# Patient Record
Sex: FEMALE | Race: WHITE | NOT HISPANIC OR LATINO | Employment: FULL TIME | URBAN - NONMETROPOLITAN AREA
[De-identification: names, ages, dates, MRNs, and addresses within clinical notes are randomized per-mention and may not be internally consistent; named-entity substitution may affect disease eponyms.]

---

## 2023-02-02 PROBLEM — D12.6 TUBULAR ADENOMA OF COLON: Status: ACTIVE | Noted: 2023-02-02

## 2023-02-02 PROBLEM — K63.5 COLON POLYPS: Status: ACTIVE | Noted: 2023-02-02

## 2023-02-02 PROBLEM — R09.82 POSTNASAL DRIP: Status: ACTIVE | Noted: 2023-02-02

## 2023-02-02 PROBLEM — G21.19: Status: ACTIVE | Noted: 2023-02-02

## 2023-02-02 PROBLEM — M85.80 OSTEOPENIA: Status: ACTIVE | Noted: 2023-02-02

## 2023-02-02 PROBLEM — D64.9 LOW HEMOGLOBIN: Status: ACTIVE | Noted: 2023-02-02

## 2023-02-02 PROBLEM — M79.676 PAIN AROUND TOENAIL: Status: ACTIVE | Noted: 2023-02-02

## 2023-02-02 PROBLEM — J02.9 SORE THROAT: Status: ACTIVE | Noted: 2023-02-02

## 2023-02-02 PROBLEM — M79.606 PAIN IN LOWER LIMB: Status: ACTIVE | Noted: 2023-02-02

## 2023-02-02 PROBLEM — M79.644 PAIN OF RIGHT THUMB: Status: ACTIVE | Noted: 2023-02-02

## 2023-02-02 PROBLEM — U07.1 COVID-19: Status: ACTIVE | Noted: 2023-02-02

## 2023-02-02 PROBLEM — I83.90 VARICOSE VEINS OF LOWER EXTREMITY: Status: ACTIVE | Noted: 2023-02-02

## 2023-02-02 PROBLEM — Z98.84 BARIATRIC SURGERY STATUS: Status: ACTIVE | Noted: 2023-02-02

## 2023-02-02 PROBLEM — F32.A DEPRESSION: Status: ACTIVE | Noted: 2023-02-02

## 2023-02-02 PROBLEM — S86.912A STRAIN OF LEFT KNEE: Status: ACTIVE | Noted: 2023-02-02

## 2023-02-02 PROBLEM — N39.0 UTI (URINARY TRACT INFECTION): Status: ACTIVE | Noted: 2023-02-02

## 2023-02-02 PROBLEM — R20.0 NUMBNESS AND TINGLING IN BOTH HANDS: Status: ACTIVE | Noted: 2023-02-02

## 2023-02-02 PROBLEM — J34.89 NASAL CRUSTING: Status: ACTIVE | Noted: 2023-02-02

## 2023-02-02 PROBLEM — R06.02 SOB (SHORTNESS OF BREATH): Status: ACTIVE | Noted: 2023-02-02

## 2023-02-02 PROBLEM — R26.9 GAIT DISTURBANCE: Status: ACTIVE | Noted: 2023-02-02

## 2023-02-02 PROBLEM — D50.9 IDA (IRON DEFICIENCY ANEMIA): Status: ACTIVE | Noted: 2023-02-02

## 2023-02-02 PROBLEM — G47.9 SLEEP DISTURBANCE: Status: ACTIVE | Noted: 2023-02-02

## 2023-02-02 PROBLEM — R35.0 INCREASED FREQUENCY OF URINATION: Status: ACTIVE | Noted: 2023-02-02

## 2023-02-02 PROBLEM — E66.9 CLASS 2 OBESITY WITH BODY MASS INDEX (BMI) OF 35.0 TO 35.9 IN ADULT: Status: ACTIVE | Noted: 2023-02-02

## 2023-02-02 PROBLEM — E78.00 HYPERCHOLESTEREMIA: Status: ACTIVE | Noted: 2023-02-02

## 2023-02-02 PROBLEM — E66.01 MORBID OBESITY (MULTI): Status: ACTIVE | Noted: 2023-02-02

## 2023-02-02 PROBLEM — M79.676 PAIN OF TOE: Status: ACTIVE | Noted: 2023-02-02

## 2023-02-02 PROBLEM — B35.1 ONYCHOMYCOSIS OF TOENAIL: Status: ACTIVE | Noted: 2023-02-02

## 2023-02-02 PROBLEM — R10.13 DYSPEPSIA: Status: ACTIVE | Noted: 2023-02-02

## 2023-02-02 PROBLEM — R05.9 COUGH: Status: ACTIVE | Noted: 2023-02-02

## 2023-02-02 PROBLEM — K25.0 ACUTE GASTRIC ULCER WITH HEMORRHAGE: Status: ACTIVE | Noted: 2023-02-02

## 2023-02-02 PROBLEM — S62.524A CLOSED NONDISPLACED FRACTURE OF DISTAL PHALANX OF RIGHT THUMB: Status: ACTIVE | Noted: 2023-02-02

## 2023-02-02 PROBLEM — J45.909 ASTHMATIC BRONCHITIS (HHS-HCC): Status: ACTIVE | Noted: 2023-02-02

## 2023-02-02 PROBLEM — R20.2 NUMBNESS AND TINGLING IN BOTH HANDS: Status: ACTIVE | Noted: 2023-02-02

## 2023-02-02 PROBLEM — R91.8 LUNG NODULES: Status: ACTIVE | Noted: 2023-02-02

## 2023-02-02 PROBLEM — G47.33 OSA (OBSTRUCTIVE SLEEP APNEA): Status: ACTIVE | Noted: 2023-02-02

## 2023-02-02 PROBLEM — L60.1 ONYCHOLYSIS OF TOENAIL: Status: ACTIVE | Noted: 2023-02-02

## 2023-02-02 PROBLEM — E55.9 VITAMIN D DEFICIENCY: Status: ACTIVE | Noted: 2023-02-02

## 2023-02-02 PROBLEM — S80.02XA: Status: ACTIVE | Noted: 2023-02-02

## 2023-02-02 PROBLEM — R09.89 EVIDENCE OF AIRWAYS HYPERREACTIVITY WITHOUT DIAGNOSIS OF ASTHMA: Status: ACTIVE | Noted: 2023-02-02

## 2023-02-02 PROBLEM — R11.0 NAUSEA: Status: ACTIVE | Noted: 2023-02-02

## 2023-02-02 PROBLEM — R60.0 EDEMA OF LOWER EXTREMITY: Status: ACTIVE | Noted: 2023-02-02

## 2023-02-02 PROBLEM — M54.50 LOWER BACK PAIN: Status: ACTIVE | Noted: 2023-02-02

## 2023-02-02 PROBLEM — R30.0 DYSURIA: Status: ACTIVE | Noted: 2023-02-02

## 2023-02-02 PROBLEM — M19.079 ARTHRITIS OF MIDFOOT: Status: ACTIVE | Noted: 2023-02-02
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
## 2023-02-02 PROBLEM — R51.9 HEADACHE: Status: ACTIVE | Noted: 2023-02-02

## 2023-02-02 PROBLEM — E03.9 HYPOTHYROIDISM: Status: ACTIVE | Noted: 2023-02-02

## 2023-02-02 PROBLEM — J30.9 ALLERGIC RHINITIS: Status: ACTIVE | Noted: 2023-02-02

## 2023-02-02 PROBLEM — E66.812 CLASS 2 OBESITY WITH BODY MASS INDEX (BMI) OF 35.0 TO 35.9 IN ADULT: Status: ACTIVE | Noted: 2023-02-02

## 2023-02-02 PROBLEM — S46.919A SHOULDER STRAIN: Status: ACTIVE | Noted: 2023-02-02

## 2023-02-02 PROBLEM — M79.18 MUSCULOSKELETAL PAIN: Status: ACTIVE | Noted: 2023-02-02

## 2023-02-02 PROBLEM — R06.83 SNORING: Status: ACTIVE | Noted: 2023-02-02

## 2023-02-02 PROBLEM — J34.2 DEVIATED NASAL SEPTUM: Status: ACTIVE | Noted: 2023-02-02

## 2023-02-02 PROBLEM — S46.811A STRAIN OF RIGHT SUBSCAPULARIS MUSCLE: Status: ACTIVE | Noted: 2023-02-02

## 2023-02-02 PROBLEM — K92.2 GASTRIC HEMORRHAGE: Status: ACTIVE | Noted: 2023-02-02

## 2023-02-02 PROBLEM — S90.229A SUBUNGUAL HEMATOMA OF TOE: Status: ACTIVE | Noted: 2023-02-02

## 2023-02-02 PROBLEM — R42 VERTIGO: Status: ACTIVE | Noted: 2023-02-02

## 2023-02-02 PROBLEM — R79.0 LOW FERRITIN LEVEL: Status: ACTIVE | Noted: 2023-02-02

## 2023-02-02 PROBLEM — Z86.59 HISTORY OF DEPRESSION: Status: ACTIVE | Noted: 2023-02-02

## 2023-02-02 PROBLEM — D64.9 ANEMIA OF UNKNOWN ETIOLOGY: Status: ACTIVE | Noted: 2023-02-02

## 2023-02-02 PROBLEM — R20.0 HAND NUMBNESS: Status: ACTIVE | Noted: 2023-02-02

## 2023-02-02 PROBLEM — R07.89 RIGHT-SIDED CHEST WALL PAIN: Status: ACTIVE | Noted: 2023-02-02

## 2023-02-02 RX ORDER — ALENDRONATE SODIUM 70 MG/1
1 TABLET ORAL
COMMUNITY
Start: 2014-09-04 | End: 2023-12-22 | Stop reason: ALTCHOICE

## 2023-02-02 RX ORDER — CETIRIZINE HYDROCHLORIDE 10 MG/1
1 TABLET ORAL DAILY
COMMUNITY
Start: 2020-05-11 | End: 2024-03-05 | Stop reason: SDUPTHER

## 2023-02-02 RX ORDER — MONTELUKAST SODIUM 10 MG/1
1 TABLET ORAL NIGHTLY
COMMUNITY
Start: 2020-05-11 | End: 2023-11-22 | Stop reason: SDUPTHER

## 2023-02-02 RX ORDER — LEVOTHYROXINE SODIUM 50 UG/1
1 TABLET ORAL DAILY
COMMUNITY
Start: 2014-02-14 | End: 2023-05-03

## 2023-02-02 RX ORDER — IBUPROFEN 100 MG/5ML
1 SUSPENSION, ORAL (FINAL DOSE FORM) ORAL DAILY
COMMUNITY
Start: 2020-11-12

## 2023-02-02 RX ORDER — TOPIRAMATE 50 MG/1
TABLET, FILM COATED ORAL
COMMUNITY
Start: 2021-05-03 | End: 2023-12-22 | Stop reason: ALTCHOICE

## 2023-02-02 RX ORDER — FLUTICASONE FUROATE AND VILANTEROL 200; 25 UG/1; UG/1
1 POWDER RESPIRATORY (INHALATION) DAILY
COMMUNITY
Start: 2020-04-07 | End: 2023-06-26 | Stop reason: SDUPTHER

## 2023-02-02 RX ORDER — ALBUTEROL SULFATE 90 UG/1
AEROSOL, METERED RESPIRATORY (INHALATION)
COMMUNITY
Start: 2020-04-07

## 2023-02-02 RX ORDER — FLUTICASONE PROPIONATE 50 MCG
2 SPRAY, SUSPENSION (ML) NASAL DAILY
COMMUNITY
Start: 2020-05-11 | End: 2024-03-05 | Stop reason: SDUPTHER

## 2023-03-04 LAB
FLU A RESULT: NOT DETECTED
FLU B RESULT: NOT DETECTED
SARS-COV-2 RESULT: DETECTED

## 2023-03-08 NOTE — PROGRESS NOTES
Subjective   Patient ID:   Justine Mesa is a 72 y.o. female who presents for Follow-up, Hypothyroidism, and Asthma.  HPI  Hiatal hernia/bariatric surgery:  Was cleared by me in Nov 2020 for surgery.  She is no longer pursuing this.     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  No longer an issue.     Iron deficiency anemia - secondary to GI bleed:  Seeing hematology.  Getting iron infusions and ferrous sulfate.  Hemoglobin was very low in June 2021.  Improved in Dec 2021 and Jan 2023.     Gastric ulcer:  Secondary to NSAIDs.  Taking Protonix and Carafate.  Had EGD and colonoscopy in Sep 2020.     Hypothyroidism:  Taking Synthroid.  Normal in July 2020.  DUE for re-check.     YOBANY:  Seeing pulmonology.     HLD:  Taking Simvastatin.  Last checked Sep 2020.  DUE for re-check.     Health maintenance:  Smoking: Never a smoker.  Mammogram (40-75): Jan 2022.  Labs: Jan 2023. DUE for TSH, lipid  Colonoscopy (50-75): Sep 2020  DEXA (65+, q 2 years): Nov 2019  Prevnar 13 (65+): 2017  Pneumovax 23 (65+, 1 year after Prev 13): Jan 2019  Influenza:  Zostavax (60+, 1 time, at pharmacy): DUE    Review of Systems  12 point review of systems negative unless stated above in HPI    Vitals:    03/16/23 0828   BP: 132/80   Pulse: 66   Resp: 18   SpO2: 94%       Physical Exam  General: Alert and oriented, well nourished, no acute distress.  Lungs: Clear to auscultation, non-labored respiration.  Heart: Normal rate, regular rhythm, no murmur, gallop or edema.  Neurologic: Awake, alert, and oriented X3, CN II-XII intact.  Psychiatric: Cooperative, appropriate mood and affect.    Assessment/Plan   I am glad you are well!  I have ordered some labs to be done as soon as you can.  We will call you with the results.  I have ordered you a mammogram to be done as soon as you are able.  We will call the results.  Continue specialty care.  Continue the same medications.  Chronic conditions are stable.  Call with questions or  concerns.    F/u  4 months  Diagnoses and all orders for this visit:  Drug-induced Parkinson's disease (CMS/HCC)  Low ferritin level  Iron deficiency anemia, unspecified iron deficiency anemia type  Hypothyroidism, unspecified type  -     TSH with reflex to Free T4 if abnormal; Future  Hypercholesteremia  -     Lipid Panel; Future  Breast screening  -     BI mammo bilateral screening tomosynthesis; Future

## 2023-03-16 ENCOUNTER — OFFICE VISIT (OUTPATIENT)
Dept: PRIMARY CARE | Facility: CLINIC | Age: 73
End: 2023-03-16
Payer: MEDICARE

## 2023-03-16 VITALS
OXYGEN SATURATION: 94 % | BODY MASS INDEX: 34.76 KG/M2 | SYSTOLIC BLOOD PRESSURE: 132 MMHG | WEIGHT: 203.6 LBS | HEART RATE: 66 BPM | DIASTOLIC BLOOD PRESSURE: 80 MMHG | RESPIRATION RATE: 18 BRPM | HEIGHT: 64 IN

## 2023-03-16 DIAGNOSIS — Z12.39 BREAST SCREENING: ICD-10-CM

## 2023-03-16 DIAGNOSIS — E03.9 HYPOTHYROIDISM, UNSPECIFIED TYPE: ICD-10-CM

## 2023-03-16 DIAGNOSIS — E78.00 HYPERCHOLESTEREMIA: ICD-10-CM

## 2023-03-16 DIAGNOSIS — R79.0 LOW FERRITIN LEVEL: ICD-10-CM

## 2023-03-16 DIAGNOSIS — G21.19 DRUG-INDUCED PARKINSON'S DISEASE (MULTI): Primary | ICD-10-CM

## 2023-03-16 DIAGNOSIS — D50.9 IRON DEFICIENCY ANEMIA, UNSPECIFIED IRON DEFICIENCY ANEMIA TYPE: ICD-10-CM

## 2023-03-16 PROBLEM — K57.32 DIVERTICULITIS OF COLON WITHOUT HEMORRHAGE: Status: ACTIVE | Noted: 2023-02-02

## 2023-03-16 PROCEDURE — 1036F TOBACCO NON-USER: CPT | Performed by: PHYSICIAN ASSISTANT

## 2023-03-16 PROCEDURE — 99213 OFFICE O/P EST LOW 20 MIN: CPT | Performed by: PHYSICIAN ASSISTANT

## 2023-03-16 PROCEDURE — 1160F RVW MEDS BY RX/DR IN RCRD: CPT | Performed by: PHYSICIAN ASSISTANT

## 2023-03-16 PROCEDURE — 1159F MED LIST DOCD IN RCRD: CPT | Performed by: PHYSICIAN ASSISTANT

## 2023-03-16 RX ORDER — BUTALB/ACETAMINOPHEN/CAFFEINE 50-325-40
1 TABLET ORAL DAILY
COMMUNITY

## 2023-03-16 RX ORDER — TITANIUM DIOXIDE, OCTINOXATE, ZINC OXIDE 4.61; 1.6; .78 G/40ML; G/40ML; G/40ML
500 CREAM TOPICAL
COMMUNITY

## 2023-03-16 RX ORDER — GUAIFENESIN 600 MG/1
1200 TABLET, EXTENDED RELEASE ORAL DAILY
COMMUNITY

## 2023-03-16 RX ORDER — OXYBUTYNIN CHLORIDE 10 MG/1
10 TABLET, EXTENDED RELEASE ORAL DAILY
COMMUNITY

## 2023-03-16 ASSESSMENT — PATIENT HEALTH QUESTIONNAIRE - PHQ9
2. FEELING DOWN, DEPRESSED OR HOPELESS: NOT AT ALL
SUM OF ALL RESPONSES TO PHQ9 QUESTIONS 1 AND 2: 0
1. LITTLE INTEREST OR PLEASURE IN DOING THINGS: NOT AT ALL

## 2023-03-16 ASSESSMENT — PAIN SCALES - GENERAL: PAINLEVEL: 0-NO PAIN

## 2023-05-03 DIAGNOSIS — E03.9 HYPOTHYROIDISM, UNSPECIFIED TYPE: ICD-10-CM

## 2023-05-03 RX ORDER — LEVOTHYROXINE SODIUM 50 UG/1
TABLET ORAL
Qty: 90 TABLET | Refills: 0 | Status: SHIPPED | OUTPATIENT
Start: 2023-05-03 | End: 2023-08-14 | Stop reason: SDUPTHER

## 2023-05-04 ENCOUNTER — LAB (OUTPATIENT)
Dept: LAB | Facility: LAB | Age: 73
End: 2023-05-04
Payer: MEDICARE

## 2023-05-04 DIAGNOSIS — E03.9 HYPOTHYROIDISM, UNSPECIFIED TYPE: ICD-10-CM

## 2023-05-04 DIAGNOSIS — E78.00 HYPERCHOLESTEREMIA: ICD-10-CM

## 2023-05-04 DIAGNOSIS — D50.9 IRON DEFICIENCY ANEMIA, UNSPECIFIED IRON DEFICIENCY ANEMIA TYPE: ICD-10-CM

## 2023-05-04 LAB
BASOPHILS (10*3/UL) IN BLOOD BY AUTOMATED COUNT: 0.45 X10E9/L (ref 0–0.1)
BASOPHILS/100 LEUKOCYTES IN BLOOD BY AUTOMATED COUNT: 4.9 % (ref 0–2)
CHOLESTEROL (MG/DL) IN SER/PLAS: 232 MG/DL (ref 0–199)
CHOLESTEROL IN HDL (MG/DL) IN SER/PLAS: 53.2 MG/DL
CHOLESTEROL/HDL RATIO: 4.4
EOSINOPHILS (10*3/UL) IN BLOOD BY AUTOMATED COUNT: 0.21 X10E9/L (ref 0–0.4)
EOSINOPHILS/100 LEUKOCYTES IN BLOOD BY AUTOMATED COUNT: 2.3 % (ref 0–6)
ERYTHROCYTE DISTRIBUTION WIDTH (RATIO) BY AUTOMATED COUNT: 14.8 % (ref 11.5–14.5)
ERYTHROCYTE MEAN CORPUSCULAR HEMOGLOBIN CONCENTRATION (G/DL) BY AUTOMATED: 31.6 G/DL (ref 32–36)
ERYTHROCYTE MEAN CORPUSCULAR VOLUME (FL) BY AUTOMATED COUNT: 97 FL (ref 80–100)
ERYTHROCYTES (10*6/UL) IN BLOOD BY AUTOMATED COUNT: 4.75 X10E12/L (ref 4–5.2)
FERRITIN (UG/LL) IN SER/PLAS: 333 UG/L (ref 8–150)
HEMATOCRIT (%) IN BLOOD BY AUTOMATED COUNT: 46.2 % (ref 36–46)
HEMOGLOBIN (G/DL) IN BLOOD: 14.6 G/DL (ref 12–16)
IMMATURE GRANULOCYTES/100 LEUKOCYTES IN BLOOD BY AUTOMATED COUNT: 7.9 % (ref 0–0.9)
IRON (UG/DL) IN SER/PLAS: 85 UG/DL (ref 35–150)
IRON BINDING CAPACITY (UG/DL) IN SER/PLAS: 286 UG/DL (ref 240–445)
IRON SATURATION (%) IN SER/PLAS: 30 % (ref 25–45)
LDL: 145 MG/DL (ref 0–99)
LEUKOCYTES (10*3/UL) IN BLOOD BY AUTOMATED COUNT: 9.1 X10E9/L (ref 4.4–11.3)
LYMPHOCYTES (10*3/UL) IN BLOOD BY AUTOMATED COUNT: 1.66 X10E9/L (ref 0.8–3)
LYMPHOCYTES/100 LEUKOCYTES IN BLOOD BY AUTOMATED COUNT: 18.2 % (ref 13–44)
MONOCYTES (10*3/UL) IN BLOOD BY AUTOMATED COUNT: 0.95 X10E9/L (ref 0.05–0.8)
MONOCYTES/100 LEUKOCYTES IN BLOOD BY AUTOMATED COUNT: 10.4 % (ref 2–10)
NEUTROPHILS (10*3/UL) IN BLOOD BY AUTOMATED COUNT: 5.13 X10E9/L (ref 1.6–5.5)
NEUTROPHILS/100 LEUKOCYTES IN BLOOD BY AUTOMATED COUNT: 56.3 % (ref 40–80)
OVALOCYTES PRESENCE IN BLOOD BY LIGHT MICROSCOPY: NORMAL
PLATELETS (10*3/UL) IN BLOOD AUTOMATED COUNT: 209 X10E9/L (ref 150–450)
PLATELETS GIANT PRESENCE IN BLOOD BY LIGHT MICROSCOPY: NORMAL
RBC MORPHOLOGY IN BLOOD: NORMAL
REACTIVE LYMPH: NORMAL
SCHISTOCYTES (PRESENCE) IN BLOOD BY LIGHT MICROSCOPY: NORMAL
THYROTROPIN (MIU/L) IN SER/PLAS BY DETECTION LIMIT <= 0.05 MIU/L: 1.22 MIU/L (ref 0.44–3.98)
TRIGLYCERIDE (MG/DL) IN SER/PLAS: 169 MG/DL (ref 0–149)
VLDL: 34 MG/DL (ref 0–40)

## 2023-05-04 PROCEDURE — 84443 ASSAY THYROID STIM HORMONE: CPT

## 2023-05-04 PROCEDURE — 85025 COMPLETE CBC W/AUTO DIFF WBC: CPT

## 2023-05-04 PROCEDURE — 83540 ASSAY OF IRON: CPT

## 2023-05-04 PROCEDURE — 83550 IRON BINDING TEST: CPT

## 2023-05-04 PROCEDURE — 36415 COLL VENOUS BLD VENIPUNCTURE: CPT

## 2023-05-04 PROCEDURE — 80061 LIPID PANEL: CPT

## 2023-05-04 PROCEDURE — 82728 ASSAY OF FERRITIN: CPT

## 2023-05-05 NOTE — RESULT ENCOUNTER NOTE
Labs look good overall - ferritin is high - she does see hematology. Cholesterol is much higher than last time - diet and exercise.

## 2023-05-25 LAB
APPEARANCE, URINE: NORMAL
BILIRUBIN, URINE: NEGATIVE
BLOOD, URINE: NEGATIVE
COLOR, URINE: YELLOW
GLUCOSE, URINE: NEGATIVE MG/DL
KETONES, URINE: NEGATIVE MG/DL
LEUKOCYTE ESTERASE, URINE: NEGATIVE
NITRITE, URINE: NEGATIVE
PH, URINE: 8 (ref 5–8)
PROTEIN, URINE: NEGATIVE MG/DL
SPECIFIC GRAVITY, URINE: 1.01 (ref 1–1.03)
UROBILINOGEN, URINE: <2 MG/DL (ref 0–1.9)

## 2023-05-26 LAB — URINE CULTURE: NORMAL

## 2023-06-26 DIAGNOSIS — J45.909 ASTHMATIC BRONCHITIS WITHOUT COMPLICATION, UNSPECIFIED ASTHMA SEVERITY, UNSPECIFIED WHETHER PERSISTENT (HHS-HCC): ICD-10-CM

## 2023-06-27 RX ORDER — FLUTICASONE FUROATE AND VILANTEROL 200; 25 UG/1; UG/1
1 POWDER RESPIRATORY (INHALATION) DAILY
Qty: 1 EACH | Refills: 0 | Status: SHIPPED | OUTPATIENT
Start: 2023-06-27 | End: 2023-07-24

## 2023-07-17 ENCOUNTER — APPOINTMENT (OUTPATIENT)
Dept: PRIMARY CARE | Facility: CLINIC | Age: 73
End: 2023-07-17
Payer: MEDICARE

## 2023-07-18 ENCOUNTER — APPOINTMENT (OUTPATIENT)
Dept: PRIMARY CARE | Facility: CLINIC | Age: 73
End: 2023-07-18
Payer: MEDICARE

## 2023-08-14 DIAGNOSIS — E03.9 HYPOTHYROIDISM, UNSPECIFIED TYPE: ICD-10-CM

## 2023-08-14 RX ORDER — LEVOTHYROXINE SODIUM 50 UG/1
50 TABLET ORAL DAILY
Qty: 90 TABLET | Refills: 0 | Status: SHIPPED | OUTPATIENT
Start: 2023-08-14 | End: 2024-02-19

## 2023-10-03 DIAGNOSIS — R05.1 ACUTE COUGH: ICD-10-CM

## 2023-10-03 RX ORDER — AZITHROMYCIN 500 MG/1
500 TABLET, FILM COATED ORAL DAILY
Qty: 5 TABLET | Refills: 0 | Status: SHIPPED | OUTPATIENT
Start: 2023-10-03 | End: 2023-10-08

## 2023-10-03 RX ORDER — DEXAMETHASONE 4 MG/1
4 TABLET ORAL
Qty: 5 TABLET | Refills: 0 | Status: SHIPPED | OUTPATIENT
Start: 2023-10-03 | End: 2023-12-22 | Stop reason: ALTCHOICE

## 2023-10-03 NOTE — TELEPHONE ENCOUNTER
Pt called c/o dry cough, wheezing, sore throat,headache x 1 week   Discussed with Dr. Lopez   Start dexamethasone 4mg x 5 days   Zithromax 500mg x 5 days  Mucinex otc as needed  Let us know if no improvement

## 2023-10-04 ENCOUNTER — HOSPITAL ENCOUNTER (OUTPATIENT)
Dept: RADIOLOGY | Facility: HOSPITAL | Age: 73
Discharge: HOME | End: 2023-10-04
Payer: MEDICARE

## 2023-10-04 DIAGNOSIS — J84.9 INTERSTITIAL PULMONARY DISEASE, UNSPECIFIED (MULTI): ICD-10-CM

## 2023-10-04 PROCEDURE — 71250 CT THORAX DX C-: CPT

## 2023-10-04 PROCEDURE — 71250 CT THORAX DX C-: CPT | Performed by: STUDENT IN AN ORGANIZED HEALTH CARE EDUCATION/TRAINING PROGRAM

## 2023-11-01 ENCOUNTER — OFFICE VISIT (OUTPATIENT)
Dept: PODIATRY | Facility: CLINIC | Age: 73
End: 2023-11-01
Payer: MEDICARE

## 2023-11-01 DIAGNOSIS — R60.0 EDEMA OF LOWER EXTREMITY: ICD-10-CM

## 2023-11-01 DIAGNOSIS — M79.671 PAIN IN BOTH FEET: ICD-10-CM

## 2023-11-01 DIAGNOSIS — B35.1 ONYCHOMYCOSIS OF TOENAIL: ICD-10-CM

## 2023-11-01 DIAGNOSIS — M79.676 PAIN AROUND TOENAIL: Primary | ICD-10-CM

## 2023-11-01 DIAGNOSIS — M79.672 PAIN IN BOTH FEET: ICD-10-CM

## 2023-11-01 PROCEDURE — 99212 OFFICE O/P EST SF 10 MIN: CPT | Performed by: PODIATRIST

## 2023-11-01 PROCEDURE — 1159F MED LIST DOCD IN RCRD: CPT | Performed by: PODIATRIST

## 2023-11-01 PROCEDURE — 1160F RVW MEDS BY RX/DR IN RCRD: CPT | Performed by: PODIATRIST

## 2023-11-01 PROCEDURE — 1126F AMNT PAIN NOTED NONE PRSNT: CPT | Performed by: PODIATRIST

## 2023-11-01 PROCEDURE — 1036F TOBACCO NON-USER: CPT | Performed by: PODIATRIST

## 2023-11-09 NOTE — PROGRESS NOTES
This is a 73 y.o. female est patient for foot pain    History of Present Illness:   Patient states they are here for help trimming nails  Denies trauma  Unable to safely trim on own  Has swelling in legs  No other pedal complaints      Past Medical History  Past Medical History:   Diagnosis Date    Acute bronchitis, unspecified     Acute bronchitis, unspecified organism    Acute frontal sinusitis, unspecified 01/06/2020    Acute frontal sinusitis    Acute frontal sinusitis, unspecified 03/07/2018    Acute frontal sinusitis    Acute maxillary sinusitis, unspecified 01/06/2020    Acute maxillary sinusitis    Acute maxillary sinusitis, unspecified 11/09/2017    Acute maxillary antritis    Acute tonsillitis due to other specified organisms     Acute tonsillitis due to other specified organisms    Acute tonsillitis due to other specified organisms 01/31/2017    Acute tonsillitis due to other specified organisms    Acute upper respiratory infection, unspecified 04/18/2017    Viral URI with cough    Acute upper respiratory infection, unspecified 06/07/2017    Acute URI    Chronic frontal sinusitis 03/07/2018    Frontal sinusitis    Diaphragmatic hernia without obstruction or gangrene 06/13/2022    Paraesophageal hiatal hernia    Hypothyroidism, unspecified 01/04/2022    Hypothyroidism    Pain in unspecified lower leg 07/16/2019    Calf pain    Personal history of other diseases of the digestive system 07/11/2022    History of gastroesophageal reflux (GERD)    Personal history of other diseases of the digestive system     History of gastroesophageal reflux (GERD)    Personal history of other diseases of the digestive system     History of hemorrhoids    Personal history of other diseases of the respiratory system 03/07/2018    History of sore throat    Personal history of other diseases of the respiratory system 11/09/2017    History of acute pharyngitis    Personal history of other diseases of the respiratory system  11/21/2017    History of acute bronchitis    Personal history of other diseases of the respiratory system 03/07/2018    History of acute pharyngitis    Personal history of other specified conditions     History of dizziness    Personal history of urinary calculi     Personal history of renal calculi       Medications and Allergies have been reviewed.    Review Of Systems:  GENERAL: No weight loss, malaise or fevers.  HEENT: Negative for frequent or significant headaches,   RESPIRATORY: Negative for cough, wheezing or shortness of breath.  CARDIOVASCULAR: Negative for chest pain, leg swelling or palpitations.    Physical Exam:  Patient is a pleasant, cooperative, well developed 73 y.o.  adult female. The patient is alert and oriented to time, place and person.   Patient has normal affect and mood.    Examination of Both Lower Extremities:     Vascular exam: Dorsalis pedis and Posterior Tibial pulses palpable 2/4 bilateral. Capillary refill time less than 3 seconds b/l. No Hair growth noted to digits. Mild +1 non pitting edema noted. Skin temp warm to warm from proximal to distal b/l. No varicosities noted.     Neurologic exam: Vibratory, protective and proprioception intact b/l. No neurologic deficits noted.      Musculoskeletal exam: Muscle strength 5/5 for all major muscle groups tested in the lower extremity b/l. Contracted digits and bunion deformity noted b/l. Mid foot spurring noted b/l and decreased longitudinal arch. Pain with palpation noted to left dorsal midfoot. No edema, erythema or ecchymosis noted.      Dermatologic exam: Nails 1-5 b/l discolored and elongated. No SOI noted Webspaces 1-4 b/l are clean, dry and intact. No primary or secondary skin lesions noted. No open lesions or wounds noted at this time .  1. Pain around toenail        2. Edema of lower extremity        3. Onychomycosis of toenail        4. Pain in both feet          Patient exam and eval  Nails debrided  Monitor swelling  Recommend  compression socks  No other pedal concerns  Fu prn  Patient was in agreement to this plan. All questions answered.      Dennise Hays DPM  [phone]  [facility]  Fax: [phone]

## 2023-11-17 ENCOUNTER — LAB (OUTPATIENT)
Dept: LAB | Facility: LAB | Age: 73
End: 2023-11-17
Payer: MEDICARE

## 2023-11-17 ENCOUNTER — OFFICE VISIT (OUTPATIENT)
Dept: PULMONOLOGY | Facility: HOSPITAL | Age: 73
End: 2023-11-17
Payer: MEDICARE

## 2023-11-17 VITALS
SYSTOLIC BLOOD PRESSURE: 142 MMHG | BODY MASS INDEX: 36.39 KG/M2 | DIASTOLIC BLOOD PRESSURE: 85 MMHG | OXYGEN SATURATION: 93 % | WEIGHT: 212 LBS | HEART RATE: 64 BPM | TEMPERATURE: 97.9 F

## 2023-11-17 DIAGNOSIS — R91.1 LUNG NODULE: ICD-10-CM

## 2023-11-17 DIAGNOSIS — G47.33 OSA (OBSTRUCTIVE SLEEP APNEA): ICD-10-CM

## 2023-11-17 DIAGNOSIS — J84.9 ILD (INTERSTITIAL LUNG DISEASE) (MULTI): ICD-10-CM

## 2023-11-17 DIAGNOSIS — J84.9 ILD (INTERSTITIAL LUNG DISEASE) (MULTI): Primary | ICD-10-CM

## 2023-11-17 LAB
CCP IGG SERPL-ACNC: <1 U/ML
CENTROMERE B AB SER-ACNC: <0.2 AI
CHROMATIN AB SERPL-ACNC: <0.2 AI
CK SERPL-CCNC: 60 U/L (ref 0–215)
CRP SERPL-MCNC: 0.17 MG/DL
DSDNA AB SER-ACNC: <1 IU/ML
ENA JO1 AB SER QL IA: <0.2 AI
ENA RNP AB SER IA-ACNC: 0.2 AI
ENA SCL70 AB SER QL IA: <0.2 AI
ENA SM AB SER IA-ACNC: <0.2 AI
ENA SM+RNP AB SER QL IA: <0.2 AI
ENA SS-A AB SER IA-ACNC: <0.2 AI
ENA SS-B AB SER IA-ACNC: <0.2 AI
ERYTHROCYTE [SEDIMENTATION RATE] IN BLOOD BY WESTERGREN METHOD: 10 MM/H (ref 0–30)
RHEUMATOID FACT SER NEPH-ACNC: 10 IU/ML (ref 0–15)
RIBOSOMAL P AB SER-ACNC: <0.2 AI

## 2023-11-17 PROCEDURE — 86331 IMMUNODIFFUSION OUCHTERLONY: CPT

## 2023-11-17 PROCEDURE — 85652 RBC SED RATE AUTOMATED: CPT

## 2023-11-17 PROCEDURE — 86235 NUCLEAR ANTIGEN ANTIBODY: CPT

## 2023-11-17 PROCEDURE — 86036 ANCA SCREEN EACH ANTIBODY: CPT

## 2023-11-17 PROCEDURE — 1159F MED LIST DOCD IN RCRD: CPT | Performed by: INTERNAL MEDICINE

## 2023-11-17 PROCEDURE — 99215 OFFICE O/P EST HI 40 MIN: CPT | Performed by: INTERNAL MEDICINE

## 2023-11-17 PROCEDURE — 1036F TOBACCO NON-USER: CPT | Performed by: INTERNAL MEDICINE

## 2023-11-17 PROCEDURE — 1160F RVW MEDS BY RX/DR IN RCRD: CPT | Performed by: INTERNAL MEDICINE

## 2023-11-17 PROCEDURE — 84182 PROTEIN WESTERN BLOT TEST: CPT

## 2023-11-17 PROCEDURE — 36415 COLL VENOUS BLD VENIPUNCTURE: CPT

## 2023-11-17 PROCEDURE — 86431 RHEUMATOID FACTOR QUANT: CPT

## 2023-11-17 PROCEDURE — 86200 CCP ANTIBODY: CPT

## 2023-11-17 PROCEDURE — 86038 ANTINUCLEAR ANTIBODIES: CPT

## 2023-11-17 PROCEDURE — 83516 IMMUNOASSAY NONANTIBODY: CPT

## 2023-11-17 PROCEDURE — 82085 ASSAY OF ALDOLASE: CPT

## 2023-11-17 PROCEDURE — 86606 ASPERGILLUS ANTIBODY: CPT

## 2023-11-17 PROCEDURE — 86225 DNA ANTIBODY NATIVE: CPT

## 2023-11-17 PROCEDURE — 82550 ASSAY OF CK (CPK): CPT

## 2023-11-17 PROCEDURE — 1126F AMNT PAIN NOTED NONE PRSNT: CPT | Performed by: INTERNAL MEDICINE

## 2023-11-17 PROCEDURE — 86140 C-REACTIVE PROTEIN: CPT

## 2023-11-17 SDOH — ECONOMIC STABILITY: FOOD INSECURITY: WITHIN THE PAST 12 MONTHS, THE FOOD YOU BOUGHT JUST DIDN'T LAST AND YOU DIDN'T HAVE MONEY TO GET MORE.: NEVER TRUE

## 2023-11-17 SDOH — ECONOMIC STABILITY: FOOD INSECURITY: WITHIN THE PAST 12 MONTHS, YOU WORRIED THAT YOUR FOOD WOULD RUN OUT BEFORE YOU GOT MONEY TO BUY MORE.: NEVER TRUE

## 2023-11-17 ASSESSMENT — LIFESTYLE VARIABLES
HOW OFTEN DO YOU HAVE SIX OR MORE DRINKS ON ONE OCCASION: NEVER
AUDIT-C TOTAL SCORE: 1
SKIP TO QUESTIONS 9-10: 1
HOW OFTEN DO YOU HAVE SIX OR MORE DRINKS ON ONE OCCASION: NEVER
HOW OFTEN DO YOU HAVE A DRINK CONTAINING ALCOHOL: MONTHLY OR LESS
HOW OFTEN DO YOU HAVE A DRINK CONTAINING ALCOHOL: MONTHLY OR LESS
HOW MANY STANDARD DRINKS CONTAINING ALCOHOL DO YOU HAVE ON A TYPICAL DAY: 1 OR 2

## 2023-11-17 ASSESSMENT — PATIENT HEALTH QUESTIONNAIRE - PHQ9
SUM OF ALL RESPONSES TO PHQ9 QUESTIONS 1 AND 2: 0
1. LITTLE INTEREST OR PLEASURE IN DOING THINGS: NOT AT ALL
2. FEELING DOWN, DEPRESSED OR HOPELESS: NOT AT ALL

## 2023-11-17 ASSESSMENT — PAIN SCALES - GENERAL: PAINLEVEL: 0-NO PAIN

## 2023-11-17 NOTE — PATIENT INSTRUCTIONS
Ms Moshe , it was a pleasure seeing you in clinic today. We discussed the following:     -You will continue to use your inhalers.  -You will stop by the blood work  -You will repeat a breathing and walking test next time you are here  -I will referral for [facility]    Will see you back in clinic in 1-2 months in [place] office.

## 2023-11-17 NOTE — PROGRESS NOTES
History of Present IllnessMrs. Moshe is a 66 y.o woman, with history of GERD and hiatal hernia, non smoker, being evaluated for chronic cough     12/6/2017: She relates her cough started about 3 months ago, with no sputum, but sometimes feels subjective fever and mild chills. She can not indicate any precipitating or alleviating factors. She has no dyspnea on exertion (except strenous exercise) or at rest. She currently works inside the house. She denies orthopnea, pnd, clarissa.     06/19/2017: Since her last visit, she was started on flonase for allergic rhinitis and omperazole for GERD with good sx improvement. She still coughs on occasion but she thinks it is manageable. About 2 weeks ago, she had an episode of bronchitis that her PCP treated with a course of abx (zpack) and prednisone. Sx now resolved. She had her CT scan on 6/17/2017. Results are still pending.     11/21/2017: Since the last visit, she has been feeling well with her cough under control until 2 weeks ago when her  and her starting having URI sx. She has seen her PCP her prescirbed a course of augmentin with no relief. Denies f/c/ns. Had a CXR before her clinic visit with no pna.     07/11/2018: Since the last visit, her breathing has been well. No cough, sputum, f/c/ns. CAT 11. She had abdominal pain and was found to have diverticulitis. Her PCP prescribed cipro and flagyl and she feels better. She had repeat CT and javed/dlco (results below).      04/10/2019: Since the last visit, patient's breathing is mostly unchanged. no new cough or sputum. CAT 9. Had sleep study followed by a titration study (results below).      06/24/2019: Since the last visit, patient's breathing is mostly unchanged. Continues to cough on occasions with postnasal drip. Received her PAP machine but did not start using till a few weeks ago. Had a repeat CT scan done (results below).      07/17/2019: Since the last visit, patient's breathing is mostly unchanged.  "Started to use her CPAP machine and has been compliant with it. Feels better when she wears \"sometimes\".      04/07/2020: She stopped using her CPAP machine. Since the last visit, patient's breathing has been worsening. Over the last few weeks she is more short of breath on exertion. No f/c/ns. She attributes the change to her asthma nad weather change     07/07/2020: Since the last visit, patient's breathing has been improving. No new chest pain, cough, sputum, fever, chills or night sweats. Compliance data not available today.     11/10/2020: Since the last visit, patient's breathing is mostly unchanged. In June she wore her CPAP 14/20 with more than 4 hrs all the nights, however her machine broke and 2/2 covid limitations has not been able to get it fixed. Scheduled for surgery     05/25/2021: She feels that her breathing is slightly worse since her last follow-up. She has been consistently using her Breo Ellipta. She has not needed her rescue inhaler, or use of any frequently. No night symptoms. Her surgery for hiatal hernia repair has been on hold pending her weight loss. She has not been using her CPAP machine.     07/25/2023: Got her surgery April 2022, recovered well. No new chest pain, cough, sputum, fever, chills or night sweats. Since the last visit, patient's breathing has been improving. Patient had repeat breathing test, 6MWT (results below). [facility] with [facility]. A CT was done in the ED (results below).      11/17/2023 Since the last visit, patient's breathing has been worsening. Patient active in everyday life goes on walks, climbs stairs, does not participates in regimented exercise. No hospital admissions or ED visits. No new chest pain, cough, sputum, fever, chills or night sweats.        Sleep history:  02/27/2019 -> mild YOBANY with AHI of 6.8 but increases to REM AHI of 22.4 and adolfo SpO2 of 84%  03/25/209 -> titration study auto-CPAP [id number] with EPR and s/s " respironics Dream Wear     Previous pulmonary history:   She had no previous lung hx, never on supplemental oxygen, or inhalers.     Hospitalization History:  She has no recent hospitalizion.     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 CT scan results 7mm nodule stable in size.  12/6/2017: CT scan, minimal linear atelectasis. 7 mm nodule in the RLL along the major fissue.  CXR in 11/2016 -> mostly clear     PFTs:  07/11/2023 -> Ratio of 0.77/FEV1 1.98L (88%)(no BD response)/FVC 2.58L (87%)/TLC 80%/RVtoTLC ratio 0.42/DLCO 85%  06/21/2018 -> Ratio of 0.71/FEV1 82%(no BD response)/FVC 88%/ %/RVtoTLC ratio 0.71/DLCO 84%  12/06/2016 -> 73/65/263/81/57      6 MWTs:   07/11/2023 ->on RA, 280m. Peak SpO2 of 97%. Yves SpO2 97%.      Echo:none on record     Labs:  no anemia, eosinophilia    Vitals:    11/17/23 1435   BP: 142/85   Pulse: 64   Temp: 36.6 °C (97.9 °F)   SpO2: 93%      PHYSICAL EXAMINATION  Constitutional: Alert and oriented. In no acute distress. Well developed, well nourished.  Head and Face: Normal.   Oropharynx: normal.  Neck: No neck mass observed.  Pulmonary: Chest is normal to inspection. No increased work of breathing or signs of respiratory distress.   CV:  No obvious peripheral edema.  MSK: normal gait and station. No clubbing or cyanosis of the fingernails.  Skin: Normal skin color and pigmentation, normal skin turgor, and no rash.  Neurologic:  EOMI.  Moving all 4 extremities.  Psychiatric: Intact judgement and insight.     Medication Documentation Review Audit       Reviewed by Tana Escobedo MA (Medical Assistant) on 23 at 1445      Medication Order Taking? Sig Documenting Provider Last Dose Status   albuterol 90 mcg/actuation inhaler [id number] Yes Inhale. INHALE 1 TO 2 PUFFS EVERY 4 TO 6 HOURS AS NEEDED. Historical Provider, MD Taking Active   alendronate (Fosamax) 70 mg tablet [id number] Yes Take 1 tablet (70 mg) by mouth 1 (one) time per week. Historical Provider, MD Taking Active   ascorbic acid (Vitamin C) 1,000 mg tablet [id number] Yes Take 1 tablet (1,000 mg) by mouth once daily. Historical Provider, MD Taking Active   Breo Ellipta 200-25 mcg/dose inhaler [id number] Yes inhale 1 puff by mouth once a day Odell Sharma MD Taking Active   calcium citrate-vitamin D3 (Citracal+D) 315 mg-5 mcg (200 unit) tablet [id number] Yes Take 1 tablet by mouth once daily. Historical Provider, MD Taking Active   cetirizine (ZyrTEC) 10 mg tablet [id number] Yes Take 1 tablet (10 mg) by mouth once daily. Historical Provider, MD Taking Active   cranberry 400 mg capsule [id number] Yes Take 500 mg by mouth. Historical Provider, MD Taking Active   dexAMETHasone (Decadron) 4 mg tablet [id number] No Take 1 tablet (4 mg) by mouth once daily with a meal for 5 days.   Patient not taking: Reported on 2023    Odell Sharma MD Not Taking  10/08/23 6451   fluticasone (Flonase) 50 mcg/actuation nasal spray [id number] Yes Administer 2 sprays into each nostril once daily. Historical Provider, MD Taking Active   guaiFENesin (Mucinex) 600 mg 12 hr tablet [id number] Yes Take 2 tablets (1,200 mg) by mouth once daily. Historical MD Douglas Taking Active   levothyroxine (Synthroid, Levoxyl) 50 mcg tablet [id number] Yes Take 1 tablet (50 mcg) by mouth once daily. Odell Sharma MD Taking Active   montelukast (Singulair) 10 mg tablet [id number] Yes Take 1 tablet (10 mg) by mouth once daily at  bedtime. Historical Provider, MD Taking Active   oxybutynin XL (Ditropan-XL) 10 mg 24 hr tablet [id number] Yes Take 1 tablet (10 mg) by mouth once daily. Historical Provider, MD Taking Active   topiramate (Topamax) 50 mg tablet [id number] No 1 TAB every AM, & 1 TAB mid-afternoon 1-2 hours before evening meal. Historical Provider, MD Not Taking Active                   Provider Impressions      is a 66 y.o woman, with history of GERD and hiatal hernia, non smoker, no previous lung hx, never on supplemental oxygen, or inhalers. follows up in clinic for chronic cough and YOBANY.     # Pulmonary fibrosis:  -No known occupational exposure or exposure to suspect medication.   -No risk factors for HP.   -Workup for rheumatologic disease including RF, anti-ccp, KLAUS with reflex KAY, ANCA, ESR, CRP, CPK, aldolase, myositis ab sent today.  -CT pattern with probable UIP  -Will defer VATS biopsy  -Referral to CT on follow up.  -Discussed antifibrotic therapy with OFEV/Esbriet.  -Evaluation for comorbidities (YOBANY, GERD, aspiration, CAD, lung cancer screening etc.) as below  -Vaccinations        # Chronic cough:  -hyperactive airway worsened by postnasal drip and GERD  -On breoEllipta and prn albuterol     # Post nasal drip:  -restarted flonase     # GERD with hiatal hernia:  -On omprazole 40mg daily. stopped     # Lung nodule:  -7mm, stable at 18 months. Stable on repeat CT in June 2019.     # YOBANY:  02/27/2019 -> mild YOBANY with AHI of 6.8 but increases to REM AHI of 22.4 and adolfo SpO2 of 84%  03/25/209 -> titration study auto-CPAP [id number] with EPR and s/s respironics Dream Wear  -stopped using her PAP in 10/2019. restarted but machine broke.  -Patient cautioned concerning the use of sedatives or alcohol, which may exacerbate sleep-disordered breathing.  -Patient cautioned concerning operating any motorized equipment or motor vehicle until daytime sx resolved.  -Counseled on the body weight reduction.  -Avoiding supine sleep may be  beneficial   -Does not wish to continue to wear CPAP.     RTC in 6 months

## 2023-11-20 LAB
ALDOLASE SERPL-CCNC: 8.2 U/L (ref 1.2–7.6)
ANA PATTERN: ABNORMAL
ANA SER QL HEP2 SUBST: POSITIVE
ANA TITR SER IF: ABNORMAL {TITER}

## 2023-11-21 LAB
ANCA AB PATTERN SER IF-IMP: NORMAL
ANCA IGG TITR SER IF: NORMAL {TITER}
MYELOPEROXIDASE AB SER-ACNC: 0 AU/ML (ref 0–19)
PROTEINASE3 AB SER-ACNC: 1 AU/ML (ref 0–19)

## 2023-11-22 DIAGNOSIS — J30.9 ALLERGIC RHINITIS, UNSPECIFIED SEASONALITY, UNSPECIFIED TRIGGER: Primary | ICD-10-CM

## 2023-11-22 RX ORDER — MONTELUKAST SODIUM 10 MG/1
10 TABLET ORAL NIGHTLY
Qty: 90 TABLET | Refills: 1 | Status: SHIPPED | OUTPATIENT
Start: 2023-11-22 | End: 2024-03-05 | Stop reason: SDUPTHER

## 2023-11-24 LAB
A FUMIGATUS1 AB SER QL ID: ABNORMAL
A FUMIGATUS6 AB SER QL ID: ABNORMAL
A PULLULANS AB SER QL ID: ABNORMAL
PIGEON SERUM AB QL ID: ABNORMAL
S RECTIVIRGULA AB SER QL ID: ABNORMAL

## 2023-11-27 ENCOUNTER — CLINICAL SUPPORT (OUTPATIENT)
Dept: CARDIAC REHAB | Facility: HOSPITAL | Age: 73
End: 2023-11-27
Payer: MEDICARE

## 2023-11-27 DIAGNOSIS — J84.9 ILD (INTERSTITIAL LUNG DISEASE) (MULTI): ICD-10-CM

## 2023-11-30 ENCOUNTER — HOSPITAL ENCOUNTER (OUTPATIENT)
Dept: RESPIRATORY THERAPY | Facility: HOSPITAL | Age: 73
Discharge: HOME | End: 2023-11-30
Payer: MEDICARE

## 2023-11-30 DIAGNOSIS — J84.9 ILD (INTERSTITIAL LUNG DISEASE) (MULTI): ICD-10-CM

## 2023-11-30 LAB
MGC ASCENT PFT - FEV1 - POST: 2.08
MGC ASCENT PFT - FEV1 - PRE: 2.15
MGC ASCENT PFT - FEV1 - PREDICTED: 2.02
MGC ASCENT PFT - FVC - POST: 2.82
MGC ASCENT PFT - FVC - PRE: 2.91
MGC ASCENT PFT - FVC - PREDICTED: 2.6

## 2023-11-30 PROCEDURE — 94729 DIFFUSING CAPACITY: CPT | Performed by: STUDENT IN AN ORGANIZED HEALTH CARE EDUCATION/TRAINING PROGRAM

## 2023-11-30 PROCEDURE — 94726 PLETHYSMOGRAPHY LUNG VOLUMES: CPT | Performed by: STUDENT IN AN ORGANIZED HEALTH CARE EDUCATION/TRAINING PROGRAM

## 2023-11-30 PROCEDURE — 94060 EVALUATION OF WHEEZING: CPT

## 2023-11-30 PROCEDURE — 94060 EVALUATION OF WHEEZING: CPT | Performed by: STUDENT IN AN ORGANIZED HEALTH CARE EDUCATION/TRAINING PROGRAM

## 2023-12-05 LAB
ANA SER QL: NEGATIVE
ANNOTATION COMMENT IMP: NORMAL
EJ AB SER QL: NEGATIVE
ENA JO1 IGG SER-ACNC: 1 AU/ML (ref 0–40)
ENA SS-A 60KD AB SER-ACNC: 1 AU/ML (ref 0–40)
ENA SS-A IGG SER QL: 1 AU/ML (ref 0–40)
FIBRILLARIN AB SER QL: NEGATIVE
KU AB SER QL: NEGATIVE
MDA5 AB SER QL LINE BLOT: NEGATIVE
MI2 AB SER QL: NEGATIVE
MJ AB SER QL LINE BLOT: NEGATIVE
OJ AB SER QL: NEGATIVE
PL12 AB SER QL: NEGATIVE
PL7 AB SER QL: NEGATIVE
PM/SCL-100 AB SER QL LINE BLOT: NEGATIVE
SAE1 AB SER QL LINE BLOT: NEGATIVE
SRP AB SERPL QL: NEGATIVE
TIF1-GAMMA AB SER QL LINE BLOT: NEGATIVE
U1 SNRNP IGG SER-ACNC: 2 UNITS (ref 0–19)

## 2023-12-06 ENCOUNTER — TELEPHONE (OUTPATIENT)
Dept: PULMONOLOGY | Facility: HOSPITAL | Age: 73
End: 2023-12-06

## 2023-12-11 ENCOUNTER — CLINICAL SUPPORT (OUTPATIENT)
Dept: CARDIAC REHAB | Facility: HOSPITAL | Age: 73
End: 2023-12-11
Payer: MEDICARE

## 2023-12-11 DIAGNOSIS — J84.9 ILD (INTERSTITIAL LUNG DISEASE) (MULTI): Primary | ICD-10-CM

## 2023-12-11 PROCEDURE — 94626 PHY/QHP OP PULM RHB W/MNTR: CPT | Performed by: PEDIATRICS

## 2023-12-11 NOTE — PROGRESS NOTES
Pulmonary Daily Rehabilitation Assessment    Name: Justine Mesa  Medical Record Number: [id number]  YOB: 1950    Today’s Date: 12/11/2023  Primary Care Physician: Odell Sharma MD    Session #: 2    Oxygen Assessment    SP02 rest: 100%  SP02 exertion: 98 %    Oxygen Use  Requires supplemental O2:    Liters at Rest: ra   Liters with Exertion: ra  Using O2 as prescribed:       Weight Management    Height: 5'3  Weight: 211lbs  BMI:        Daily Activity Log   Modality METS Load Duration   1 Pre-Exercise   6/0      2 Treadmill   11/1 1.9 1.2 10:00   3 Nustep 4000   11/1 2.7-2.8 2.0 10:00   4 Recumbent Cycle sportart [id number] 1.0 15:00   5 Weights       6 UBE   6/0 W0-5 Rpm 40-42 8:00   7 Rower      8 Aero Bike      9 Post-Exercise   6/0            Other Core Components/Risk Factor Assessment:    Blood Pressure Management:  Hx of Hypertension: No   Resting BP: 152/82  POST BP: 138/84      General Comments:    Education : COPD/ILD &  handouts given    Rehab Staff Signature: Juhi Andres, RRT

## 2023-12-13 ENCOUNTER — CLINICAL SUPPORT (OUTPATIENT)
Dept: CARDIAC REHAB | Facility: HOSPITAL | Age: 73
End: 2023-12-13
Payer: MEDICARE

## 2023-12-13 DIAGNOSIS — J84.9 ILD (INTERSTITIAL LUNG DISEASE) (MULTI): Primary | ICD-10-CM

## 2023-12-13 PROCEDURE — 94626 PHY/QHP OP PULM RHB W/MNTR: CPT | Performed by: PEDIATRICS

## 2023-12-13 NOTE — PROGRESS NOTES
Pulmonary Daily Rehabilitation Assessment    Name: Justine Mesa  Medical Record Number: [id number]  YOB: 1950    Today’s Date: 12/13/2023  Primary Care Physician: Odell Sharma MD    Session #: 3    Oxygen Assessment    SP02 rest: 99 %  SP02 exertion: 97 %    Oxygen Use  Requires supplemental O2:    Liters at Rest: ra   Liters with Exertion: ra  Using O2 as prescribed:       Weight Management    Height: 5'3  Weight: 211  BMI:        Daily Activity Log   Modality METS Load Duration   1 Pre-Exercise 6/0      2 Treadmill       3 Nustep 4000   7/1 2.5 2,0 15:00   4 Recumbent Cycle sport art  11/2 Rpm 11-16 2.5 12:00   5 Weights       6 UBE      7 Rower      8 Aero Bike      9 Post-Exercise  6/0  Resistance band chart with red band 3 sets 10 each Red band 35:00         Other Core Components/Risk Factor Assessment:    Blood Pressure Management:  Hx of Hypertension: No   Resting BP: 132/81  POST BP: 119/76      General Comments:    Red resistance band given with 1-3 exercise sheets and band resistance sheet    Rehab Staff Signature: Juhi Andres, RRT

## 2023-12-13 NOTE — PROGRESS NOTES
Pulmonary Rehabilitation Assessment  Pulmonary Rehabilitation Assessment    Name: Justine Mesa  Medical Record Number: [id number]  YOB: 1950    Today’s Date: 11/27/2023  Primary Care Physician: Odell Sharma MD  Referring Physician: Alicia Gregory    Program Location: [facility] [place]  Primary Diagnosis: ILD  Date of Diagnosis: ?    Session #: Initial assessment    Oxygen Assessment    SP02 rest: 98 %  SP02 exertion: 94 %    Oxygen Use  Requires supplemental O2:    Liters at Rest: ra   Liters with Exertion: ra  Using O2 as prescribed:     Oxygen (Supplemental 02 use only)  Demonstrates appropriate knowledge of 02 use?   Demonstrates knowledge of 02 safety?   Home 02 system:  Portable 02:    Hypoxemia managed?:   Home Pulse Oximeter?:     MMRC Survey score:  Intake: 3  Discharge:     Goal Status:   Oxygen Goals:  Oxygen Interventions:      Psychosocial Assessment    Pt reported/currently experiencing: No  Currently seeing a mental health provider: No  Social Support: yes/  PHQ-9 Survey Score:   Intake: Becks 5  Discharge:     CAT Survey Score:  Intake:  Discharge:    Learning assessment/barriers: None  Preferred learning method: Auditory     Educational Assessment:  Pulmonary Knowledge Test:   Intake: 13/15  Discharge: /15    Stages of Change:Maintenance    Goal Status:   Psychosocial Goals: maintin  Psychosocial Interventions/Education: ask about status every 30 days/monitor weekly    Nutrition Assessment:    Current Dietary Guidelines:  Barriers to dietary change: No     Diet Habit Survey: drop down selection of Picture Your Plate,   Plate:    Intake:   Discharge:     Diabetes Assessment    Diabetes:No   Medication:   Last Hemoglobin A1C:    Last Hemoglobin A1C date:   Pt monitors BS at home:   Frequency:   FBS range:   Hypoglycemic Episodes: No     Weight Management    Height: 5'3  Weight: 212.5  BMI: 37.6   Goal Status:   Nutrition Goals:   Nutrition Interventions/Education:      Exercise Assessment  Current Home Exercise: No   Mode: none  Days/Week: 0  Min/Day: 0    6-minute Walk Assessment:  Intake: 6-MWT distance (meters):370,3     FiO2:ra     SPO2:  Discharge: 6-MWT distance (meters):      FiO2:     SPO2:    Exercise Prescription:    Based on: {6-min walk test   Frequency:  4 days/week   Mode: Treadmill   Duration: 10total aerobic minutes   Intensity: RPE 12       Target        Max METS        SpO2 Range 89 to  100%       O2 Flow Rate ra to raL/min     Modality METS Load Duration   1 Pre-Exercise      2 Treadmill  2.0 1.4 10:00   3 Nustep 4000  2.0 1.0 10:00   4 Recumbent Cycle  2.0 1.0 10:00   5 Weights  arms 3lbs curls butterfly press  2lb legs front & side lifts      6 Post-Exercise        Resistance Training: Yes   Home Exercise Prescription given: No     Goal Status:   Exercise Goals: Decrease sob, increase knowledge of ILD, Increase stamina & endurance  Exercise Interventions/Education:     Other Core Components/Risk Factor Assessment:    Medication adherence:  Current Medications:    Current Outpatient Medications   Medication Sig Dispense Refill    albuterol 90 mcg/actuation inhaler Inhale. INHALE 1 TO 2 PUFFS EVERY 4 TO 6 HOURS AS NEEDED.      alendronate (Fosamax) 70 mg tablet Take 1 tablet (70 mg) by mouth 1 (one) time per week.      ascorbic acid (Vitamin C) 1,000 mg tablet Take 1 tablet (1,000 mg) by mouth once daily.      Breo Ellipta 200-25 mcg/dose inhaler inhale 1 puff by mouth once a day 1 each 2    calcium citrate-vitamin D3 (Citracal+D) 315 mg-5 mcg (200 unit) tablet Take 1 tablet by mouth once daily.      cetirizine (ZyrTEC) 10 mg tablet Take 1 tablet (10 mg) by mouth once daily.      cranberry 400 mg capsule Take 500 mg by mouth.      dexAMETHasone (Decadron) 4 mg tablet Take 1 tablet (4 mg) by mouth once daily with a meal for 5 days. (Patient not taking: Reported on 11/17/2023) 5 tablet 0    fluticasone (Flonase) 50 mcg/actuation nasal spray Administer 2  sprays into each nostril once daily.      guaiFENesin (Mucinex) 600 mg 12 hr tablet Take 2 tablets (1,200 mg) by mouth once daily.      levothyroxine (Synthroid, Levoxyl) 50 mcg tablet Take 1 tablet (50 mcg) by mouth once daily. 90 tablet 0    montelukast (Singulair) 10 mg tablet Take 1 tablet (10 mg) by mouth once daily at bedtime. 90 tablet 1    oxybutynin XL (Ditropan-XL) 10 mg 24 hr tablet Take 1 tablet (10 mg) by mouth once daily.      topiramate (Topamax) 50 mg tablet 1 TAB every AM, & 1 TAB mid-afternoon 1-2 hours before evening meal.       No current facility-administered medications for this visit.     Allergies:    Allergies   Allergen Reactions    Sulfa (Sulfonamide Antibiotics) Unknown       Taking medications as prescribed: Yes    If no, why:    Uses pill box/organizer:    Carries medication list: No     Blood Pressure Management:  Hx of Hypertension: No   Resting BP: 118/70    Smoking/Tobacco Assessment;    Social History     Tobacco Use   Smoking Status Never   Smokeless Tobacco Never       Goal Status:   Other Core Component Goals:   Other Core Component Interventions/Education:      # of hospital admissions due to lung problems: 0  # of ER visits due to lung problems: 1    Individual Patient Goals:  Increase knowledge of ILD, increase stamina & endurance, decrease sob  General Comments:        Rehab Staff Signature: Juhi Andres RRT      Name: Justine Mesa  Medical Record Number: [id number]  YOB: 1950    Today’s Date: 12/13/2023  Primary Care Physician: Odell Sharma MD  Referring Physician: Alicia Eduardo Location: [facility]  Primary Diagnosis:   Date of Diagnosis:     Session #:     Oxygen Assessment    SP02 rest:  %  SP02 exertion:  %    Oxygen Use  Requires supplemental O2:    Liters at Rest:    Liters with Exertion:   Using O2 as prescribed:      Oxygen (Supplemental 02 use only)  Demonstrates appropriate knowledge of 02 use?   Demonstrates knowledge  of 02 safety?   Home 02 system:  Portable 02:    Hypoxemia managed?:    Home Pulse Oximeter?:      MMRC Survey score:  Intake:   Discharge:     Goal Status:   Oxygen Goals:  Oxygen Interventions:      Psychosocial Assessment    Pt reported/currently experiencing:   Currently seeing a mental health provider:   Social Support:   PHQ-9 Survey Score:   Intake:   Discharge:     CAT Survey Score:  Intake:26  Discharge:    Learning assessment/barriers:   Preferred learning method:      Educational Assessment:  Pulmonary Knowledge Test:   Intake: /15  Discharge: /15    Stages of Change:    Goal Status:   Psychosocial Goals:   Psychosocial Interventions/Education:     Nutrition Assessment:    Current Dietary Guidelines:  Barriers to dietary change:     Diet Habit Survey: drop down selection of Picture Your Plate,    Plate:    Intake:   Discharge:     Diabetes Assessment    Diabetes:   Medication:   Last Hemoglobin A1C:     Last Hemoglobin A1C date:   Pt monitors BS at home:   Frequency:   FBS range:   Hypoglycemic Episodes:     Weight Management    Height:   Weight:   BMI:    Goal Status:   Nutrition Goals:   Nutrition Interventions/Education:     Exercise Assessment  Current Home Exercise:   Mode:   Days/Week:   Min/Day:     6-minute Walk Assessment:  Intake: 6-MWT distance (meters):     FiO2:     SPO2:  Discharge: 6-MWT distance (meters):      FiO2:     SPO2:    Exercise Prescription:    Based on: {   Frequency:   days/week   Mode:    Duration:  total aerobic minutes   Intensity: RPE        Target HR        Max METS        SpO2 Range  to  %       O2 Flow Rate  to  L/min     Modality METS Load Duration   1 Pre-Exercise      2 Treadmill       3 Nustep 4000       4 Recumbent Cycle       5 Weights       6 Post-Exercise        Resistance Training:   Home Exercise Prescription given:     Goal Status:   Exercise Goals:   Exercise Interventions/Education:     Other Core Components/Risk Factor Assessment:    Medication  adherence:  Current Medications:    Current Outpatient Medications   Medication Sig Dispense Refill    albuterol 90 mcg/actuation inhaler Inhale. INHALE 1 TO 2 PUFFS EVERY 4 TO 6 HOURS AS NEEDED.      alendronate (Fosamax) 70 mg tablet Take 1 tablet (70 mg) by mouth 1 (one) time per week.      ascorbic acid (Vitamin C) 1,000 mg tablet Take 1 tablet (1,000 mg) by mouth once daily.      Breo Ellipta 200-25 mcg/dose inhaler inhale 1 puff by mouth once a day 1 each 2    calcium citrate-vitamin D3 (Citracal+D) 315 mg-5 mcg (200 unit) tablet Take 1 tablet by mouth once daily.      cetirizine (ZyrTEC) 10 mg tablet Take 1 tablet (10 mg) by mouth once daily.      cranberry 400 mg capsule Take 500 mg by mouth.      dexAMETHasone (Decadron) 4 mg tablet Take 1 tablet (4 mg) by mouth once daily with a meal for 5 days. (Patient not taking: Reported on 11/17/2023) 5 tablet 0    fluticasone (Flonase) 50 mcg/actuation nasal spray Administer 2 sprays into each nostril once daily.      guaiFENesin (Mucinex) 600 mg 12 hr tablet Take 2 tablets (1,200 mg) by mouth once daily.      levothyroxine (Synthroid, Levoxyl) 50 mcg tablet Take 1 tablet (50 mcg) by mouth once daily. 90 tablet 0    montelukast (Singulair) 10 mg tablet Take 1 tablet (10 mg) by mouth once daily at bedtime. 90 tablet 1    oxybutynin XL (Ditropan-XL) 10 mg 24 hr tablet Take 1 tablet (10 mg) by mouth once daily.      topiramate (Topamax) 50 mg tablet 1 TAB every AM, & 1 TAB mid-afternoon 1-2 hours before evening meal.       No current facility-administered medications for this visit.     Allergies:    Allergies   Allergen Reactions    Sulfa (Sulfonamide Antibiotics) Unknown       Taking medications as prescribed:    If no, why:    Uses pill box/organizer:    Carries medication list:     Blood Pressure Management:  Hx of Hypertension:   Resting BP:     Smoking/Tobacco Assessment;    Social History     Tobacco Use   Smoking Status Never   Smokeless Tobacco Never       Goal  Status:   Other Core Component Goals:   Other Core Component Interventions/Education:      # of hospital admissions due to lung problems:   # of ER visits due to lung problems:     Individual Patient Goals:    General Comments:        Rehab Staff Signature: Juhi Andres RRT    Vascular Rehabilitation Assessment    Name: Justine Mesa  Medical Record Number: [id number]  YOB: 1950    Today’s Date: 12/13/2023  Primary Care Physician: Odell Sharma MD  Referring Physician:     Program Location:     General  Primary Diagnosis:   Date of Diagnosis:     Session #:     Psychosocial Assessment    Pre PHQ-9:   Post PHQ-9:     Quality of Life Survey: PAQ   PAQ pre:     PAQ post:      Pt reported/currently experiencing:   Currently seeing a mental health provider:   Social Support:   Learning Assessment:  Barriers:   Preferred learning method:     Vascular Knowledge Test  Pre: /15  Post: /15    Stages of Change:  Goal Status:   Psychosocial Goals:   Psychosocial Interventions/Education:     Nutrition Assessment:    Hyperlipidemia:     Lipids:   Lipid Draw Date:     Current Dietary Guidelines:  Barriers to dietary change:     Diet Habit Survey: drop down selection of Picture Your Plate,    Pre:   Post:     Diabetes Assessment    Diabetes:   Medication:   Last Hemoglobin A1C:   Last Hemoglobin A1C date:   Pt monitors BS at home:   Frequency:   FBS range:   Hypoglycemic Episodes:     Weight Management    Height:   Weight:   BMI:   Waist Circumference:   Goal Status:   Nutrition Goals:   Nutrition Interventions/Education:     Exercise Assessment  Current Home Exercise:   Mode:   Days/Week:   Min/Day:     Exercise Prescription:    Based on: {   Frequency:   days/week   Mode:    Duration:  total aerobic minutes   Intensity: RPE        Target HR        Max METS        SpO2 Range  to  %       O2 Flow Rate  to  L/min     Modality METS Load Duration   1 Pre-Exercise      2 Treadmill       3 Nustep 4000       4  Recumbent Cycle       5 Weights       6 Post-Exercise        Resistance Training:   Home Exercise Prescription given:     Goal Status:   Exercise Goals:   Exercise Interventions/Education:     Other Core Components/Risk Factor Assessment:    Medication adherence:  Current Medications:   Current Outpatient Medications   Medication Sig Dispense Refill    albuterol 90 mcg/actuation inhaler Inhale. INHALE 1 TO 2 PUFFS EVERY 4 TO 6 HOURS AS NEEDED.      alendronate (Fosamax) 70 mg tablet Take 1 tablet (70 mg) by mouth 1 (one) time per week.      ascorbic acid (Vitamin C) 1,000 mg tablet Take 1 tablet (1,000 mg) by mouth once daily.      Breo Ellipta 200-25 mcg/dose inhaler inhale 1 puff by mouth once a day 1 each 2    calcium citrate-vitamin D3 (Citracal+D) 315 mg-5 mcg (200 unit) tablet Take 1 tablet by mouth once daily.      cetirizine (ZyrTEC) 10 mg tablet Take 1 tablet (10 mg) by mouth once daily.      cranberry 400 mg capsule Take 500 mg by mouth.      dexAMETHasone (Decadron) 4 mg tablet Take 1 tablet (4 mg) by mouth once daily with a meal for 5 days. (Patient not taking: Reported on 11/17/2023) 5 tablet 0    fluticasone (Flonase) 50 mcg/actuation nasal spray Administer 2 sprays into each nostril once daily.      guaiFENesin (Mucinex) 600 mg 12 hr tablet Take 2 tablets (1,200 mg) by mouth once daily.      levothyroxine (Synthroid, Levoxyl) 50 mcg tablet Take 1 tablet (50 mcg) by mouth once daily. 90 tablet 0    montelukast (Singulair) 10 mg tablet Take 1 tablet (10 mg) by mouth once daily at bedtime. 90 tablet 1    oxybutynin XL (Ditropan-XL) 10 mg 24 hr tablet Take 1 tablet (10 mg) by mouth once daily.      topiramate (Topamax) 50 mg tablet 1 TAB every AM, & 1 TAB mid-afternoon 1-2 hours before evening meal.       No current facility-administered medications for this visit.     Allergies:   Allergies   Allergen Reactions    Sulfa (Sulfonamide Antibiotics) Unknown     Taking medications as prescribed:   If no, why:    Uses pill box/organizer:   Carries medication list:     Blood Pressure Management:  Hx of Hypertension:   Resting BP: Growth %ile SmartLinks can only be used for patients less than 20 years old.     Heart Failure Management:  Hx of Heart Failure:   Type (selection):   Most recent EF: @LVEF%@    Onset of heart failure diagnosis:   Last heart failure hospitalization:   Number of HF admissions per year:   Symptoms:  Is there a family Hx of HF:   Does patient obtain daily weight   KCCQ survey: Pre:   Post:     Smoking/Tobacco Assessment;    Social History     Tobacco Use   Smoking Status Never   Smokeless Tobacco Never       Goal Status:   Other Core Component Goals:   Other Core Component Interventions/Education:      Goal Status:   Individual Patient Goals:        General Comments:        Rehab Staff Signature: Juih Andres RRT      Cardiac Rehabilitation Initial Assessment (iITP)    Name: Justine Mesa  Medical Record Number: [id number]  YOB: 1950    Today’s Date: 12/13/2023  Primary Care Physician: Odell Sharma MD  Referring Physician:     General  1. ILD (interstitial lung disease) (CMS/[facility])  Referral to Pulmonary Rehabilitation          AACVPR Risk Stratification:    Medical History  Past Medical History:   Diagnosis Date    Acute bronchitis, unspecified     Acute bronchitis, unspecified organism    Acute frontal sinusitis, unspecified 01/06/2020    Acute frontal sinusitis    Acute frontal sinusitis, unspecified 03/07/2018    Acute frontal sinusitis    Acute maxillary sinusitis, unspecified 01/06/2020    Acute maxillary sinusitis    Acute maxillary sinusitis, unspecified 11/09/2017    Acute maxillary antritis    Acute tonsillitis due to other specified organisms     Acute tonsillitis due to other specified organisms    Acute tonsillitis due to other specified organisms 01/31/2017    Acute tonsillitis due to other specified organisms    Acute upper respiratory infection, unspecified  04/18/2017    Viral URI with cough    Acute upper respiratory infection, unspecified 06/07/2017    Acute URI    Chronic frontal sinusitis 03/07/2018    Frontal sinusitis    Diaphragmatic hernia without obstruction or gangrene 06/13/2022    Paraesophageal hiatal hernia    Hypothyroidism, unspecified 01/04/2022    Hypothyroidism    Pain in unspecified lower leg 07/16/2019    Calf pain    Personal history of other diseases of the digestive system 07/11/2022    History of gastroesophageal reflux (GERD)    Personal history of other diseases of the digestive system     History of gastroesophageal reflux (GERD)    Personal history of other diseases of the digestive system     History of hemorrhoids    Personal history of other diseases of the respiratory system 03/07/2018    History of sore throat    Personal history of other diseases of the respiratory system 11/09/2017    History of acute pharyngitis    Personal history of other diseases of the respiratory system 11/21/2017    History of acute bronchitis    Personal history of other diseases of the respiratory system 03/07/2018    History of acute pharyngitis    Personal history of other specified conditions     History of dizziness    Personal history of urinary calculi     Personal history of renal calculi     Past Surgical History:   Procedure Laterality Date    GALLBLADDER SURGERY  10/11/2020    Gallbladder Surgery    HYSTERECTOMY  09/07/2020    Hysterectomy    OTHER SURGICAL HISTORY  05/04/2021    Colonoscopy    OTHER SURGICAL HISTORY  10/11/2020    Esophagogastroduodenoscopy     Allergies:   Allergies   Allergen Reactions    Sulfa (Sulfonamide Antibiotics) Unknown       Psychosocial Assessment  Patient is returning for a follow up visit after reporting minimal to mild depressive symptoms.     Please document a new PHQ-9 score.    Pt reported/currently experiencing:   Currently seeing a mental health provider:   Social Support:     Learning assessment/barriers:    Preferred learning method:    Quality of Life Survey:    Educational Assessment:  Cardiac Knowledge Test: /15    Stages of Change:    Psychosocial Goals:   Psychosocial Interventions/Education:         Nutrition Assessment:    Hyperlipidemia:      Lipids:   Lipid Draw Date:     Current Dietary Guidelines:  Barrier     Diet Habit Survey score:     Diabetes Assessment    Diabetes:   Medication:   Last Hemoglobin A1C:    Last Hemoglobin A1C date:   Pt monitors BS at home:   Frequency:   FBS range:   Hypoglycemic Episodes:     Weight Management:    There were no vitals filed for this visit.  There is no height or weight on file to calculate BMI.    Nutrition Goals:   Nutrition Interventions/Education:     Exercise Assessment:  Current Home Exercise:   Mode:   Days/Week:   Min/Day:     Exercise Prescription:    Based on: {   Frequency:   days/week   Mode:    Duration:  total aerobic minutes   Intensity: RPE        Target HR        METS        SpO2 Range  %       O2 Flow Rate  L/min     Modality METS Load Duration   1 Pre-Exercise      2 Treadmill       3 Nustep 4000       4 Recumbent Cycle       5 Weights       6 Post-Exercise        Exercise Goals:   Exercise Interventions/Education:     Other Core Components/Risk Factor Assessment:    Medication adherence:   Current Medications:   Current Outpatient Medications   Medication Sig Dispense Refill    albuterol 90 mcg/actuation inhaler Inhale. INHALE 1 TO 2 PUFFS EVERY 4 TO 6 HOURS AS NEEDED.      alendronate (Fosamax) 70 mg tablet Take 1 tablet (70 mg) by mouth 1 (one) time per week.      ascorbic acid (Vitamin C) 1,000 mg tablet Take 1 tablet (1,000 mg) by mouth once daily.      Breo Ellipta 200-25 mcg/dose inhaler inhale 1 puff by mouth once a day 1 each 2    calcium citrate-vitamin D3 (Citracal+D) 315 mg-5 mcg (200 unit) tablet Take 1 tablet by mouth once daily.      cetirizine (ZyrTEC) 10 mg tablet Take 1 tablet (10 mg) by mouth once daily.      cranberry 400 mg  capsule Take 500 mg by mouth.      dexAMETHasone (Decadron) 4 mg tablet Take 1 tablet (4 mg) by mouth once daily with a meal for 5 days. (Patient not taking: Reported on 11/17/2023) 5 tablet 0    fluticasone (Flonase) 50 mcg/actuation nasal spray Administer 2 sprays into each nostril once daily.      guaiFENesin (Mucinex) 600 mg 12 hr tablet Take 2 tablets (1,200 mg) by mouth once daily.      levothyroxine (Synthroid, Levoxyl) 50 mcg tablet Take 1 tablet (50 mcg) by mouth once daily. 90 tablet 0    montelukast (Singulair) 10 mg tablet Take 1 tablet (10 mg) by mouth once daily at bedtime. 90 tablet 1    oxybutynin XL (Ditropan-XL) 10 mg 24 hr tablet Take 1 tablet (10 mg) by mouth once daily.      topiramate (Topamax) 50 mg tablet 1 TAB every AM, & 1 TAB mid-afternoon 1-2 hours before evening meal.       No current facility-administered medications for this visit.                 Taking medications as prescribed:    If no, why:    Uses pill box/organizer:    Carries medication list:     Blood Pressure Management:  Hx of Hypertension:   Resting BP: Growth %ile SmartLinks can only be used for patients less than 20 years old.     Heart Failure Management:  Hx of Heart Failure:  Type (selection):   Most recent EF: @LVEF%@  Date:  Onset of heart failure diagnosis:   Last heart failure hospitalization:   Number of HF admissions per year:   Symptoms:  Is there a family Hx of HF:  Does patient obtain daily weight     Smoking/Tobacco Assessment:    Tobacco Use: Low Risk  (11/17/2023)    Patient History     Smoking Tobacco Use: Never     Smokeless Tobacco Use: Never     Passive Exposure: Not on file       Other Core Component Goals:   Other Core Component Interventions/Education:        Individual Patient Goals:        [facility] Staff Signature: Juhi Andres, RRT  Electronic MD Review Signature:

## 2023-12-18 ENCOUNTER — CLINICAL SUPPORT (OUTPATIENT)
Dept: CARDIAC REHAB | Facility: HOSPITAL | Age: 73
End: 2023-12-18
Payer: MEDICARE

## 2023-12-18 DIAGNOSIS — J84.9 ILD (INTERSTITIAL LUNG DISEASE) (MULTI): Primary | ICD-10-CM

## 2023-12-18 PROCEDURE — 94626 PHY/QHP OP PULM RHB W/MNTR: CPT | Performed by: PEDIATRICS

## 2023-12-18 NOTE — PROGRESS NOTES
Pulmonary Daily Rehabilitation Assessment    Name: Justine Mesa  Medical Record Number: [id number]  YOB: 1950    Today’s Date: 12/18/2023  Primary Care Physician: Odell Sharma MD    Session #: 5    Oxygen Assessment    SP02 rest: 99 %  SP02 exertion: 98 %    Oxygen Use  Requires supplemental O2:    Liters at Rest: ra   Liters with Exertion: ra  Using O2 as prescribed:       Weight Management    Height: 5'3  Weight: 211lbs.  BMI:        Daily Activity Log   Modality METS Load Duration   1 Pre-Exercise   6/0      2 Treadmill       3 Nustep 4000    13/3 3.1-3.2 3.0 15:00   4 Recumbent Cycle sci-fit  11/2 16-19w 2.5 10:04   5 Weights       6 UBE   13/3 W0-5 39-56rpm 10:00   7 Rower      8 Aero Bike      9 Post-Exercise   6/0            Other Core Components/Risk Factor Assessment:    Blood Pressure Management:  Hx of Hypertension: No   Resting BP: 126/74  POST BP: 139/81      General Comments:    [id number] given, strertch, didyou know, exercise    Rehab Staff Signature: Juhi Andres, RRT

## 2023-12-20 ENCOUNTER — CLINICAL SUPPORT (OUTPATIENT)
Dept: CARDIAC REHAB | Facility: HOSPITAL | Age: 73
End: 2023-12-20
Payer: MEDICARE

## 2023-12-20 DIAGNOSIS — J84.9 ILD (INTERSTITIAL LUNG DISEASE) (MULTI): Primary | ICD-10-CM

## 2023-12-20 PROCEDURE — 94626 PHY/QHP OP PULM RHB W/MNTR: CPT | Performed by: PEDIATRICS

## 2023-12-20 NOTE — PROGRESS NOTES
Pulmonary Daily Rehabilitation Assessment    Name: Justine Mesa  Medical Record Number: [id number]  YOB: 1950    Today’s Date: 12/20/2023  Primary Care Physician: Odell Sharma MD    Session #: 6    Oxygen Assessment    SP02 rest: 100 %  SP02 exertion: 97 %    Oxygen Use  Requires supplemental O2:    Liters at Rest: ra   Liters with Exertion: ra  Using O2 as prescribed:       Weight Management    Height: 5'3  Weight: 216  BMI:        Daily Activity Log   Modality METS Load Duration   1 Pre-Exercise     6/0      2 Treadmill   12/2 2.3 1.7 12:00   3 Nustep 4000    11/2 2.8-3.3 3.0 16:00   4 Recumbent Cycle 10/2 W9-17 2.5 12:00   5 Weights       6 UBE      7 Rower      8 Aero Bike      9 Post-Exercise  6/0            Other Core Components/Risk Factor Assessment:    Blood Pressure Management:  Hx of Hypertension: No   Resting BP: 118/79  POST BP: 145/83      General Comments:        Rehab Staff Signature: Juhi Andres, RRT

## 2023-12-22 ENCOUNTER — OFFICE VISIT (OUTPATIENT)
Dept: PRIMARY CARE | Facility: CLINIC | Age: 73
End: 2023-12-22
Payer: MEDICARE

## 2023-12-22 VITALS
BODY MASS INDEX: 35.85 KG/M2 | WEIGHT: 210 LBS | HEART RATE: 63 BPM | HEIGHT: 64 IN | SYSTOLIC BLOOD PRESSURE: 129 MMHG | DIASTOLIC BLOOD PRESSURE: 87 MMHG | RESPIRATION RATE: 18 BRPM | OXYGEN SATURATION: 94 %

## 2023-12-22 DIAGNOSIS — Z00.00 ENCOUNTER FOR ANNUAL WELLNESS VISIT (AWV) IN MEDICARE PATIENT: ICD-10-CM

## 2023-12-22 DIAGNOSIS — M25.562 CHRONIC PAIN OF BOTH KNEES: ICD-10-CM

## 2023-12-22 DIAGNOSIS — J84.9 INTERSTITIAL LUNG DISEASE (MULTI): ICD-10-CM

## 2023-12-22 DIAGNOSIS — G89.29 CHRONIC PAIN OF BOTH KNEES: ICD-10-CM

## 2023-12-22 DIAGNOSIS — M25.561 CHRONIC PAIN OF BOTH KNEES: ICD-10-CM

## 2023-12-22 DIAGNOSIS — E03.9 HYPOTHYROIDISM, UNSPECIFIED TYPE: ICD-10-CM

## 2023-12-22 PROCEDURE — 1160F RVW MEDS BY RX/DR IN RCRD: CPT | Performed by: INTERNAL MEDICINE

## 2023-12-22 PROCEDURE — 99213 OFFICE O/P EST LOW 20 MIN: CPT | Performed by: INTERNAL MEDICINE

## 2023-12-22 PROCEDURE — 1159F MED LIST DOCD IN RCRD: CPT | Performed by: INTERNAL MEDICINE

## 2023-12-22 PROCEDURE — 1126F AMNT PAIN NOTED NONE PRSNT: CPT | Performed by: INTERNAL MEDICINE

## 2023-12-22 PROCEDURE — 1036F TOBACCO NON-USER: CPT | Performed by: INTERNAL MEDICINE

## 2023-12-22 PROCEDURE — G0439 PPPS, SUBSEQ VISIT: HCPCS | Performed by: INTERNAL MEDICINE

## 2023-12-22 PROCEDURE — 1170F FXNL STATUS ASSESSED: CPT | Performed by: INTERNAL MEDICINE

## 2023-12-22 RX ORDER — DICLOFENAC SODIUM 10 MG/G
4 GEL TOPICAL 4 TIMES DAILY PRN
Qty: 100 G | Refills: 1 | Status: SHIPPED | OUTPATIENT
Start: 2023-12-22 | End: 2024-03-06

## 2023-12-22 RX ORDER — UBIDECARENONE 75 MG
CAPSULE ORAL EVERY 24 HOURS
COMMUNITY
Start: 2014-09-04

## 2023-12-22 ASSESSMENT — PAIN SCALES - GENERAL: PAINLEVEL: 0-NO PAIN

## 2023-12-22 ASSESSMENT — ACTIVITIES OF DAILY LIVING (ADL)
TAKING_MEDICATION: INDEPENDENT
MANAGING_FINANCES: INDEPENDENT
DRESSING: INDEPENDENT
DOING_HOUSEWORK: INDEPENDENT
BATHING: INDEPENDENT
GROCERY_SHOPPING: INDEPENDENT

## 2023-12-22 ASSESSMENT — ENCOUNTER SYMPTOMS: SHORTNESS OF BREATH: 1

## 2023-12-22 NOTE — PROGRESS NOTES
Patient ID:   Justine Mesa is a 73 y.o. female with PMH remarkable for interstitial lung disease, osteopenia, hernia repair, hypothyroidism, ID Anemia who presents to the office today for Medicare Annual Wellness Visit Subsequent.    HEALTH MAINTENANCE: Annual Medicare Wellness Visit  Smoking: Never a Smoker  Mammogram (40-75): 7/11/2023 -ve  Labs: 5/4/2023  Colonoscopy (45-75): 9/30/2020  Lung cancer screening (55-80 + 30 pack year + smoking/quit in last 15 years): 10/4/2023  DEXA (65+, q 2 years): osteopenia 2/28/2023  - going to pulmonary rehab currently and it is helping.   - reports that she was recently dx with ILD. Dr Gregory is following and ordered labs which were done 11/17/2023. KLAUS was +ve 1:80, Aldolase was 8.2, aspergillus fumigatus INCONCLUSIVE on 11/17/2023. plan to FU with him in January and review results.     SOCIAL HISTORY:  Social History     Tobacco Use    Smoking status: Never    Smokeless tobacco: Never   Substance Use Topics    Alcohol use: Yes     Comment: occasional    Drug use: Never     IMMUNIZATIONS:  Immunization History   Administered Date(s) Administered    Flu vaccine, quadrivalent, high-dose, preservative free, age 65y+ (FLUZONE) 02/02/2022, 10/22/2022, 11/20/2023    Influenza, High Dose Seasonal, Preservative Free 09/25/2020    Influenza, seasonal, injectable 10/22/2022    Moderna COVID-19 vaccine, Fall 2023, 12 yeasrs and older (50mcg/0.5mL) 12/12/2023    Pfizer Gray Cap SARS-CoV-2 07/08/2022    Pfizer Purple Cap SARS-CoV-2 01/07/2021, 01/28/2021    Pneumococcal conjugate vaccine, 13-valent (PREVNAR 13) 10/20/2017    Pneumococcal polysaccharide vaccine, 23-valent, age 2 years and older (PNEUMOVAX 23) 01/14/2019    SARS-CoV-2, Unspecified 07/08/2022     REVIEW OF SYSTEMS:  Review of Systems   Respiratory:  Positive for shortness of breath.    All other systems reviewed and are negative.    ALLERGIES:  Allergies   Allergen Reactions    Sulfa (Sulfonamide Antibiotics) Unknown       VITAL SIGNS:  Vitals:    12/22/23 1216   BP: 129/87   Pulse: 63   Resp: 18   SpO2: 94%     Physical Exam  Vitals reviewed.   Constitutional:       General: She is not in acute distress.     Appearance: Normal appearance. She is not ill-appearing.   HENT:      Head: Normocephalic and atraumatic.      Right Ear: Tympanic membrane and external ear normal.      Left Ear: Tympanic membrane and external ear normal.      Nose: Nose normal.      Mouth/Throat:      Mouth: Mucous membranes are moist.      Pharynx: Oropharynx is clear.   Eyes:      Conjunctiva/sclera: Conjunctivae normal.      Pupils: Pupils are equal, round, and reactive to light.   Cardiovascular:      Rate and Rhythm: Normal rate and regular rhythm.      Heart sounds: Normal heart sounds. No murmur heard.  Pulmonary:      Effort: Pulmonary effort is normal. No respiratory distress.      Breath sounds: Normal breath sounds. No wheezing.   Abdominal:      General: There is no distension.      Palpations: Abdomen is soft. There is no mass.      Tenderness: There is no abdominal tenderness.   Musculoskeletal:         General: Normal range of motion.      Cervical back: Normal range of motion and neck supple.   Skin:     General: Skin is warm and dry.   Neurological:      General: No focal deficit present.      Mental Status: She is alert and oriented to person, place, and time.      Sensory: No sensory deficit.      Motor: No weakness.      Coordination: Coordination normal.      Gait: Gait normal.   Psychiatric:         Mood and Affect: Mood normal.         Behavior: Behavior normal.     MEDICATIONS:  Current Outpatient Medications on File Prior to Visit   Medication Sig Dispense Refill    albuterol 90 mcg/actuation inhaler Inhale. INHALE 1 TO 2 PUFFS EVERY 4 TO 6 HOURS AS NEEDED.      ascorbic acid (Vitamin C) 1,000 mg tablet Take 1 tablet (1,000 mg) by mouth once daily.      Breo Ellipta 200-25 mcg/dose inhaler inhale 1 puff by mouth once a day 1 each 2     calcium citrate-vitamin D3 (Citracal+D) 315 mg-5 mcg (200 unit) tablet Take 1 tablet by mouth once daily.      cetirizine (ZyrTEC) 10 mg tablet Take 1 tablet (10 mg) by mouth once daily.      cranberry 400 mg capsule Take 500 mg by mouth.      cyanocobalamin (Vitamin B-12) 500 mcg tablet Take by mouth once every 24 hours.      fluticasone (Flonase) 50 mcg/actuation nasal spray Administer 2 sprays into each nostril once daily.      guaiFENesin (Mucinex) 600 mg 12 hr tablet Take 2 tablets (1,200 mg) by mouth once daily.      levothyroxine (Synthroid, Levoxyl) 50 mcg tablet Take 1 tablet (50 mcg) by mouth once daily. 90 tablet 0    montelukast (Singulair) 10 mg tablet Take 1 tablet (10 mg) by mouth once daily at bedtime. 90 tablet 1    oxybutynin XL (Ditropan-XL) 10 mg 24 hr tablet Take 1 tablet (10 mg) by mouth once daily.      dexAMETHasone (Decadron) 4 mg tablet Take 1 tablet (4 mg) by mouth once daily with a meal for 5 days. (Patient not taking: Reported on 11/17/2023) 5 tablet 0    [DISCONTINUED] alendronate (Fosamax) 70 mg tablet Take 1 tablet (70 mg) by mouth 1 (one) time per week.      [DISCONTINUED] topiramate (Topamax) 50 mg tablet 1 TAB every AM, & 1 TAB mid-afternoon 1-2 hours before evening meal.       No current facility-administered medications on file prior to visit.      LABORATORY DATA:  Lab Results   Component Value Date    WBC 9.1 05/04/2023    HGB 14.6 05/04/2023    HCT 46.2 (H) 05/04/2023     05/04/2023    CHOL 232 (H) 05/04/2023    TRIG 169 (H) 05/04/2023    HDL 53.2 05/04/2023    ALT 7 01/24/2023    AST 13 01/24/2023     01/24/2023    K 4.0 01/24/2023     01/24/2023    CREATININE 0.68 01/24/2023    BUN 13 01/24/2023    CO2 32 01/24/2023    TSH 1.22 05/04/2023    INR 1.1 01/24/2023    HGBA1C 5.4 09/24/2020     ASSESSMENT AND PLAN:  Assessment/Plan   Diagnoses and all orders for this visit:  Encounter for annual wellness visit (AWV) in Medicare patient  Chronic pain of both  knees  -     diclofenac sodium (Voltaren) 1 % gel gel; Apply 1 Application topically 4 times a day as needed (knee pain).  Hypothyroidism, unspecified type  Comments:  - c/w synthroid  Interstitial lung disease (CMS/HCC)  Comments:  - FU with pulm as scheduled    --------------------  Written by Cee Gillette RN, acting as a scribe for Dr. Lopez. This note accurately reflects the work and decisions made by Dr. Lopez.     I, Dr. Lopez, attest all medical record entries made by the scribe were under my direction and were personally dictated by me. I have reviewed the chart and agree that the record accurately reflects my performance of the history, physical exam, and assessment and plan.

## 2023-12-27 ENCOUNTER — CLINICAL SUPPORT (OUTPATIENT)
Dept: CARDIAC REHAB | Facility: HOSPITAL | Age: 73
End: 2023-12-27
Payer: MEDICARE

## 2023-12-27 DIAGNOSIS — J84.9 ILD (INTERSTITIAL LUNG DISEASE) (MULTI): Primary | ICD-10-CM

## 2023-12-27 PROCEDURE — 94626 PHY/QHP OP PULM RHB W/MNTR: CPT | Performed by: PEDIATRICS

## 2023-12-27 NOTE — PROGRESS NOTES
Pulmonary Daily Rehabilitation Assessment    Name: Justine Mesa  Medical Record Number: [id number]  YOB: 1950    Today’s Date: 12/27/2023  Primary Care Physician: Odell Sharma MD    Session #: 8    Oxygen Assessment    SP02 rest: 99 %  SP02 exertion: 99 %    Oxygen Use  Requires supplemental O2:    Liters at Rest: ra   Liters with Exertion: ra  Using O2 as prescribed:       Weight Management    Height: 5'3  Weight: 214.5  BMI:        Daily Activity Log   Modality METS Load Duration   1 Pre-Exercise  6/0      2 Treadmill   9/1 2.3 1.7 15:00   3 Nustep 4000       4 Recumbent Cycle   9/2 W 18 2,5 15:00   5 Weights       6 UBE   11/2 W0-5 Rpm 43-45 12:00   7 Rower      8 Aero Bike      9 Post-Exercise  6/0            Other Core Components/Risk Factor Assessment:    Blood Pressure Management:  Hx of Hypertension: No   Resting BP: 129/83  POST BP: 124/75      General Comments:  Education: Airway clearance      Rehab Staff Signature: Juhi Andres, RRT

## 2024-01-03 ENCOUNTER — CLINICAL SUPPORT (OUTPATIENT)
Dept: CARDIAC REHAB | Facility: HOSPITAL | Age: 74
End: 2024-01-03
Payer: MEDICARE

## 2024-01-03 DIAGNOSIS — J84.9 ILD (INTERSTITIAL LUNG DISEASE) (MULTI): Primary | ICD-10-CM

## 2024-01-03 PROCEDURE — 94626 PHY/QHP OP PULM RHB W/MNTR: CPT | Performed by: PEDIATRICS

## 2024-01-03 NOTE — PROGRESS NOTES
Pulmonary Daily Rehabilitation Assessment    Name: Justine Mesa  Medical Record Number: [id number]  YOB: 1950    Today’s Date: 1/3/2024  Primary Care Physician: Odell Sharma MD    Session #: 10    Oxygen Assessment    SP02 rest: 99 %  SP02 exertion: 96 %    Oxygen Use  Requires supplemental O2:    Liters at Rest: ra   Liters with Exertion: ra  Using O2 as prescribed: na      Weight Management    Height: 5'3  Weight: 214lbs  BMI:        Daily Activity Log   Modality METS Load Duration   1 Pre-Exercise   6/0      2 Treadmill   13/3 2.3 1.8 16:00   3 Nustep 4000   9/2 3.1-3.4 4.0 18:00   4 Recumbent Cycle       5 Weights       6 UBE  10/2 W0-5 Rpm 47-92 12:00   7 Rower      8 Aero Bike      9 Post-Exercise  6/0            Other Core Components/Risk Factor Assessment:    Blood Pressure Management:  Hx of Hypertension: No   Resting BP: 134/71  POST BP: 146/86      General Comments:    Education: Breathing retraining    Rehab Staff Signature: Juhi Andres, RRT

## 2024-01-05 ENCOUNTER — OFFICE VISIT (OUTPATIENT)
Dept: PULMONOLOGY | Facility: HOSPITAL | Age: 74
End: 2024-01-05
Payer: MEDICARE

## 2024-01-05 VITALS
OXYGEN SATURATION: 96 % | TEMPERATURE: 97.7 F | HEART RATE: 58 BPM | DIASTOLIC BLOOD PRESSURE: 80 MMHG | BODY MASS INDEX: 36.39 KG/M2 | SYSTOLIC BLOOD PRESSURE: 135 MMHG | WEIGHT: 212 LBS

## 2024-01-05 DIAGNOSIS — J84.9 ILD (INTERSTITIAL LUNG DISEASE) (MULTI): Primary | ICD-10-CM

## 2024-01-05 DIAGNOSIS — G47.33 OSA (OBSTRUCTIVE SLEEP APNEA): ICD-10-CM

## 2024-01-05 DIAGNOSIS — J45.20 MILD INTERMITTENT ASTHMA, UNSPECIFIED WHETHER COMPLICATED (HHS-HCC): ICD-10-CM

## 2024-01-05 DIAGNOSIS — R91.1 LUNG NODULE: ICD-10-CM

## 2024-01-05 PROCEDURE — 99214 OFFICE O/P EST MOD 30 MIN: CPT | Performed by: INTERNAL MEDICINE

## 2024-01-05 PROCEDURE — 1036F TOBACCO NON-USER: CPT | Performed by: INTERNAL MEDICINE

## 2024-01-05 PROCEDURE — 1159F MED LIST DOCD IN RCRD: CPT | Performed by: INTERNAL MEDICINE

## 2024-01-05 PROCEDURE — 1126F AMNT PAIN NOTED NONE PRSNT: CPT | Performed by: INTERNAL MEDICINE

## 2024-01-05 SDOH — ECONOMIC STABILITY: FOOD INSECURITY: WITHIN THE PAST 12 MONTHS, YOU WORRIED THAT YOUR FOOD WOULD RUN OUT BEFORE YOU GOT MONEY TO BUY MORE.: NEVER TRUE

## 2024-01-05 SDOH — ECONOMIC STABILITY: FOOD INSECURITY: WITHIN THE PAST 12 MONTHS, THE FOOD YOU BOUGHT JUST DIDN'T LAST AND YOU DIDN'T HAVE MONEY TO GET MORE.: NEVER TRUE

## 2024-01-05 ASSESSMENT — LIFESTYLE VARIABLES
HOW MANY STANDARD DRINKS CONTAINING ALCOHOL DO YOU HAVE ON A TYPICAL DAY: 1 OR 2
HOW OFTEN DO YOU HAVE SIX OR MORE DRINKS ON ONE OCCASION: NEVER
AUDIT-C TOTAL SCORE: 1
HOW OFTEN DO YOU HAVE A DRINK CONTAINING ALCOHOL: MONTHLY OR LESS
SKIP TO QUESTIONS 9-10: 1

## 2024-01-05 ASSESSMENT — PATIENT HEALTH QUESTIONNAIRE - PHQ9
SUM OF ALL RESPONSES TO PHQ9 QUESTIONS 1 AND 2: 0
2. FEELING DOWN, DEPRESSED OR HOPELESS: NOT AT ALL
1. LITTLE INTEREST OR PLEASURE IN DOING THINGS: NOT AT ALL

## 2024-01-05 ASSESSMENT — PAIN SCALES - GENERAL: PAINLEVEL: 0-NO PAIN

## 2024-01-05 NOTE — PATIENT INSTRUCTIONS
Ms Moshe , it was a pleasure seeing you in clinic today. We discussed the following:     -You will continue to use your inhalers.  -You will continue with your pulmonary rehab  -We will hold off on starting anti-fibrotic for now and watch for progression    Will see you back in clinic in 6 months with repeat breathing test and walking test
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
## 2024-01-08 ENCOUNTER — CLINICAL SUPPORT (OUTPATIENT)
Dept: CARDIAC REHAB | Facility: HOSPITAL | Age: 74
End: 2024-01-08
Payer: MEDICARE

## 2024-01-08 DIAGNOSIS — J84.9 ILD (INTERSTITIAL LUNG DISEASE) (MULTI): Primary | ICD-10-CM

## 2024-01-08 PROCEDURE — 94626 PHY/QHP OP PULM RHB W/MNTR: CPT | Performed by: PEDIATRICS

## 2024-01-08 NOTE — PROGRESS NOTES
Pulmonary Daily Rehabilitation Assessment    Name: Justine Mesa  Medical Record Number: [id number]  YOB: 1950    Today’s Date: 1/8/2024  Primary Care Physician: Odell Sharma MD    Session #: 12    Oxygen Assessment    SP02 rest: 99 %  SP02 exertion: 97 %    Oxygen Use  Requires supplemental O2:    Liters at Rest: ra   Liters with Exertion: ra  Using O2 as prescribed:       Weight Management    Height: 5'3  Weight: 217lbs  BMI:        Daily Activity Log   Modality METS Load Duration   1 Pre-Exercise   6/0      2 Treadmill   13/3 2.3 1.8 17;00   3 Nustep 4000       4 Recumbent Cycle   SCIFIT  11/2 W 20 2,5 16:00   5 Weights  11/2  CURLS, BUTTERRFLY PRESS LEGS FRONT & SIDE LIFT 3 SETS 10 EACH ARMS 3LB. LEGS 2LBS 18:00   6 UBE      7 Rower      8 Aero Bike      9 Post-Exercise   6/0            Other Core Components/Risk Factor Assessment:    Blood Pressure Management:  Hx of Hypertension: No   Resting BP: 133/76  POST BP: 137/83      General Comments:  EDUCATION: Stress & Anxiety management - practiced purse lip breathing today      Rehab Staff Signature: Juhi Andres, RRT

## 2024-01-10 ENCOUNTER — CLINICAL SUPPORT (OUTPATIENT)
Dept: CARDIAC REHAB | Facility: HOSPITAL | Age: 74
End: 2024-01-10
Payer: MEDICARE

## 2024-01-10 DIAGNOSIS — J84.9 ILD (INTERSTITIAL LUNG DISEASE) (MULTI): Primary | ICD-10-CM

## 2024-01-10 PROCEDURE — 94626 PHY/QHP OP PULM RHB W/MNTR: CPT | Performed by: PEDIATRICS

## 2024-01-10 NOTE — PROGRESS NOTES
Pulmonary Daily Rehabilitation Assessment    Name: Justine Mesa  Medical Record Number: [id number]  YOB: 1950    Today’s Date: 1/10/2024  Primary Care Physician: Odell Sharma MD    Session #: 13      Oxygen Assessment    SP02 rest: 100%  SP02 exertion: 95 %    Oxygen Use  Requires supplemental O2:    Liters at Rest: ra   Liters with Exertion: ra  Using O2 as prescribed:       Weight Management    Height: 5'3  Weight: 215  BMI:        Daily Activity Log   Modality METS Load Duration   1 Pre-Exercise  6/0      2 Treadmill   13/3 2.3 1.8 20:00   3 Nustep 4000   13/3 3.3-3.4 5.0 20:00   4 Recumbent Cycle       5 Weights       6 UBE   13/3 W5-10 Rpm49-  58 12:00   7 Rower      8 Aero Bike      9 Post-Exercise  6/0            Other Core Components/Risk Factor Assessment:    Blood Pressure Management:  Hx of Hypertension: No   Resting BP: 138/77  POST BP: 135/80      General Comments:        Rehab Staff Signature: Juhi Andres, RRT

## 2024-01-15 ENCOUNTER — CLINICAL SUPPORT (OUTPATIENT)
Dept: CARDIAC REHAB | Facility: HOSPITAL | Age: 74
End: 2024-01-15
Payer: MEDICARE

## 2024-01-15 DIAGNOSIS — J84.9 ILD (INTERSTITIAL LUNG DISEASE) (MULTI): Primary | ICD-10-CM

## 2024-01-15 PROCEDURE — 94626 PHY/QHP OP PULM RHB W/MNTR: CPT | Performed by: PEDIATRICS

## 2024-01-15 NOTE — PROGRESS NOTES
Pulmonary Daily Rehabilitation Assessment    Name: Justine Mesa  Medical Record Number: [id number]  YOB: 1950    Today’s Date: 1/15/2024  Primary Care Physician: Odell Sharma MD    Session #: 15    Oxygen Assessment    SP02 rest: 99 %  SP02 exertion: 96 %    Oxygen Use  Requires supplemental O2:    Liters at Rest: ra   Liters with Exertion: ra  Using O2 as prescribed: na      Weight Management    Height: 5'3  Weight: 214lbs  BMI:        Daily Activity Log   Modality METS Load Duration   1 Pre-Exercise  6/0      2 Treadmill    11/2 2.3 1.8 21  ;00   3 Nustep 4000       4 Recumbent Cycle scifit   12/3 W11-20 2.5 18:00   5 Weights       6 UBE      7 Rower    11/2 W42-55 2.2mph 4:00   8 Aero Bike      9 Post-Exercise   6/0            Other Core Components/Risk Factor Assessment:    Blood Pressure Management:  Hx of Hypertension: No   Resting BP: 126/77  POST BP: 132/78      General Comments:        Rehab Staff Signature: Juhi Andres, RRT   no

## 2024-01-17 ENCOUNTER — CLINICAL SUPPORT (OUTPATIENT)
Dept: CARDIAC REHAB | Facility: HOSPITAL | Age: 74
End: 2024-01-17
Payer: MEDICARE

## 2024-01-17 DIAGNOSIS — J43.2 CENTRILOBULAR EMPHYSEMA (MULTI): ICD-10-CM

## 2024-01-17 DIAGNOSIS — J84.9 ILD (INTERSTITIAL LUNG DISEASE) (MULTI): Primary | ICD-10-CM

## 2024-01-17 PROCEDURE — 94626 PHY/QHP OP PULM RHB W/MNTR: CPT | Performed by: PEDIATRICS

## 2024-01-17 NOTE — PROGRESS NOTES
Pulmonary Daily Rehabilitation Assessment    Name: Justine Mesa  Medical Record Number: [id number]  YOB: 1950    Today’s Date: 1/17/2024  Primary Care Physician: Odell Sharma MD    Session #: 16      Oxygen Assessment    SP02 rest: 99 %  SP02 exertion: 96 %    Oxygen Use  Requires supplemental O2:    Liters at Rest: ra   Liters with Exertion: ra  Using O2 as prescribed: na      Weight Management    Height: 5'3  Weight: 214lbs  BMI:        Daily Activity Log   Modality METS Load Duration   1 Pre-Exercise  6/0      2 Treadmill   12/3 2.3    2.4 1.8 Inc. To 1.9 @ 11:00min 20:00   3 Nustep 4000     13/3 3.1/3.4 5.0 21:00   4 Recumbent Cycle scifit  13/3 W15-24 2.5 20:00   5 Weights       6 UBE      7 Rower      8 Aero Bike      9 Post-Exercise   6/0            Other Core Components/Risk Factor Assessment:    Blood Pressure Management:  Hx of Hypertension: No   Resting BP: 129/66  POST BP: 121/82      General Comments:        Rehab Staff Signature: Juhi Andres, RRT

## 2024-01-22 ENCOUNTER — CLINICAL SUPPORT (OUTPATIENT)
Dept: CARDIAC REHAB | Facility: HOSPITAL | Age: 74
End: 2024-01-22
Payer: MEDICARE

## 2024-01-22 DIAGNOSIS — J84.9 ILD (INTERSTITIAL LUNG DISEASE) (MULTI): Primary | ICD-10-CM

## 2024-01-22 PROCEDURE — 94626 PHY/QHP OP PULM RHB W/MNTR: CPT | Performed by: PEDIATRICS

## 2024-01-22 NOTE — PROGRESS NOTES
Pulmonary Daily Rehabilitation Assessment    Name: Justine Mesa  Medical Record Number: [id number]  YOB: 1950    Today’s Date: 1/22/2024  Primary Care Physician: Odell Sharma MD    Session #: 18    Oxygen Assessment    SP02 rest: ra %  SP02 exertion: ra %    Oxygen Use  Requires supplemental O2:    Liters at Rest: ra   Liters with Exertion: ra  Using O2 as prescribed: na      Weight Management    Height: 5'3  Weight: 214.5  BMI:        Daily Activity Log   Modality METS Load Duration   1 Pre-Exercise  6/0      2 Treadmill   13/3 2.3 1.8 22:00   3 Nustep 4000       4 Recumbent Cycle       5 Weights       6 UBE   11/2 W 5-10 Edl71-49 14:00   7 Rower  13/2 W 45-54 Mph  1.9-  2.2 6:00   8 Aero Bike      9 Post-Exercise  6/0            Other Core Components/Risk Factor Assessment:    Blood Pressure Management:  Hx of Hypertension: No   Resting BP: 121/79  POST BP: 116/77      General Comments:        Rehab Staff Signature: Juhi Andres, RRT

## 2024-01-24 ENCOUNTER — CLINICAL SUPPORT (OUTPATIENT)
Dept: CARDIAC REHAB | Facility: HOSPITAL | Age: 74
End: 2024-01-24
Payer: MEDICARE

## 2024-01-24 DIAGNOSIS — J84.9 ILD (INTERSTITIAL LUNG DISEASE) (MULTI): Primary | ICD-10-CM

## 2024-01-24 PROCEDURE — 94626 PHY/QHP OP PULM RHB W/MNTR: CPT | Performed by: PEDIATRICS

## 2024-01-24 NOTE — PROGRESS NOTES
Pulmonary Daily Rehabilitation Assessment    Name: Justine Mesa  Medical Record Number: [id number]  YOB: 1950    Today’s Date: 1/24/2024  Primary Care Physician: Odell Sharma MD    Session #: 19    Oxygen Assessment    SP02 rest: 97 %  SP02 exertion: 98 %    Oxygen Use  Requires supplemental O2:    Liters at Rest: ra   Liters with Exertion: ra  Using O2 as prescribed: na      Weight Management    Height: 5'3  Weight: 216  BMI:        Daily Activity Log   Modality METS Load Duration   1 Pre-Exercise  6/0      2 Treadmill       3 Nustep 4000   13/3 2.9-3.4 5.0 23:00   4 Recumbent Cycle scifit  12/3 W15-19 2.5 21:00   5 Weights       6 UBE  13/3 W 5-10 Rpm 46-52 12:00   7 Rower      8 Aero Bike      9 Post-Exercise  6/0            Other Core Components/Risk Factor Assessment:    Blood Pressure Management:  Hx of Hypertension: No   Resting BP: 136/79  POST BP: 125/72      General Comments:        Rehab Staff Signature: Juhi Andres, RRT

## 2024-01-29 ENCOUNTER — CLINICAL SUPPORT (OUTPATIENT)
Dept: CARDIAC REHAB | Facility: HOSPITAL | Age: 74
End: 2024-01-29
Payer: MEDICARE

## 2024-01-29 DIAGNOSIS — J84.9 ILD (INTERSTITIAL LUNG DISEASE) (MULTI): Primary | ICD-10-CM

## 2024-01-29 PROCEDURE — 94626 PHY/QHP OP PULM RHB W/MNTR: CPT | Performed by: PEDIATRICS

## 2024-01-29 NOTE — PROGRESS NOTES
Pulmonary Daily Rehabilitation Assessment    Name: Justine Mesa  Medical Record Number: [id number]  YOB: 1950    Today’s Date: 1/29/2024  Primary Care Physician: Odell Sharma MD    Session #: 21    Oxygen Assessment    SP02 rest: 99 %  SP02 exertion: 96 %    Oxygen Use  Requires supplemental O2:    Liters at Rest: ra   Liters with Exertion: ra  Using O2 as prescribed: na      Weight Management    Height: 5'3  Weight: 215lbs  BMI:        Daily Activity Log   Modality METS Load Duration   1 Pre-Exercise  6/0      2 Treadmill    13/2.5 2.5 1.8 inc.0.5 22:00   3 Nustep 4000   14/3 3.3-3.5 5.0 21:00   4 Recumbent Cycle       5 Weights       6 UBE  13/3 W5-10 Rpm 52-58 13:00   7 Rower      8 Aero Bike      9 Post-Exercise  6/0            Other Core Components/Risk Factor Assessment:    Blood Pressure Management:  Hx of Hypertension: No   Resting BP: 149/84  POST BP: 142/82      General Comments:    Education:Advanced directives/DNR: Materials given hospice & Advanced directives    Rehab Staff Signature: Juhi Andres, RRT

## 2024-01-31 ENCOUNTER — APPOINTMENT (OUTPATIENT)
Dept: CARDIAC REHAB | Facility: HOSPITAL | Age: 74
End: 2024-01-31
Payer: MEDICARE

## 2024-02-05 ENCOUNTER — CLINICAL SUPPORT (OUTPATIENT)
Dept: CARDIAC REHAB | Facility: HOSPITAL | Age: 74
End: 2024-02-05
Payer: MEDICARE

## 2024-02-05 DIAGNOSIS — J84.9 ILD (INTERSTITIAL LUNG DISEASE) (MULTI): Primary | ICD-10-CM

## 2024-02-05 PROCEDURE — 94626 PHY/QHP OP PULM RHB W/MNTR: CPT | Performed by: PEDIATRICS

## 2024-02-05 NOTE — PROGRESS NOTES
Pulmonary Daily Rehabilitation Assessment    Name: Justien Mesa  Medical Record Number: [id number]  YOB: 1950    Today’s Date: 2/5/2024  Primary Care Physician: Odell Sharma MD    Session #: 23    Oxygen Assessment    SP02 rest: 99 %  SP02 exertion: 96 %    Oxygen Use  Requires supplemental O2:    Liters at Rest: ra   Liters with Exertion: ra  Using O2 as prescribed:       Weight Management    Height: 5'3  Weight: 213lbs  BMI:        Daily Activity Log   Modality METS Load Duration   1 Pre-Exercise  6/0      2 Treadmill       3 Nustep 4000   13/3 3.3-3.6 5.0 23:00   4 Recumbent Cycle scifit  13/3 W12-21 2.5 23:00   5 Weights       6 UBE      7 Rower   12/3 W 34-40 Mph 2.2-2.4 8:00   8 Aero Bike      9 Post-Exercise  6/0            Other Core Components/Risk Factor Assessment:    Blood Pressure Management:  Hx of Hypertension: No   Resting BP: 142/77  POST BP: 133/78      General Comments:  Education: Energy Conservation & handout given      Rehab Staff Signature: Juhi Andres, RRT

## 2024-02-07 ENCOUNTER — CLINICAL SUPPORT (OUTPATIENT)
Dept: CARDIAC REHAB | Facility: HOSPITAL | Age: 74
End: 2024-02-07
Payer: MEDICARE

## 2024-02-07 ENCOUNTER — PROCEDURE VISIT (OUTPATIENT)
Dept: PODIATRY | Facility: CLINIC | Age: 74
End: 2024-02-07
Payer: MEDICARE

## 2024-02-07 DIAGNOSIS — M79.672 PAIN IN BOTH FEET: ICD-10-CM

## 2024-02-07 DIAGNOSIS — R60.0 EDEMA OF LOWER EXTREMITY: ICD-10-CM

## 2024-02-07 DIAGNOSIS — J84.9 ILD (INTERSTITIAL LUNG DISEASE) (MULTI): Primary | ICD-10-CM

## 2024-02-07 DIAGNOSIS — B35.1 ONYCHOMYCOSIS OF TOENAIL: ICD-10-CM

## 2024-02-07 DIAGNOSIS — M79.676 PAIN AROUND TOENAIL: Primary | ICD-10-CM

## 2024-02-07 DIAGNOSIS — M79.671 PAIN IN BOTH FEET: ICD-10-CM

## 2024-02-07 PROCEDURE — 11721 DEBRIDE NAIL 6 OR MORE: CPT | Performed by: PODIATRIST

## 2024-02-07 PROCEDURE — 94626 PHY/QHP OP PULM RHB W/MNTR: CPT | Performed by: PEDIATRICS

## 2024-02-07 NOTE — PROGRESS NOTES
Pulmonary Daily Rehabilitation Assessment    Name: Justine Mesa  Medical Record Number: [id number]  YOB: 1950    Today’s Date: 2/7/2024  Primary Care Physician: Odell Sharma MD    Session #: 24      Oxygen Assessment    SP02 rest: 98 %  SP02 exertion: 95 %    Oxygen Use  Requires supplemental O2:    Liters at Rest: ra   Liters with Exertion: ra  Using O2 as prescribed: na      Weight Management    Height: 5'3  Weight: 213lbs  BMI:        Daily Activity Log   Modality METS Load Duration   1 Pre-Exercise  6/0      2 Treadmill   11/2 2.5 1.8 inc0.5 22:00   3 Nustep 4000       4 Recumbent Cycle       5 Weights       6 UBE   11/2 W 5-10 Rpm 51-55 15:00   7 Rower      8 Aero Bike   9/2 w54 Rpm31-  33 8;00   9 Post-Exercise 6/0            Other Core Components/Risk Factor Assessment:    Blood Pressure Management:  Hx of Hypertension: No   Resting BP: 119/81  POST BP: 131/79      General Comments:        Rehab Staff Signature: Juhi Andres, RRT

## 2024-02-07 NOTE — PROGRESS NOTES
History of Present Illness:   Patient states they are here for help trimming nails  Denies trauma  Unable to safely trim on own  Has swelling in legs  No other pedal complaints  Past Medical History  Past Medical History:   Diagnosis Date    Acute bronchitis, unspecified     Acute bronchitis, unspecified organism    Acute frontal sinusitis, unspecified 01/06/2020    Acute frontal sinusitis    Acute frontal sinusitis, unspecified 03/07/2018    Acute frontal sinusitis    Acute maxillary sinusitis, unspecified 01/06/2020    Acute maxillary sinusitis    Acute maxillary sinusitis, unspecified 11/09/2017    Acute maxillary antritis    Acute tonsillitis due to other specified organisms     Acute tonsillitis due to other specified organisms    Acute tonsillitis due to other specified organisms 01/31/2017    Acute tonsillitis due to other specified organisms    Acute upper respiratory infection, unspecified 04/18/2017    Viral URI with cough    Acute upper respiratory infection, unspecified 06/07/2017    Acute URI    Chronic frontal sinusitis 03/07/2018    Frontal sinusitis    Diaphragmatic hernia without obstruction or gangrene 06/13/2022    Paraesophageal hiatal hernia    Hypothyroidism, unspecified 01/04/2022    Hypothyroidism    Pain in unspecified lower leg 07/16/2019    Calf pain    Personal history of other diseases of the digestive system 07/11/2022    History of gastroesophageal reflux (GERD)    Personal history of other diseases of the digestive system     History of gastroesophageal reflux (GERD)    Personal history of other diseases of the digestive system     History of hemorrhoids    Personal history of other diseases of the respiratory system 03/07/2018    History of sore throat    Personal history of other diseases of the respiratory system 11/09/2017    History of acute pharyngitis    Personal history of other diseases of the respiratory system 11/21/2017    History of acute bronchitis    Personal history  of other diseases of the respiratory system 03/07/2018    History of acute pharyngitis    Personal history of other specified conditions     History of dizziness    Personal history of urinary calculi     Personal history of renal calculi       Medications and Allergies have been reviewed.    Review Of Systems:  GENERAL: No weight loss, malaise or fevers.  HEENT: Negative for frequent or significant headaches,   RESPIRATORY: Negative for cough, wheezing or shortness of breath.  CARDIOVASCULAR: Negative for chest pain, leg swelling or palpitations.      Examination of Both Lower Extremities:     Vascular exam: Dorsalis pedis and Posterior Tibial pulses palpable 2/4 bilateral. Capillary refill time less than 3 seconds b/l. No Hair growth noted to digits. Mild +1 non pitting edema noted. Skin temp warm to warm from proximal to distal b/l. No varicosities noted.     Neurologic exam: Vibratory, protective and proprioception intact b/l. No neurologic deficits noted.      Musculoskeletal exam: Muscle strength 5/5 for all major muscle groups tested in the lower extremity b/l. Contracted digits and bunion deformity noted b/l. Mid foot spurring noted b/l and decreased longitudinal arch. Pain with palpation noted to left dorsal midfoot. No edema, erythema or ecchymosis noted.      Dermatologic exam: Nails 1-5 b/l discolored and elongated. No SOI noted Webspaces 1-4 b/l are clean, dry and intact. No primary or secondary skin lesions noted. No open lesions or wounds noted at this time .  1. Pain around toenail        2. Edema of lower extremity        3. Onychomycosis of toenail        4. Pain in both feet          Patient exam and eval  Nails debrided  Monitor swelling  Recommend compression socks  No other pedal concerns  Fu prn  Patient was in agreement to this plan. All questions answered.      PCP Dr. Lopez last visit 12/22/23    Dennise Hays, LESA  [phone]  [address]  Fax: [phone]

## 2024-02-12 ENCOUNTER — CLINICAL SUPPORT (OUTPATIENT)
Dept: CARDIAC REHAB | Facility: HOSPITAL | Age: 74
End: 2024-02-12
Payer: MEDICARE

## 2024-02-12 DIAGNOSIS — J84.9 ILD (INTERSTITIAL LUNG DISEASE) (MULTI): Primary | ICD-10-CM

## 2024-02-12 PROCEDURE — 94626 PHY/QHP OP PULM RHB W/MNTR: CPT | Performed by: PEDIATRICS

## 2024-02-12 NOTE — PROGRESS NOTES
Pulmonary Daily Rehabilitation Assessment    Name: Justine Mesa  Medical Record Number: [id number]  YOB: 1950    Today’s Date: 2/12/2024  Primary Care Physician: Odlel Sharma MD    Session #: 26    Oxygen Assessment    SP02 rest: 99 %  SP02 exertion: 97 %    Oxygen Use  Requires supplemental O2:    Liters at Rest: ra   Liters with Exertion: ra  Using O2 as prescribed: na      Weight Management    Height: 5'3  Weight: 214lbs  BMI:        Daily Activity Log   Modality METS Load Duration   1 Pre-Exercise  6/0      2 Treadmill   12/2 2.5 1.8inc0.5 20:00   3 Nustep 4000   11/2 3.4-3,5 6.0 25:00   4 Recumbent Cycle       5 Weights       6 UBE      7 Rower      8 Aero Bike  13/3 W 63-77 Rpm33- 36 9:00   9 Post-Exercise6/0            Other Core Components/Risk Factor Assessment:    Blood Pressure Management:  Hx of Hypertension: No   Resting BP: 129/78  POST BP: 122/78      General Comments:  Education Oxygen and term. paper handout      Rehab Staff Signature: Juhi Andres, RRT

## 2024-02-14 ENCOUNTER — CLINICAL SUPPORT (OUTPATIENT)
Dept: CARDIAC REHAB | Facility: HOSPITAL | Age: 74
End: 2024-02-14
Payer: MEDICARE

## 2024-02-14 DIAGNOSIS — J84.9 ILD (INTERSTITIAL LUNG DISEASE) (MULTI): Primary | ICD-10-CM

## 2024-02-14 PROCEDURE — 94626 PHY/QHP OP PULM RHB W/MNTR: CPT | Performed by: PEDIATRICS

## 2024-02-14 NOTE — PROGRESS NOTES
Pulmonary Daily Rehabilitation Assessment    Name: Justine Mesa  Medical Record Number: [id number]  YOB: 1950    Today’s Date: 2/14/2024  Primary Care Physician: Odell Sharma MD    Session #: 27    Oxygen Assessment    SP02 rest: 99%  SP02 exertion: 97 %    Oxygen Use  Requires supplemental O2:    Liters at Rest: ra   Liters with Exertion: ra  Using O2 as prescribed: na      Weight Management    Height: 5'3  Weight: 214lbs  BMI:        Daily Activity Log   Modality METS Load Duration   1 Pre-Exercise  6/0      2 Treadmill   12/2 2.5 1.8inc.0.5 22:00   3 Nustep 4000       4 Recumbent Cycle       5 Weights       6 UBE      7 Rower  12/2 W42-48 Mph2.4 10:00   8 Aero Bike      9 Post-Exercise   6/0            Other Core Components/Risk Factor Assessment:    Blood Pressure Management:  Hx of Hypertension: No   Resting BP: 130/83  POST BP: 123/79      General Comments:        Rehab Staff Signature: Juhi Andres, RRT

## 2024-02-18 DIAGNOSIS — E03.9 HYPOTHYROIDISM, UNSPECIFIED TYPE: ICD-10-CM

## 2024-02-19 ENCOUNTER — CLINICAL SUPPORT (OUTPATIENT)
Dept: CARDIAC REHAB | Facility: HOSPITAL | Age: 74
End: 2024-02-19
Payer: MEDICARE

## 2024-02-19 DIAGNOSIS — J84.9 ILD (INTERSTITIAL LUNG DISEASE) (MULTI): Primary | ICD-10-CM

## 2024-02-19 PROCEDURE — 94626 PHY/QHP OP PULM RHB W/MNTR: CPT | Performed by: PEDIATRICS

## 2024-02-19 RX ORDER — LEVOTHYROXINE SODIUM 50 UG/1
50 TABLET ORAL DAILY
Qty: 90 TABLET | Refills: 0 | Status: SHIPPED | OUTPATIENT
Start: 2024-02-19 | End: 2024-05-24

## 2024-02-19 NOTE — PROGRESS NOTES
Pulmonary Daily Rehabilitation Assessment    Name: Justine Mesa  Medical Record Number: [id number]  YOB: 1950    Today’s Date: 2/19/2024  Primary Care Physician: Odell Sharma MD    Session #:29    Oxygen Assessment    SP02 rest: 98 %  SP02 exertion: 97 %    Oxygen Use  Requires supplemental O2:    Liters at Rest: ra   Liters with Exertion: ra  Using O2 as prescribed: na      Weight Management    Height: 5'3  Weight: 214lbs  BMI: 37.6       Daily Activity Log   Modality METS Load Duration   1 Pre-Exercise6/1      2 Treadmill       3 Nustep 4000       4 Recumbent Cycle scifit   11/2 W18-21 2.5 25:00   5 Weights       6 UBE  11/2 W5-10 Rpm 46-57 16:00   7 Rower      8 Aero Bike      9 Cardio-fit      10 Post-Exercise  6/1            Other Core Components/Risk Factor Assessment:    Blood Pressure Management:  Hx of Hypertension: yes  Resting BP: 143/77  POST BP: 130/77      General Comments:  Education: Nution and handouts given      Rehab Staff Signature: Juhi Andres, RRT

## 2024-02-21 ENCOUNTER — CLINICAL SUPPORT (OUTPATIENT)
Dept: CARDIAC REHAB | Facility: HOSPITAL | Age: 74
End: 2024-02-21
Payer: MEDICARE

## 2024-02-21 DIAGNOSIS — J84.9 INTERSTITIAL LUNG DISEASE (MULTI): Primary | ICD-10-CM

## 2024-02-21 PROCEDURE — 94626 PHY/QHP OP PULM RHB W/MNTR: CPT | Performed by: PEDIATRICS

## 2024-02-21 NOTE — PROGRESS NOTES
Pulmonary Daily Rehabilitation Assessment    Name: Justine Mesa  Medical Record Number: [id number]  YOB: 1950    Today’s Date: 2/21/2024  Primary Care Physician: Odell Sharma MD    Session #: 30    Oxygen Assessment    SP02 rest: 95 %  SP02 exertion: 96%    Oxygen Use  Requires supplemental O2:    Liters at Rest: ra   Liters with Exertion: ra  Using O2 as prescribed: na      Weight Management    Height: 5'3  Weight: 213lbs  BMI:        Daily Activity Log   Modality METS Load Duration   1 Pre-Exercise  6/0      2 Treadmill   13/2 2.5 1.8  0.5INC. 20:00   3 Nustep 4000       4 Recumbent Cycle       5 Weights       6 UBE      7 Rower   13/3 W30-31 MPH 2,2 12:00   8 Aero Bike   11/2 W43-51 Rpm 29-34 12:00   9 Cardio-fit      10 Post-Exercise            Other Core Components/Risk Factor Assessment:    Blood Pressure Management:  Hx of Hypertension: no  Resting BP: 115/78  POST BP: 116/77      General Comments:        Rehab Staff Signature: Juhi Andres, RRT

## 2024-02-26 ENCOUNTER — CLINICAL SUPPORT (OUTPATIENT)
Dept: CARDIAC REHAB | Facility: HOSPITAL | Age: 74
End: 2024-02-26
Payer: MEDICARE

## 2024-02-26 DIAGNOSIS — J84.9 INTERSTITIAL LUNG DISEASE (MULTI): Primary | ICD-10-CM

## 2024-02-26 PROCEDURE — 94626 PHY/QHP OP PULM RHB W/MNTR: CPT | Performed by: PEDIATRICS

## 2024-02-26 NOTE — PROGRESS NOTES
Pulmonary Daily Rehabilitation Assessment    Name: Justine Mesa  Medical Record Number: [id number]  YOB: 1950    Today’s Date: 2/26/2024  Primary Care Physician: Odell Sharma MD    Session #: 32    Oxygen Assessment    SP02 rest: 99 %  SP02 exertion: 98 %    Oxygen Use  Requires supplemental O2:    Liters at Rest: ra   Liters with Exertion: ra  Using O2 as prescribed: na      Weight Management    Height: 5'3  Weight: 214lbs  BMI:        Daily Activity Log   Modality METS Load Duration   1 Pre-Exercise   6/0      2 Treadmill    13/3 2.5 1.8 inc.0.5 21:00   3 Nustep 4000       4 Recumbent Cycle       5 Weights       6 UBE   12/2 W5-10 Rpm64- 71 17:00   7 Rower      8 Aero Bike      9 Cardio-fit      10 Post-Exercise   6/0            Other Core Components/Risk Factor Assessment:    Blood Pressure Management:  Hx of Hypertension: no  Resting BP: 134/84  POST BP: 133/80      General Comments:  Education: Medications & handouts given      Rehab Staff Signature: Juhi Andres, RRT

## 2024-02-28 ENCOUNTER — CLINICAL SUPPORT (OUTPATIENT)
Dept: CARDIAC REHAB | Facility: HOSPITAL | Age: 74
End: 2024-02-28
Payer: MEDICARE

## 2024-02-28 DIAGNOSIS — J84.9 ILD (INTERSTITIAL LUNG DISEASE) (MULTI): Primary | ICD-10-CM

## 2024-02-28 PROCEDURE — 94626 PHY/QHP OP PULM RHB W/MNTR: CPT | Performed by: PEDIATRICS

## 2024-02-28 NOTE — PROGRESS NOTES
Pulmonary Daily Rehabilitation Assessment    Name: Justine Mesa  Medical Record Number: [id number]  YOB: 1950    Today’s Date: 2/28/2024  Primary Care Physician: Odell Sharma MD    Session #: 33    Oxygen Assessment    SP02 rest: 99 %  SP02 exertion: 95 %    Oxygen Use  Requires supplemental O2:    Liters at Rest: ra   Liters with Exertion: ra  Using O2 as prescribed: na      Weight Management    Height: 5'3  Weight: 214lbs  BMI:        Daily Activity Log   Modality METS Load Duration   1 Pre-Exercise   6/0      2 Treadmill       3 Nustep 4000   13/3 3.4-3.6 6.0 20:00   4 Recumbent Cycle       5 Weights       6 UBE      7 Rower 12/2   w49 Mph2.4 15:00   8           Aerobike     12/2                                   w38-71           rpm 24-31   15:00  Other Core Components/Risk Factor Assessment:    Blood Pressure Management:  Hx of Hypertension: no  Resting BP: 136/84  POST BP: 131/78      General Comments:        Rehab Staff Signature: Juhi Andres, RRT

## 2024-03-04 ENCOUNTER — CLINICAL SUPPORT (OUTPATIENT)
Dept: CARDIAC REHAB | Facility: HOSPITAL | Age: 74
End: 2024-03-04
Payer: MEDICARE

## 2024-03-04 DIAGNOSIS — J84.9 INTERSTITIAL LUNG DISEASE (MULTI): Primary | ICD-10-CM

## 2024-03-04 PROCEDURE — 94626 PHY/QHP OP PULM RHB W/MNTR: CPT | Performed by: PEDIATRICS

## 2024-03-04 NOTE — PROGRESS NOTES
Pulmonary Daily Rehabilitation Assessment    Name: Justine Mesa  Medical Record Number: [id number]  YOB: 1950    Today’s Date: 3/4/2024  Primary Care Physician: Odell Sharma MD    Session #: 36    Oxygen Assessment    SP02 rest: 99 %  SP02 exertion: 97 %    Oxygen Use  Requires supplemental O2:    Liters at Rest: ra   Liters with Exertion: ra  Using O2 as prescribed:       Weight Management    Height: 5'3  Weight: 213lbs  BMI:        Daily Activity Log   Modality METS Load Duration   1 Pre-Exercise   6/0      2 Treadmill    12/3 2.5 1.8 inc 0.5 16:00   3 Nustep 4000   12/3 3.4-3.5 6.0 20:00   4 Recumbent Cycle   11/2scifit 10-11W 2.5 21;00   5 Weights       6 UBE      7 Rower      8 Aero Bike      9 Cardio-fit      10 Post-Exercise  6/0            Other Core Components/Risk Factor Assessment:    Blood Pressure Management:  Hx of Hypertension: NO  Resting BP: 140/79  POST BP: 127/79      General Comments:  eDUCATION: bREATHING RETRAINING       Rehab Staff Signature: Juhi Andres, RRT

## 2024-03-05 ENCOUNTER — OFFICE VISIT (OUTPATIENT)
Dept: OTOLARYNGOLOGY | Facility: CLINIC | Age: 74
End: 2024-03-05
Payer: MEDICARE

## 2024-03-05 DIAGNOSIS — M25.562 CHRONIC PAIN OF BOTH KNEES: ICD-10-CM

## 2024-03-05 DIAGNOSIS — J30.9 ALLERGIC RHINITIS, UNSPECIFIED SEASONALITY, UNSPECIFIED TRIGGER: Primary | ICD-10-CM

## 2024-03-05 DIAGNOSIS — M25.561 CHRONIC PAIN OF BOTH KNEES: ICD-10-CM

## 2024-03-05 DIAGNOSIS — G89.29 CHRONIC PAIN OF BOTH KNEES: ICD-10-CM

## 2024-03-05 PROCEDURE — 1036F TOBACCO NON-USER: CPT | Performed by: OTOLARYNGOLOGY

## 2024-03-05 PROCEDURE — 1159F MED LIST DOCD IN RCRD: CPT | Performed by: OTOLARYNGOLOGY

## 2024-03-05 PROCEDURE — 99213 OFFICE O/P EST LOW 20 MIN: CPT | Performed by: OTOLARYNGOLOGY

## 2024-03-05 PROCEDURE — 1126F AMNT PAIN NOTED NONE PRSNT: CPT | Performed by: OTOLARYNGOLOGY

## 2024-03-05 RX ORDER — MONTELUKAST SODIUM 10 MG/1
10 TABLET ORAL NIGHTLY
Qty: 90 TABLET | Refills: 3 | Status: SHIPPED | OUTPATIENT
Start: 2024-03-05 | End: 2024-05-14 | Stop reason: ALTCHOICE

## 2024-03-05 RX ORDER — FLUTICASONE PROPIONATE 50 MCG
2 SPRAY, SUSPENSION (ML) NASAL DAILY
Qty: 48 G | Refills: 3 | Status: SHIPPED | OUTPATIENT
Start: 2024-03-05 | End: 2024-06-03

## 2024-03-05 RX ORDER — CETIRIZINE HYDROCHLORIDE 10 MG/1
10 TABLET ORAL DAILY
Qty: 90 TABLET | Refills: 3 | Status: SHIPPED | OUTPATIENT
Start: 2024-03-05 | End: 2025-02-28

## 2024-03-05 NOTE — PROGRESS NOTES
Progress Notes  Yaron Ragsdale MD (Physician)  Otolaryngology    Chief Complaint     follow up nose sinus problems        History of Present Illness    03.05.2024: She has allergies. Comes for annual follow up.  Nasal crusting (+)    Recommendation:  1- continue medications  2- follow up in one year  3- coconut oil daily  _________________________________________________________________    02.03.2023: She comes for annual follow up.  History of nasal stuffiness, headache and allergic rhinitis for years.  No nose sinus problems, using allergy medications.  On examination, TMs look intact  Mild crusting at left nasal cavity.  No postnasal discharge.     Allergy meds renewed.     Recommendation:  1- one year follow up  ____________________________________________________________________________________________        Justine is a 68 yo F. She has sneezing, watery eyes, and headaches for years. Symptoms get worse with the start of spring. She takes mucinex,cetirizine and fluticasone nasal spray.  She has difficulty with breathing from her nose.     Her headaches got constant for the last week.   Her symptoms get worse when she goes outside and then comes back home.  Blood allergy test is negative.  ____________________________________________________________________________________________        06.15.2020: Patient was recommended to use fluticasone nasal spray, cetirizine tab, montelukast tab, and advil cold sinus if needed. She feels good. Occasional headaches.  ____________________________________________________________________________________________        02.22.2021: She comes for follow up. She is doing good. Headaches once in a while. She is satisfied with her current allergy medications (fluticasone nasal spray, cetirizine tab, montelukast tab, and advil cold sinus if needed).      On examination, there is crusting in nasal cavities more on the left side.   Recommendations:  1- continue allergy medications  2-  use mupirocin ointment for nasal crusting, once a day  ____________________________________________________________________________________________        02.01.2022: She has been using navage. No nose sinus problems.   Allergy medications renewed.  I recommend to use coconut oil for nasal crusting.     Plan:1- follow up in one year      Review of Systems     Constitutional: no fever.      Physical Exam  General appearance: Healthy-appearing, well-nourished, well groomed, in no acute distress.      Head and Face: Atraumatic with no masses, lesions, or scarring.      Salivary glands: No tenderness of the parotid glands or parotid masses. (old exam)     No tenderness of the submandibular glands or submandibular masses. (old exam)     Facial strength: Normal strength and symmetry, no synkinesis or facial tic.      Eyes: Conjunctivas look non-hyperemic bilaterally     Ears: Bilaterally ear canals look normal. Tympanic membranes look intact, no hyperemia, fluid or retraction. Hearing grossly normal. (old exam)     Nose: Septum deviated to left, crusting (+) at left. CLeaning was done.      Oral Cavity/Mouth: Lips and tongue look normal. (old exam)     Throat: No postnasal discharge. No tonsil hypertrophy. No hyperemia. (old exam)     Neck: Symmetrical, trachea midline. (old exam)     Pulmonary: Normal respiratory effort.      Lymphatic No palpable pathologic lymph nodes at neck. (old exam)     Neurological/Psychiatric Orientation to person, place, and time: Normal.   Mood and affect: Normal.      Extremities: No clubbing.      Skin: No skin lesions were noted at face or neck        NASAL ENDOSCOPY: (old exam)  0 degree nasal endoscope was advanced through patient's nasal cavities. On examination patient's septum was deviated to left. No purulent secretions were observed. No polyps or masses. Middle meatus looked open on both sides. No mass or ulceration was found at nasopharynx.      Procedure  NASAL ENDOSCOPY: (old  exam)  0 degree nasal endoscope was advanced through patient's nasal cavities. On examination patient's septum was deviated to left posteriorly touching left inferior turbinate. Inferior turbinate mucosa looks pale. No purulent secretions were observed. No polyps on both sides.            Patient Discussion/Summary     03.05.2024: She has allergies. Comes for annual follow up.  Nasal crusting (+)    Recommendation:  1- continue medications  2- follow up in one year  3- coconut oil daily  _________________________________________________________________    02.03.2023: She comes for annual follow up.  History of nasal stuffiness, headache and allergic rhinitis for years.  No nose sinus problems, using allergy medications.  On examination, TMs look intact  Mild crusting at left nasal cavity.  No postnasal discharge.     Allergy meds renewed.     Recommendation:  1- one year follow up  ____________________________________________________________________________________________     Patient has a history of nasal stuffiness, headache and allergic rhinitis for years. She is stable and doing good on medical therapy.   She has deviated nasal septum to left.  Blood allergy test is negative.     Plan:  1- continue montelukast tab 10 mg once a day  2- continue fluticasone spray, twice a day, 2 puffs on both side  3- continue cetirizine tab  4- continue nasal rinse, try advil cold sinus for headache if needed  5- follow up in 3 months, and then in 6 months.      My impression mucosal contact points and trapped secretions trigger her headaches. She can benefit from septoplasty, turbinoplasty. Alternatively, RF can be reconsidered.     02.22.2021: She comes for follow up. She is doing good. Headaches once in a while. She is satisfied with her current allergy medications (fluticasone nasal spray, cetirizine tab, montelukast tab, and advil cold sinus if needed).      On examination, there is crusting in nasal cavities more on the  left side.   Recommendations:  1- continue allergy medications  2- use mupirocin ointment for nasal crusting, once a day  3- follow up in 9 months     02.01.2022: She has been using navage. No nose sinus problems.   Allergy medications renewed.  I recommend to use coconut oil for nasal crusting.     Plan:1- follow up in one year

## 2024-03-06 ENCOUNTER — CLINICAL SUPPORT (OUTPATIENT)
Dept: CARDIAC REHAB | Facility: HOSPITAL | Age: 74
End: 2024-03-06
Payer: MEDICARE

## 2024-03-06 DIAGNOSIS — J84.9 INTERSTITIAL LUNG DISEASE (MULTI): Primary | ICD-10-CM

## 2024-03-06 PROCEDURE — 94626 PHY/QHP OP PULM RHB W/MNTR: CPT | Performed by: PEDIATRICS

## 2024-03-06 RX ORDER — DICLOFENAC SODIUM 0.01 G/G
GEL TOPICAL
Qty: 100 G | Refills: 0 | Status: SHIPPED | OUTPATIENT
Start: 2024-03-06

## 2024-03-06 NOTE — PROGRESS NOTES
Pulmonary Daily Rehabilitation Assessment    Name: Justine Mesa  Medical Record Number: [id number]  YOB: 1950    Today’s Date: 3/6/2024  Primary Care Physician: Odell Sharma MD    Session #: 36    Oxygen Assessment    SP02 rest: 98 %  SP02 exertion: 97 %    Oxygen Use  Requires supplemental O2:    Liters at Rest: ra   Liters with Exertion: ra  Using O2 as prescribed: na      Weight Management    Height: 5'3  Weight: 213lbs  BMI:        Daily Activity Log   Modality METS Load Duration   1 Pre-Exercise  6/0      2 Treadmill       3 Nustep 4000       4 Recumbent Cycle       5 Weights       6 UBE  9/2 W 5-10 Rpm 65 20:00   7 Rower  13/3 w30 Mph2.6 20:00   8 Aero Bike   13/3  W43-77 Rpm 36-38 20:00   9 Cardio-fit      10 Post-Exercise  6/0            Other Core Components/Risk Factor Assessment:    Blood Pressure Management:  Hx of Hypertension: no  Resting BP: 129/83  POST BP: 107/71      General Comments:        Rehab Staff Signature: Juhi Andres, RRT

## 2024-03-11 ENCOUNTER — APPOINTMENT (OUTPATIENT)
Dept: CARDIAC REHAB | Facility: HOSPITAL | Age: 74
End: 2024-03-11
Payer: MEDICARE

## 2024-03-13 ENCOUNTER — APPOINTMENT (OUTPATIENT)
Dept: CARDIAC REHAB | Facility: HOSPITAL | Age: 74
End: 2024-03-13
Payer: MEDICARE

## 2024-03-18 ENCOUNTER — APPOINTMENT (OUTPATIENT)
Dept: CARDIAC REHAB | Facility: HOSPITAL | Age: 74
End: 2024-03-18
Payer: MEDICARE

## 2024-03-20 ENCOUNTER — APPOINTMENT (OUTPATIENT)
Dept: CARDIAC REHAB | Facility: HOSPITAL | Age: 74
End: 2024-03-20
Payer: MEDICARE

## 2024-03-25 ENCOUNTER — APPOINTMENT (OUTPATIENT)
Dept: CARDIAC REHAB | Facility: HOSPITAL | Age: 74
End: 2024-03-25
Payer: MEDICARE

## 2024-03-27 ENCOUNTER — APPOINTMENT (OUTPATIENT)
Dept: CARDIAC REHAB | Facility: HOSPITAL | Age: 74
End: 2024-03-27
Payer: MEDICARE

## 2024-04-01 ENCOUNTER — APPOINTMENT (OUTPATIENT)
Dept: CARDIAC REHAB | Facility: HOSPITAL | Age: 74
End: 2024-04-01
Payer: MEDICARE

## 2024-04-03 ENCOUNTER — APPOINTMENT (OUTPATIENT)
Dept: CARDIAC REHAB | Facility: HOSPITAL | Age: 74
End: 2024-04-03
Payer: MEDICARE

## 2024-04-08 ENCOUNTER — APPOINTMENT (OUTPATIENT)
Dept: CARDIAC REHAB | Facility: HOSPITAL | Age: 74
End: 2024-04-08
Payer: MEDICARE

## 2024-04-10 ENCOUNTER — APPOINTMENT (OUTPATIENT)
Dept: CARDIAC REHAB | Facility: HOSPITAL | Age: 74
End: 2024-04-10
Payer: MEDICARE

## 2024-04-15 ENCOUNTER — APPOINTMENT (OUTPATIENT)
Dept: CARDIAC REHAB | Facility: HOSPITAL | Age: 74
End: 2024-04-15
Payer: MEDICARE

## 2024-04-17 ENCOUNTER — APPOINTMENT (OUTPATIENT)
Dept: CARDIAC REHAB | Facility: HOSPITAL | Age: 74
End: 2024-04-17
Payer: MEDICARE

## 2024-04-22 ENCOUNTER — APPOINTMENT (OUTPATIENT)
Dept: CARDIAC REHAB | Facility: HOSPITAL | Age: 74
End: 2024-04-22
Payer: MEDICARE

## 2024-04-24 ENCOUNTER — APPOINTMENT (OUTPATIENT)
Dept: CARDIAC REHAB | Facility: HOSPITAL | Age: 74
End: 2024-04-24
Payer: MEDICARE

## 2024-04-29 ENCOUNTER — APPOINTMENT (OUTPATIENT)
Dept: CARDIAC REHAB | Facility: HOSPITAL | Age: 74
End: 2024-04-29
Payer: MEDICARE

## 2024-05-01 ENCOUNTER — APPOINTMENT (OUTPATIENT)
Dept: PODIATRY | Facility: CLINIC | Age: 74
End: 2024-05-01
Payer: MEDICARE

## 2024-05-01 ENCOUNTER — APPOINTMENT (OUTPATIENT)
Dept: CARDIAC REHAB | Facility: HOSPITAL | Age: 74
End: 2024-05-01
Payer: MEDICARE

## 2024-05-02 DIAGNOSIS — J45.909 ASTHMATIC BRONCHITIS WITHOUT COMPLICATION, UNSPECIFIED ASTHMA SEVERITY, UNSPECIFIED WHETHER PERSISTENT (HHS-HCC): ICD-10-CM

## 2024-05-06 ENCOUNTER — APPOINTMENT (OUTPATIENT)
Dept: CARDIAC REHAB | Facility: HOSPITAL | Age: 74
End: 2024-05-06
Payer: MEDICARE

## 2024-05-07 ENCOUNTER — TELEPHONE (OUTPATIENT)
Dept: PULMONOLOGY | Facility: HOSPITAL | Age: 74
End: 2024-05-07
Payer: MEDICARE

## 2024-05-07 DIAGNOSIS — J45.909 ASTHMATIC BRONCHITIS WITHOUT COMPLICATION, UNSPECIFIED ASTHMA SEVERITY, UNSPECIFIED WHETHER PERSISTENT (HHS-HCC): ICD-10-CM

## 2024-05-07 RX ORDER — FLUTICASONE FUROATE AND VILANTEROL TRIFENATATE 200; 25 UG/1; UG/1
1 POWDER RESPIRATORY (INHALATION) DAILY
Qty: 60 EACH | Refills: 5 | Status: SHIPPED | OUTPATIENT
Start: 2024-05-07 | End: 2024-05-07 | Stop reason: SDUPTHER

## 2024-05-07 NOTE — TELEPHONE ENCOUNTER
Pt called needing a refill on her Breo inhaler. Refill order placed and routed to Dr. Gregory to sign.

## 2024-05-08 ENCOUNTER — HOSPITAL ENCOUNTER (OUTPATIENT)
Dept: RESPIRATORY THERAPY | Facility: HOSPITAL | Age: 74
End: 2024-05-08
Payer: MEDICARE

## 2024-05-08 ENCOUNTER — APPOINTMENT (OUTPATIENT)
Dept: CARDIAC REHAB | Facility: HOSPITAL | Age: 74
End: 2024-05-08
Payer: MEDICARE

## 2024-05-08 RX ORDER — FLUTICASONE FUROATE AND VILANTEROL TRIFENATATE 200; 25 UG/1; UG/1
1 POWDER RESPIRATORY (INHALATION) DAILY
Qty: 60 EACH | Refills: 3 | Status: SHIPPED | OUTPATIENT
Start: 2024-05-08

## 2024-05-14 ENCOUNTER — OFFICE VISIT (OUTPATIENT)
Dept: PRIMARY CARE | Facility: CLINIC | Age: 74
End: 2024-05-14
Payer: MEDICARE

## 2024-05-14 VITALS
BODY MASS INDEX: 36.16 KG/M2 | RESPIRATION RATE: 18 BRPM | WEIGHT: 211.8 LBS | HEIGHT: 64 IN | DIASTOLIC BLOOD PRESSURE: 86 MMHG | HEART RATE: 56 BPM | OXYGEN SATURATION: 94 % | SYSTOLIC BLOOD PRESSURE: 147 MMHG

## 2024-05-14 DIAGNOSIS — R05.3 CHRONIC COUGH: ICD-10-CM

## 2024-05-14 DIAGNOSIS — I89.0 LYMPHEDEMA: ICD-10-CM

## 2024-05-14 DIAGNOSIS — R06.09 DYSPNEA ON EXERTION: Primary | ICD-10-CM

## 2024-05-14 PROBLEM — M79.671 PAIN IN BOTH FEET: Status: RESOLVED | Noted: 2024-05-14 | Resolved: 2024-05-14

## 2024-05-14 PROBLEM — K21.9 GASTROESOPHAGEAL REFLUX DISEASE: Status: ACTIVE | Noted: 2024-05-14

## 2024-05-14 PROBLEM — K52.9 ACUTE GASTROENTERITIS: Status: RESOLVED | Noted: 2024-05-14 | Resolved: 2024-05-14

## 2024-05-14 PROBLEM — R10.9 LEFT SIDED ABDOMINAL PAIN: Status: RESOLVED | Noted: 2024-05-14 | Resolved: 2024-05-14
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
## 2024-05-14 PROBLEM — M79.672 PAIN IN BOTH FEET: Status: RESOLVED | Noted: 2024-05-14 | Resolved: 2024-05-14

## 2024-05-14 PROBLEM — J18.9 PNEUMONIA: Status: RESOLVED | Noted: 2023-01-24 | Resolved: 2024-05-14

## 2024-05-14 PROBLEM — Z20.822 CONTACT WITH AND (SUSPECTED) EXPOSURE TO COVID-19: Status: RESOLVED | Noted: 2023-01-24 | Resolved: 2024-05-14

## 2024-05-14 PROBLEM — M89.9 DISORDER OF BONE: Status: ACTIVE | Noted: 2024-05-14

## 2024-05-14 PROCEDURE — 1126F AMNT PAIN NOTED NONE PRSNT: CPT

## 2024-05-14 PROCEDURE — 1159F MED LIST DOCD IN RCRD: CPT

## 2024-05-14 PROCEDURE — 99214 OFFICE O/P EST MOD 30 MIN: CPT

## 2024-05-14 PROCEDURE — 1036F TOBACCO NON-USER: CPT

## 2024-05-14 PROCEDURE — 1160F RVW MEDS BY RX/DR IN RCRD: CPT

## 2024-05-14 ASSESSMENT — PATIENT HEALTH QUESTIONNAIRE - PHQ9
1. LITTLE INTEREST OR PLEASURE IN DOING THINGS: NOT AT ALL
SUM OF ALL RESPONSES TO PHQ9 QUESTIONS 1 AND 2: 0
2. FEELING DOWN, DEPRESSED OR HOPELESS: NOT AT ALL

## 2024-05-14 ASSESSMENT — ENCOUNTER SYMPTOMS
DIZZINESS: 0
FEVER: 0
DIARRHEA: 0
SORE THROAT: 0
FATIGUE: 0
SHORTNESS OF BREATH: 1
NAUSEA: 0
HEADACHES: 0
VOMITING: 0
CHILLS: 0
SINUS PRESSURE: 0
COUGH: 1

## 2024-05-14 ASSESSMENT — PAIN SCALES - GENERAL: PAINLEVEL: 0-NO PAIN

## 2024-05-14 NOTE — PROGRESS NOTES
"Subjective   Patient ID: Justine Mesa is a 73 y.o. female who presents for Cough (No productive/) and Leg Swelling (Has lymphedema but legs are more swollen  ).    HPI   73 y.o. female with PMH remarkable for interstitial lung disease, osteopenia, hernia repair, hypothyroidism, ID Anemia here today for dry cough and bilateral leg swelling.      Cough:  - X a few months, constant, not worse in the evening  - Dry cough, no fever, chills  - Has hx of reflux, but states there are no issues now, had hiatal hernia (surgery 2 years ago), does not take reflux medication  - more SOB than usual, worse on exertion  - Sees Dr. Gregory  - Has history of sinus issues and seasonal allergies    Lymphedema -  - Leg swelling worse over the last few months  - Had vascular work done - Dr. Mohseni - 2023  - Wearing compression stockings and knee braces     Review of Systems   Constitutional:  Negative for chills, fatigue and fever.   HENT:  Negative for congestion, ear pain, sinus pressure and sore throat.    Respiratory:  Positive for cough and shortness of breath.         Chest wall pain   Cardiovascular:  Positive for leg swelling. Negative for chest pain.   Gastrointestinal:  Negative for diarrhea, nausea and vomiting.   Neurological:  Negative for dizziness and headaches.   All other systems reviewed and are negative.      Objective   /86   Pulse 56   Resp 18   Ht 1.626 m (5' 4\")   Wt 96.1 kg (211 lb 12.8 oz)   SpO2 94%   BMI 36.36 kg/m²     Physical Exam  Constitutional:       Appearance: Normal appearance.   HENT:      Head: Normocephalic and atraumatic.   Eyes:      Extraocular Movements: Extraocular movements intact.      Conjunctiva/sclera: Conjunctivae normal.   Neck:      Vascular: No carotid bruit.   Cardiovascular:      Rate and Rhythm: Normal rate and regular rhythm.   Pulmonary:      Effort: Pulmonary effort is normal.      Breath sounds: Decreased air movement present. No wheezing, rhonchi or rales.   Chest: "      Chest wall: Tenderness present.   Musculoskeletal:      Cervical back: Normal range of motion and neck supple.      Right lower le+ Edema present.      Left lower le+ Edema present.   Neurological:      General: No focal deficit present.      Mental Status: She is alert and oriented to person, place, and time. Mental status is at baseline.   Psychiatric:         Mood and Affect: Mood normal.         Behavior: Behavior normal.         Thought Content: Thought content normal.         Judgment: Judgment normal.         Assessment/Plan   Problem List Items Addressed This Visit             ICD-10-CM    Cough R05.9    Lymphedema I89.0    Relevant Orders    Referral to Occupational Therapy    Dyspnea on exertion - Primary R06.09    Relevant Orders    Transthoracic Echo (TTE) Complete    Referral to Cardiology     - Recommend patient to have echocardiogram and follow up with cardiology for further evaluation.   - Referral to OT for lymphedema and leg swelling.   - Can consider allergies, sinus infection, or reflux as part of cough symptom. Will follow up if no improvement.   - Patient understands seeking care at ER with worsening symptoms.   - Follow up as scheduled.

## 2024-05-15 ENCOUNTER — PROCEDURE VISIT (OUTPATIENT)
Dept: PODIATRY | Facility: CLINIC | Age: 74
End: 2024-05-15
Payer: MEDICARE

## 2024-05-15 ENCOUNTER — TELEPHONE (OUTPATIENT)
Dept: PRIMARY CARE | Facility: CLINIC | Age: 74
End: 2024-05-15

## 2024-05-15 DIAGNOSIS — B35.1 ONYCHOMYCOSIS OF TOENAIL: ICD-10-CM

## 2024-05-15 DIAGNOSIS — M79.676 PAIN AROUND TOENAIL: Primary | ICD-10-CM

## 2024-05-15 DIAGNOSIS — M79.672 PAIN IN BOTH FEET: ICD-10-CM

## 2024-05-15 DIAGNOSIS — R60.0 EDEMA OF LOWER EXTREMITY: ICD-10-CM

## 2024-05-15 DIAGNOSIS — M79.671 PAIN IN BOTH FEET: ICD-10-CM

## 2024-05-15 PROCEDURE — 11721 DEBRIDE NAIL 6 OR MORE: CPT | Performed by: PODIATRIST

## 2024-05-15 NOTE — PROGRESS NOTES
History of Present Illness:   Patient states they are here for help trimming nails  Denies trauma  Unable to safely trim on own  Has swelling in legs  No other pedal complaints    Past Medical History  Past Medical History:   Diagnosis Date    Acute bronchitis, unspecified     Acute bronchitis, unspecified organism    Acute frontal sinusitis, unspecified 01/06/2020    Acute frontal sinusitis    Acute frontal sinusitis, unspecified 03/07/2018    Acute frontal sinusitis    Acute maxillary sinusitis, unspecified 01/06/2020    Acute maxillary sinusitis    Acute maxillary sinusitis, unspecified 11/09/2017    Acute maxillary antritis    Acute tonsillitis due to other specified organisms     Acute tonsillitis due to other specified organisms    Acute tonsillitis due to other specified organisms 01/31/2017    Acute tonsillitis due to other specified organisms    Acute upper respiratory infection, unspecified 04/18/2017    Viral URI with cough    Acute upper respiratory infection, unspecified 06/07/2017    Acute URI    Chronic frontal sinusitis 03/07/2018    Frontal sinusitis    Diaphragmatic hernia without obstruction or gangrene 06/13/2022    Paraesophageal hiatal hernia    Hypothyroidism, unspecified 01/04/2022    Hypothyroidism    Pain in unspecified lower leg 07/16/2019    Calf pain    Personal history of other diseases of the digestive system 07/11/2022    History of gastroesophageal reflux (GERD)    Personal history of other diseases of the digestive system     History of gastroesophageal reflux (GERD)    Personal history of other diseases of the digestive system     History of hemorrhoids    Personal history of other diseases of the respiratory system 03/07/2018    History of sore throat    Personal history of other diseases of the respiratory system 11/09/2017    History of acute pharyngitis    Personal history of other diseases of the respiratory system 11/21/2017    History of acute bronchitis    Personal  history of other diseases of the respiratory system 03/07/2018    History of acute pharyngitis    Personal history of other specified conditions     History of dizziness    Personal history of urinary calculi     Personal history of renal calculi       Medications and Allergies have been reviewed.    Review Of Systems:  GENERAL: No weight loss, malaise or fevers.  HEENT: Negative for frequent or significant headaches,   RESPIRATORY: Negative for cough, wheezing or shortness of breath.  CARDIOVASCULAR: Negative for chest pain, leg swelling or palpitations.      Examination of Both Lower Extremities:     Vascular exam: Dorsalis pedis and Posterior Tibial pulses palpable 2/4 bilateral. Capillary refill time less than 3 seconds b/l. No Hair growth noted to digits. Mild +1 non pitting edema noted. Skin temp warm to warm from proximal to distal b/l. No varicosities noted.     Neurologic exam: Vibratory, protective and proprioception intact b/l. No neurologic deficits noted.      Musculoskeletal exam: Muscle strength 5/5 for all major muscle groups tested in the lower extremity b/l. Contracted digits and bunion deformity noted b/l. Mid foot spurring noted b/l and decreased longitudinal arch. Pain with palpation noted to left dorsal midfoot. No edema, erythema or ecchymosis noted.      Dermatologic exam: Nails 1-5 b/l discolored and elongated. No SOI noted Webspaces 1-4 b/l are clean, dry and intact. No primary or secondary skin lesions noted. No open lesions or wounds noted at this time .    1. Pain around toenail        2. Edema of lower extremity        3. Onychomycosis of toenail        4. Pain in both feet          Patient exam and eval  Nails debrided  Monitor swelling  Recommend compression socks  No other pedal concerns  Fu prn  Patient was in agreement to this plan. All questions answered.      PCP Nilsa Medrano 5/14/24    Dennise Hays DPM  [phone]  [address]  Fax: [phone]

## 2024-05-24 ENCOUNTER — EVALUATION (OUTPATIENT)
Dept: OCCUPATIONAL THERAPY | Facility: HOSPITAL | Age: 74
End: 2024-05-24
Payer: MEDICARE

## 2024-05-24 DIAGNOSIS — I89.0 LYMPHEDEMA: Primary | ICD-10-CM

## 2024-05-24 DIAGNOSIS — E03.9 HYPOTHYROIDISM, UNSPECIFIED TYPE: ICD-10-CM

## 2024-05-24 PROCEDURE — 97165 OT EVAL LOW COMPLEX 30 MIN: CPT | Mod: GO | Performed by: OCCUPATIONAL THERAPIST

## 2024-05-24 RX ORDER — LEVOTHYROXINE SODIUM 50 UG/1
50 TABLET ORAL DAILY
Qty: 90 TABLET | Refills: 0 | Status: SHIPPED | OUTPATIENT
Start: 2024-05-24

## 2024-05-24 ASSESSMENT — COLUMBIA-SUICIDE SEVERITY RATING SCALE - C-SSRS
2. HAVE YOU ACTUALLY HAD ANY THOUGHTS OF KILLING YOURSELF?: NO
6. HAVE YOU EVER DONE ANYTHING, STARTED TO DO ANYTHING, OR PREPARED TO DO ANYTHING TO END YOUR LIFE?: NO
1. IN THE PAST MONTH, HAVE YOU WISHED YOU WERE DEAD OR WISHED YOU COULD GO TO SLEEP AND NOT WAKE UP?: NO

## 2024-05-24 ASSESSMENT — PAIN - FUNCTIONAL ASSESSMENT: PAIN_FUNCTIONAL_ASSESSMENT: 0-10

## 2024-05-24 ASSESSMENT — PAIN SCALES - GENERAL: PAINLEVEL_OUTOF10: 3

## 2024-05-24 ASSESSMENT — ENCOUNTER SYMPTOMS
OCCASIONAL FEELINGS OF UNSTEADINESS: 0
DEPRESSION: 0
LOSS OF SENSATION IN FEET: 0

## 2024-05-24 NOTE — PROGRESS NOTES
Occupational Therapy  Lymphedema Evaluation    Patient Name: Justine Mesa  MRN: [id number]  Encounter Date: 5/24/2024  Time Calculation  Start Time: 0840  Stop Time: 0940  Time Calculation (min): 60 min  Today's Charges:  OT Evaluation Time Entry  OT Evaluation (Low) Time Entry: 60     Insurance:  Visit number: 1  Certification Period Start Date: 05/24/24  Certification Period End Date: 08/16/24  [place]ization info[place]  Payor: [place]ARE / Plan: MEDICARE PART A AND B / Product Type: *No Product type* /     Current Problem  1. Lymphedema  Referral to Occupational Therapy        Problem List Items Addressed This Visit             ICD-10-CM    Lymphedema I89.0       General  Reason for Referral: BLE leg swelling  Referred By: John    Medical History Form: Reviewed (scanned into chart)    Subjective   Chief Complaint: Patient presents to clinic with BLE edema  Onset Date: 2004    MENDEL fall risk score (The score of 4 or more indicates an increased risk of falling): 2    Current Condition:   Worse    Pain:  Pain Assessment: 0-10  Pain Score: 3  Pain Type: Chronic pain  Pain Location: Foot    Relevant Information (PMH & Previous Tests/Imaging): US neg  Previous Interventions/Treatments: endovenous ablation, wears compression    Lymphedema History:  Lymph Node Status:  intact  Preceding Event: Slow Onset and Surgery  Current Compression: Yes, describe: 15-18mmHg socks            Compression Pump:   No     Does elevation help: Yes but does not resolve    Precautions:   Lymphedema Precautions  General: Lymphedema  Abdominal MLD:  Diverticulitis   Compression: Osteoporosis    Prior Level of Function (PLOF)  Patient previously independent with all ADLs  [facility], plans on opening the pool and exercising in the water in a few weeks  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 No  Fever/chills, unexplained weight changes, dizziness/fainting, unexplained change in bowel or bladder functions, unexplained malaise or muscle weakness, night pain/sweats, numbness or tingling    Objective   ADL's:    UB Bathing: Independent   UB Dressing: Independent   LB Bathing: Independent   LB Dressing: Independent     IADL's: Independent, still working    Balance: Sitting balance: good   Dynamic sitting balance: good   Standing balance: good   Dynamic standing balance: good     Transfers: Modified Independent     Gait: limp    Assistive Device: no device     ROM: RLE:    within normal limits and LLE:   within normal limits    Strength: RLE: 4/5  and LLE: 4/5    Sensation: RLE:    Intact    and LLE:    Intact    Scar(s): No    Lymphedema  Lymphedema Location and Appearance:  RLE: Dryness,  Pitting Edema,  Fungal Infection,  Hyperpigmentation, varicosities present, easily bruises, tissue presentation in thigh adipose appearance, foot spared  LLE:  Dryness,  Pitting Edema,  Fungal Infection,  Hyperpigmentation, varicosities present, easily bruises, tissue presentation in thigh adipose appearance, foot spared     05/24/24 0840   Lymphedema Assessments   Lymphedema Assessments LE   Right Lower Extremity Lymphedema   R Metatarsal (cm) 23.2 cm   R Heel Y Angle 30.5 cm   R Ankle (cm) 30.8 cm   R 10 cm Above Ankle (cm) 34 cm   R 20 cm Above Ankle (cm) 42 cm   R 30 cm Above Ankle (cm) 46.1 cm   R 40 cm Above Ankle (cm) 0 cm   R Knee (cm) 54 cm   R 10 cm Above Knee (cm) 67.1 cm   R 20 cm Above Knee (cm) 73 cm   R 30 cm Above Knee (cm) 0 cm   R 40 cm Above Knee (cm) 0 cm   R 50 cm Above Knee (cm) 0 cm   Right lower extremity total 400.7   Left Lower Extremity Lymphedema   L Metatarsal (cm) 22.7 cm   L Heel Y Angle 31.2 cm   L Ankle (cm) 28.3 cm   L 10 cm Above Ankle  (cm) 33.6 cm   L 20 cm Above Ankle (cm) 42 cm   L 30 cm Above Ankle (cm) 46 cm   L 40 cm Above Ankle (cm) 0 cm   L Knee (cm) 51.7 cm   L 10 cm Above Knee (cm) 65.8 cm   L 20 cm Above Knee (cm) 71.5 cm   L 30 cm Above Knee (cm) 0 cm   L 40 cm Above Knee (cm) 0 cm   L 50 cm Above Knee (cm) 0 cm   Left Lower extremity total 392.8     Outcome Measures:     05/24/24 0840   OT Adult Other Outcome Measures   Lymphedema Life Impact Scale (LLIS) 52.94       EDUCATION:   Individual(s) Educated: patient  Education Provided: Education: Anatomy and Physiology, Diagnosis and Precautions, Lymphedema, and Symptom Management  Risk and Benefits Discussed with Patient/Caregiver/Other: Yes   Patient/Caregiver Demonstrated Understanding: Yes   Plan of Care Discussed and Agreed Upon: Yes   Patient Response to Education: Patient/Caregiver verbalized understanding of information    Problems:  Problems to be addressed: Pain, Gait impaired, Infection risk, Needs garment fitting, Impaired lymph flow, Decreased knowledge of condition/precautions, and Decreased knowledge of home exercise program    Rehab Potential: Good    Assessment: Patient presents with Secondary Phlebolipolymphedema stage 2, resulting in further advanced skin changes from chronic edema not managed by the light compression she wears daily. Pt would benefit from treatment to reduce volume and manage condition to prevent infection, reduce pain, improve mobility, improve independence, and improve self management of condition.    Garment recommendations:   RLE: Pt requires:  --1 gradient pressure wrap with adjustable straps below knee, 30-50mmHg with hybrid liner sock to allow proper garment fit as shape of leg changes with consistent compression use.   --Also requires 2 gradient compression stockings, below knee, custom 30-40mmHg due to shape of limb and ankle contour. Elastic off the shelf garments would cause skin and lymphatic drainage issues. Recommend silicone band to  prevent rolling and maintain garment placement for maximum effectiveness.     LLE: Pt requires:  --1 gradient pressure wrap with adjustable straps below knee, 30-50mmHg with hybrid liner sock to allow proper garment fit as shape of leg changes with consistent compression use.   --Also requires 2 gradient compression stockings, below knee, custom 30-40mmHg due to shape of limb and ankle contour. Elastic off the shelf garments would cause skin and lymphatic drainage issues. Recommend silicone band to prevent rolling and maintain garment placement for maximum effectiveness.     Plan:  [place] of Treatments Authorized: [place]  Planned Interventions include: ADL training, Therapeutic Exercise, Therapeutic Activity, Manual Lymph Drainage, Vasopneumatic pump, Patient and/or Caregiver Training, Self MLD training, HEP, and Garment measuring and fitting  Frequency: 1-2 x Week  Duration: 12 Weeks    Goals: Set and discussed today  Active       Lymphedema       LTG -  Pt will demonstrate independence with donning and doffing of compression garment(s) per recommended wear schedule 100% of the time       Start:  05/27/24    Expected End:  08/16/24            LTG - Patient will have improved LLIS score by 8 points or greater indicating improving lymphedema and functional ability       Start:  05/27/24    Expected End:  08/16/24            LTG - By discharge the patient will be knowledgeable and compliant with home program, including lymphedema management and exercises       Start:  05/27/24    Expected End:  08/16/24            STG - patient will be independent with diaphragmatic breathing exercises       Start:  05/27/24    Expected End:  08/16/24                Plan of care was developed with input and agreement by the patient.    Verification of benefits sent for: Vasopneumatic Pump     Vinny Rodriguez, OT

## 2024-05-28 ENCOUNTER — HOSPITAL ENCOUNTER (OUTPATIENT)
Dept: CARDIOLOGY | Facility: HOSPITAL | Age: 74
Discharge: HOME | End: 2024-05-28
Payer: MEDICARE

## 2024-05-28 DIAGNOSIS — R06.09 DYSPNEA ON EXERTION: ICD-10-CM

## 2024-05-28 LAB
AORTIC VALVE PEAK VELOCITY: 1.38 M/S
AV PEAK GRADIENT: 7.6 MMHG
AVA (PEAK VEL): 2.53 CM2
EJECTION FRACTION APICAL 4 CHAMBER: 61.1
LEFT ATRIUM VOLUME AREA LENGTH INDEX BSA: 24.6 ML/M2
LEFT VENTRICLE INTERNAL DIMENSION DIASTOLE: 4.55 CM (ref 3.5–6)
LEFT VENTRICULAR OUTFLOW TRACT DIAMETER: 2 CM
LV EJECTION FRACTION BIPLANE: 63 %
MITRAL VALVE E/A RATIO: 0.77
MITRAL VALVE E/E' RATIO: 7.84
RIGHT VENTRICLE FREE WALL PEAK S': 20.6 CM/S
RIGHT VENTRICLE PEAK SYSTOLIC PRESSURE: 31.1 MMHG
TRICUSPID ANNULAR PLANE SYSTOLIC EXCURSION: 2 CM

## 2024-05-28 PROCEDURE — 93306 TTE W/DOPPLER COMPLETE: CPT | Performed by: INTERNAL MEDICINE

## 2024-05-28 PROCEDURE — 93306 TTE W/DOPPLER COMPLETE: CPT

## 2024-06-07 ENCOUNTER — APPOINTMENT (OUTPATIENT)
Dept: PULMONOLOGY | Facility: HOSPITAL | Age: 74
End: 2024-06-07
Payer: MEDICARE

## 2024-06-14 ENCOUNTER — TREATMENT (OUTPATIENT)
Dept: OCCUPATIONAL THERAPY | Facility: HOSPITAL | Age: 74
End: 2024-06-14
Payer: MEDICARE

## 2024-06-14 DIAGNOSIS — I89.0 LYMPHEDEMA: ICD-10-CM

## 2024-06-14 PROCEDURE — 97530 THERAPEUTIC ACTIVITIES: CPT | Mod: GO | Performed by: OCCUPATIONAL THERAPIST

## 2024-06-14 PROCEDURE — 97016 VASOPNEUMATIC DEVICE THERAPY: CPT | Mod: GO | Performed by: OCCUPATIONAL THERAPIST

## 2024-06-14 NOTE — PROGRESS NOTES
Occupational Therapy  Lymphedema Treatment    Patient Name: Justine Mesa  MRN: [id number]  Encounter Date: 6/14/2024  Time Calculation  Start Time: 0932  Stop Time: 1020  Time Calculation (min): 48 min  Today's Charges:     OT Therapeutic Procedures Time Entry  Therapeutic Activity Time Entry: 18  OT Modalities Time Entry  Vasopneumatic Devices Time Entry: 30     Insurance:  Visit number: 2   Certification Period Start Date: 05/24/24  Certification Period End Date: 08/16/24  [place] info[place]  Payor: [place]ARE / Plan: MEDICARE PART A AND B / Product Type: *No Product type* /     Current Problem  1. Lymphedema  Follow Up In Occupational Therapy        Problem List Items Addressed This Visit             ICD-10-CM    Lymphedema I89.0        Referred by: Odell Sharma MD    Subjective   Current Condition:   Worse, feels like the fluid is in th abdomen and arms, started on a water pill 2 days ago    Precautions:   Lymphedema Precautions  General: Lymphedema  Abdominal MLD:  Diverticulitis              Compression: Osteoporosis    Patient's Goal(s) for Therapy: manage the swelling and the pain     Objective   Patient performed pump trial with vendor on RLE and LLE at 45 mmHg x 30 min. Patient educated on donning and doffing of garment, operation of pump device, recommended schedule, use of pump without garments on, proper cleaning of pump sleeves. Patient tolerated well and demonstrated change in tissue motility.     EDUCATION:   Individual(s) Educated: patient  Education Provided: Education: Other pump use  Patient/Caregiver Demonstrated Understanding: Yes   Plan of Care Discussed and Agreed Upon: Yes   Patient Response to Education: Patient/Caregiver verbalized understanding of information    Assessment: Continue with POC. On hold until July 8 d/t waiting on garments and pump approval     Plan:  Frequency: 1-2 x Week  Duration: 12 Weeks    Goals:  Active       Lymphedema       LTG -  Pt will demonstrate  independence with donning and doffing of compression garment(s) per recommended wear schedule 100% of the time       Start:  05/27/24    Expected End:  08/16/24            LTG - Patient will have improved LLIS score by 8 points or greater indicating improving lymphedema and functional ability       Start:  05/27/24    Expected End:  08/16/24            LTG - By discharge the patient will be knowledgeable and compliant with home program, including lymphedema management and exercises       Start:  05/27/24    Expected End:  08/16/24            STG - patient will be independent with diaphragmatic breathing exercises       Start:  05/27/24    Expected End:  08/16/24                Vinny Rodriguez, OT

## 2024-06-20 ENCOUNTER — APPOINTMENT (OUTPATIENT)
Dept: RESPIRATORY THERAPY | Facility: HOSPITAL | Age: 74
End: 2024-06-20
Payer: MEDICARE

## 2024-06-24 ENCOUNTER — HOSPITAL ENCOUNTER (OUTPATIENT)
Dept: RESPIRATORY THERAPY | Facility: HOSPITAL | Age: 74
Discharge: HOME | End: 2024-06-24
Payer: MEDICARE

## 2024-06-24 DIAGNOSIS — J84.9 ILD (INTERSTITIAL LUNG DISEASE) (MULTI): ICD-10-CM

## 2024-06-24 LAB
MGC ASCENT PFT - FEV1 - POST: 1.89
MGC ASCENT PFT - FEV1 - POST: 1.89
MGC ASCENT PFT - FEV1 - PRE: 1.93
MGC ASCENT PFT - FEV1 - PRE: 1.93
MGC ASCENT PFT - FEV1 - PREDICTED: 2.09
MGC ASCENT PFT - FEV1 - PREDICTED: 2.09
MGC ASCENT PFT - FVC - POST: 2.53
MGC ASCENT PFT - FVC - POST: 2.53
MGC ASCENT PFT - FVC - PRE: 2.63
MGC ASCENT PFT - FVC - PRE: 2.63
MGC ASCENT PFT - FVC - PREDICTED: 2.69
MGC ASCENT PFT - FVC - PREDICTED: 2.69

## 2024-06-24 PROCEDURE — 94618 PULMONARY STRESS TESTING: CPT

## 2024-06-24 PROCEDURE — 94618 PULMONARY STRESS TESTING: CPT | Performed by: PEDIATRICS

## 2024-06-24 PROCEDURE — 94060 EVALUATION OF WHEEZING: CPT

## 2024-06-24 PROCEDURE — 94664 DEMO&/EVAL PT USE INHALER: CPT

## 2024-06-24 PROCEDURE — 94060 EVALUATION OF WHEEZING: CPT | Performed by: PEDIATRICS

## 2024-06-24 PROCEDURE — 94729 DIFFUSING CAPACITY: CPT | Performed by: PEDIATRICS

## 2024-06-24 PROCEDURE — 98960 EDU&TRN PT SELF-MGMT NQHP 1: CPT | Mod: MUE

## 2024-06-24 PROCEDURE — 94729 DIFFUSING CAPACITY: CPT

## 2024-06-26 ENCOUNTER — APPOINTMENT (OUTPATIENT)
Dept: PRIMARY CARE | Facility: CLINIC | Age: 74
End: 2024-06-26
Payer: MEDICARE

## 2024-07-02 ENCOUNTER — APPOINTMENT (OUTPATIENT)
Dept: PRIMARY CARE | Facility: CLINIC | Age: 74
End: 2024-07-02
Payer: MEDICARE

## 2024-07-09 ENCOUNTER — APPOINTMENT (OUTPATIENT)
Dept: OCCUPATIONAL THERAPY | Facility: HOSPITAL | Age: 74
End: 2024-07-09
Payer: MEDICARE

## 2024-07-10 NOTE — PROGRESS NOTES
"Patient ID:   Justine Mesa is a 73 y.o. female with PMH remarkable for interstitial lung disease, osteopenia, hernia repair, hypothyroidism, ID Anemia who presents to the office today for Follow-up (2 mth ).    HEALTH MAINTENANCE: FOLLOW UP   Last Office Visit: 5/14/2024 with my colleague for non productive cough and BLE edema. An Echo was ordered and referral placed for OT for lymphedema and cardiology for FRIAS. She has an appt next on 7/16/2024 for OT.   Mammogram (40-75): 7/11/2023 -ve  Pap smear (21-65, or hysterectomy q5yrs): h/o hysterectomy  Last Labs: 5/4/2023  Colonoscopy (45-75 or age 40 with 1st degree relative dx colon ca): 9/25/2020 with Dr White which showed diverticulosis.  Lung cancer screening (50-78 y/o + 20 pack year + smoking/quit in last 15 years): 10/4/2023  DEXA (65+, q 2 years): 2/28/2023 showing osteopenia of multiple areas.   Echo 5/28/2024 showed LVSF 60-65%, impaired relaxation.  - has compression stockings coming soon. Going to OT at [place] for lymphedema.     Social History     Tobacco Use    Smoking status: Never     Passive exposure: Never    Smokeless tobacco: Never   Substance Use Topics    Alcohol use: Yes     Comment: occasional    Drug use: Never       Review of Systems   All other systems reviewed and are negative.      Visit Vitals  /79   Pulse 56   Resp 18   Ht 1.626 m (5' 4\")   Wt 96.9 kg (213 lb 9.6 oz)   SpO2 94%   BMI 36.66 kg/m²   Smoking Status Never   BSA 2.09 m²       Physical Exam  Vitals reviewed.   Constitutional:       General: She is not in acute distress.     Appearance: Normal appearance. She is not ill-appearing.   HENT:      Head: Normocephalic and atraumatic.      Right Ear: Tympanic membrane and external ear normal.      Left Ear: Tympanic membrane and external ear normal.      Nose: Nose normal.      Mouth/Throat:      Mouth: Mucous membranes are moist.      Pharynx: Oropharynx is clear.   Eyes:      Conjunctiva/sclera: Conjunctivae normal.      " Pupils: Pupils are equal, round, and reactive to light.   Cardiovascular:      Rate and Rhythm: Normal rate and regular rhythm.      Heart sounds: Normal heart sounds. No murmur heard.  Pulmonary:      Effort: Pulmonary effort is normal. No respiratory distress.      Breath sounds: Normal breath sounds. No wheezing.   Abdominal:      General: There is no distension.      Palpations: Abdomen is soft. There is no mass.      Tenderness: There is no abdominal tenderness.   Musculoskeletal:         General: Normal range of motion.      Cervical back: Normal range of motion and neck supple.      Right lower leg: Edema present.      Left lower leg: Edema present.   Skin:     General: Skin is warm and dry.   Neurological:      General: No focal deficit present.      Mental Status: She is alert and oriented to person, place, and time.      Sensory: No sensory deficit.      Motor: No weakness.      Coordination: Coordination normal.      Gait: Gait normal.   Psychiatric:         Mood and Affect: Mood normal.         Behavior: Behavior normal.         Current Outpatient Medications   Medication Instructions    albuterol 90 mcg/actuation inhaler inhalation, INHALE 1 TO 2 PUFFS EVERY 4 TO 6 HOURS AS NEEDED.    ascorbic acid (Vitamin C) 1,000 mg tablet 1 tablet, oral, Daily    Aspercreme Arthritis Pain 1 % gel APPLY TOPICALLY TO AFFECTED AREA 4 TIMES DAILY AS NEEDED FOR KNEE PAIN    Breo Ellipta 200-25 mcg/dose inhaler 1 puff, inhalation, Daily    calcium citrate-vitamin D3 (Citracal+D) 315 mg-5 mcg (200 unit) tablet 1 tablet, oral, Daily    cetirizine (ZYRTEC) 10 mg, oral, Daily    cranberry 500 mg, oral    cyanocobalamin (Vitamin B-12) 500 mcg tablet oral, Every 24 hours    fluticasone (Flonase) 50 mcg/actuation nasal spray 2 sprays, Each Nostril, Daily    guaiFENesin (MUCINEX) 1,200 mg, oral, Daily    levothyroxine (SYNTHROID, LEVOXYL) 50 mcg, oral, Daily    oxybutynin XL (DITROPAN-XL) 10 mg, oral, Daily        Lab Results    Component Value Date    WBC 9.1 05/04/2023    HGB 14.6 05/04/2023    HCT 46.2 (H) 05/04/2023     05/04/2023    CHOL 232 (H) 05/04/2023    TRIG 169 (H) 05/04/2023    HDL 53.2 05/04/2023    ALT 7 01/24/2023    AST 13 01/24/2023     01/24/2023    K 4.0 01/24/2023     01/24/2023    CREATININE 0.68 01/24/2023    BUN 13 01/24/2023    CO2 32 01/24/2023    TSH 1.22 05/04/2023    INR 1.1 01/24/2023    HGBA1C 5.4 09/24/2020       Problem List Items Addressed This Visit             ICD-10-CM    Asthmatic bronchitis ([facility]) J45.909     - follows with pulm  - reviewed last ct of chest  - has a h/o ILD  - c/w inhalers         Hypothyroidism E03.9     - c/w synthroid         Relevant Orders    TSH    T4, free    Chronic pain of both knees M25.561, M25.562, G89.29    Relevant Medications    diclofenac sodium (Aspercreme Arthritis Pain) 1 % gel    Health care maintenance Z00.00    Relevant Orders    CBC and Auto Differential    Comprehensive Metabolic Panel    Lipid Panel    Vitamin D 25-Hydroxy,Total (for eval of Vitamin D levels)    Vitamin B12    TSH    T4, free     Other Visit Diagnoses         Codes    Lipid screening     Z13.220    Relevant Orders    Lipid Panel    Abnormal finding of blood chemistry, unspecified     R79.9    Relevant Orders    CBC and Auto Differential    Body mass index (BMI) 36.0-36.9, adult     Z68.36    Relevant Orders    Vitamin D 25-Hydroxy,Total (for eval of Vitamin D levels)            --------------------  Written by Cee Gillette RN, acting as a scribe for Dr. Lopez. This note accurately reflects the work and decisions made by Dr. Lopez.     I, Dr. Lopez, attest all medical record entries made by the scribe were under my direction and were personally dictated by me. I have reviewed the chart and agree that the record accurately reflects my performance of the history, physical exam, and assessment and plan.
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
## 2024-07-11 PROBLEM — M79.644 PAIN OF RIGHT THUMB: Status: RESOLVED | Noted: 2023-02-02 | Resolved: 2024-07-11

## 2024-07-11 PROBLEM — N39.0 UTI (URINARY TRACT INFECTION): Status: RESOLVED | Noted: 2023-02-02 | Resolved: 2024-07-11

## 2024-07-11 PROBLEM — R51.9 HEADACHE: Status: RESOLVED | Noted: 2023-02-02 | Resolved: 2024-07-11

## 2024-07-11 PROBLEM — R06.02 SOB (SHORTNESS OF BREATH): Status: RESOLVED | Noted: 2023-02-02 | Resolved: 2024-07-11

## 2024-07-11 PROBLEM — S86.912A STRAIN OF LEFT KNEE: Status: RESOLVED | Noted: 2023-02-02 | Resolved: 2024-07-11

## 2024-07-11 PROBLEM — E66.812 CLASS 2 OBESITY WITH BODY MASS INDEX (BMI) OF 35.0 TO 35.9 IN ADULT: Status: RESOLVED | Noted: 2023-02-02 | Resolved: 2024-07-11

## 2024-07-11 PROBLEM — R20.0 HAND NUMBNESS: Status: RESOLVED | Noted: 2023-02-02 | Resolved: 2024-07-11

## 2024-07-11 PROBLEM — M79.18 MUSCULOSKELETAL PAIN: Status: RESOLVED | Noted: 2023-02-02 | Resolved: 2024-07-11

## 2024-07-11 PROBLEM — Z86.59 HISTORY OF DEPRESSION: Status: RESOLVED | Noted: 2023-02-02 | Resolved: 2024-07-11

## 2024-07-11 PROBLEM — M79.676 PAIN OF TOE: Status: RESOLVED | Noted: 2023-02-02 | Resolved: 2024-07-11

## 2024-07-11 PROBLEM — S46.919A SHOULDER STRAIN: Status: RESOLVED | Noted: 2023-02-02 | Resolved: 2024-07-11

## 2024-07-11 PROBLEM — M79.676 PAIN AROUND TOENAIL: Status: RESOLVED | Noted: 2023-02-02 | Resolved: 2024-07-11

## 2024-07-11 PROBLEM — U07.1 COVID-19: Status: RESOLVED | Noted: 2023-02-02 | Resolved: 2024-07-11

## 2024-07-11 PROBLEM — E66.9 CLASS 2 OBESITY WITH BODY MASS INDEX (BMI) OF 35.0 TO 35.9 IN ADULT: Status: RESOLVED | Noted: 2023-02-02 | Resolved: 2024-07-11

## 2024-07-12 ENCOUNTER — APPOINTMENT (OUTPATIENT)
Dept: PRIMARY CARE | Facility: CLINIC | Age: 74
End: 2024-07-12
Payer: MEDICARE

## 2024-07-12 VITALS
HEIGHT: 64 IN | OXYGEN SATURATION: 94 % | WEIGHT: 213.6 LBS | BODY MASS INDEX: 36.47 KG/M2 | HEART RATE: 56 BPM | SYSTOLIC BLOOD PRESSURE: 119 MMHG | DIASTOLIC BLOOD PRESSURE: 79 MMHG | RESPIRATION RATE: 18 BRPM

## 2024-07-12 DIAGNOSIS — M25.561 CHRONIC PAIN OF BOTH KNEES: ICD-10-CM

## 2024-07-12 DIAGNOSIS — G89.29 CHRONIC PAIN OF BOTH KNEES: ICD-10-CM

## 2024-07-12 DIAGNOSIS — J45.909 ASTHMATIC BRONCHITIS WITHOUT COMPLICATION, UNSPECIFIED ASTHMA SEVERITY, UNSPECIFIED WHETHER PERSISTENT (HHS-HCC): ICD-10-CM

## 2024-07-12 DIAGNOSIS — G21.19 DRUG-INDUCED PARKINSON'S DISEASE (MULTI): ICD-10-CM

## 2024-07-12 DIAGNOSIS — M25.562 CHRONIC PAIN OF BOTH KNEES: ICD-10-CM

## 2024-07-12 DIAGNOSIS — R79.9 ABNORMAL FINDING OF BLOOD CHEMISTRY, UNSPECIFIED: ICD-10-CM

## 2024-07-12 DIAGNOSIS — Z13.220 LIPID SCREENING: ICD-10-CM

## 2024-07-12 DIAGNOSIS — Z00.00 HEALTH CARE MAINTENANCE: ICD-10-CM

## 2024-07-12 DIAGNOSIS — E03.9 HYPOTHYROIDISM, UNSPECIFIED TYPE: ICD-10-CM

## 2024-07-12 DIAGNOSIS — E66.01 OBESITY, MORBID (MULTI): ICD-10-CM

## 2024-07-12 PROCEDURE — 1160F RVW MEDS BY RX/DR IN RCRD: CPT | Performed by: INTERNAL MEDICINE

## 2024-07-12 PROCEDURE — 1159F MED LIST DOCD IN RCRD: CPT | Performed by: INTERNAL MEDICINE

## 2024-07-12 PROCEDURE — 99213 OFFICE O/P EST LOW 20 MIN: CPT | Performed by: INTERNAL MEDICINE

## 2024-07-12 PROCEDURE — 3008F BODY MASS INDEX DOCD: CPT | Performed by: INTERNAL MEDICINE

## 2024-07-12 PROCEDURE — 1036F TOBACCO NON-USER: CPT | Performed by: INTERNAL MEDICINE

## 2024-07-12 RX ORDER — DICLOFENAC SODIUM 10 MG/G
GEL TOPICAL
Qty: 200 G | Refills: 1 | Status: SHIPPED | OUTPATIENT
Start: 2024-07-12

## 2024-07-12 ASSESSMENT — PATIENT HEALTH QUESTIONNAIRE - PHQ9: 2. FEELING DOWN, DEPRESSED OR HOPELESS: NOT AT ALL

## 2024-07-12 NOTE — PATIENT INSTRUCTIONS
It was nice to see you in the office today!  As discussed during our visit...     Please have your blood drawn in the next 1-2 weeks.   You need to be FASTING for 12 hours prior to blood draw.  You may have BLACK coffee, BLACK tea and/or water at any time during the fasting time.  We will contact you with the results of your blood work and any necessary adjustments to your plan of care.  If you do not hear from us within 3-5 days of having your blood drawn, please call the [place] office at [phone].     The two closest Outpatient Lab's to this office is:  [facility]  [address] [place] [address]    Hours:   Monday through Friday 7:30am - 4:30pm (Closed 12:30-1pm for lunch)     Or you can go to ...  [facility]  [address]  [place] [address] [phone]    Hours:   Monday through Friday 7:30am - 4:30pm (Closed 12:30-1pm for lunch)   Saturday 8am - 12pm    Website to confirm hours and location OR look up more locations ... [phone]

## 2024-07-15 ENCOUNTER — LAB (OUTPATIENT)
Dept: LAB | Facility: LAB | Age: 74
End: 2024-07-15
Payer: MEDICARE

## 2024-07-15 DIAGNOSIS — Z00.00 HEALTH CARE MAINTENANCE: ICD-10-CM

## 2024-07-15 DIAGNOSIS — Z13.220 LIPID SCREENING: ICD-10-CM

## 2024-07-15 DIAGNOSIS — E03.9 HYPOTHYROIDISM, UNSPECIFIED TYPE: ICD-10-CM

## 2024-07-15 DIAGNOSIS — R79.9 ABNORMAL FINDING OF BLOOD CHEMISTRY, UNSPECIFIED: ICD-10-CM

## 2024-07-15 LAB
25(OH)D3 SERPL-MCNC: 31 NG/ML (ref 30–100)
ALBUMIN SERPL BCP-MCNC: 4 G/DL (ref 3.4–5)
ALP SERPL-CCNC: 60 U/L (ref 33–136)
ALT SERPL W P-5'-P-CCNC: 10 U/L (ref 7–45)
ANION GAP SERPL CALC-SCNC: 13 MMOL/L (ref 10–20)
AST SERPL W P-5'-P-CCNC: 16 U/L (ref 9–39)
BASOPHILS # BLD MANUAL: 0.89 X10*3/UL (ref 0–0.1)
BASOPHILS NFR BLD MANUAL: 10 %
BILIRUB SERPL-MCNC: 0.6 MG/DL (ref 0–1.2)
BLASTS # BLD MANUAL: 0.18 X10*3/UL
BLASTS NFR BLD MANUAL: 2 %
BUN SERPL-MCNC: 20 MG/DL (ref 6–23)
CALCIUM SERPL-MCNC: 9.3 MG/DL (ref 8.6–10.3)
CHLORIDE SERPL-SCNC: 101 MMOL/L (ref 98–107)
CHOLEST SERPL-MCNC: 243 MG/DL (ref 0–199)
CHOLESTEROL/HDL RATIO: 5
CO2 SERPL-SCNC: 29 MMOL/L (ref 21–32)
CREAT SERPL-MCNC: 0.86 MG/DL (ref 0.5–1.05)
EGFRCR SERPLBLD CKD-EPI 2021: 72 ML/MIN/1.73M*2
EOSINOPHIL # BLD MANUAL: 0.27 X10*3/UL (ref 0–0.4)
EOSINOPHIL NFR BLD MANUAL: 3 %
ERYTHROCYTE [DISTWIDTH] IN BLOOD BY AUTOMATED COUNT: 14.4 % (ref 11.5–14.5)
GIANT PLATELETS BLD QL SMEAR: ABNORMAL
GLUCOSE SERPL-MCNC: 93 MG/DL (ref 74–99)
HCT VFR BLD AUTO: 46.3 % (ref 36–46)
HDLC SERPL-MCNC: 48.8 MG/DL
HGB BLD-MCNC: 14.9 G/DL (ref 12–16)
IMM GRANULOCYTES # BLD AUTO: 0.7 X10*3/UL (ref 0–0.5)
IMM GRANULOCYTES NFR BLD AUTO: 7.8 % (ref 0–0.9)
LDLC SERPL CALC-MCNC: 156 MG/DL
LYMPHOCYTES # BLD MANUAL: 1.34 X10*3/UL (ref 0.8–3)
LYMPHOCYTES NFR BLD MANUAL: 15 %
MCH RBC QN AUTO: 30.5 PG (ref 26–34)
MCHC RBC AUTO-ENTMCNC: 32.2 G/DL (ref 32–36)
MCV RBC AUTO: 95 FL (ref 80–100)
METAMYELOCYTES # BLD MANUAL: 0.09 X10*3/UL
METAMYELOCYTES NFR BLD MANUAL: 1 %
MONOCYTES # BLD MANUAL: 0.8 X10*3/UL (ref 0.05–0.8)
MONOCYTES NFR BLD MANUAL: 9 %
NEUTS SEG # BLD MANUAL: 4.9 X10*3/UL (ref 1.6–5)
NEUTS SEG NFR BLD MANUAL: 55 %
NON HDL CHOLESTEROL: 194 MG/DL (ref 0–149)
NRBC BLD-RTO: 0 /100 WBCS (ref 0–0)
PLATELET # BLD AUTO: 189 X10*3/UL (ref 150–450)
POLYCHROMASIA BLD QL SMEAR: ABNORMAL
POTASSIUM SERPL-SCNC: 3.9 MMOL/L (ref 3.5–5.3)
PROMYELOCYTES # BLD MANUAL: 0.45 X10*3/UL
PROMYELOCYTES NFR BLD MANUAL: 5 %
PROT SERPL-MCNC: 6.7 G/DL (ref 6.4–8.2)
RBC # BLD AUTO: 4.89 X10*6/UL (ref 4–5.2)
RBC MORPH BLD: ABNORMAL
SCHISTOCYTES BLD QL SMEAR: ABNORMAL
SODIUM SERPL-SCNC: 139 MMOL/L (ref 136–145)
STOMATOCYTES BLD QL SMEAR: ABNORMAL
T4 FREE SERPL-MCNC: 0.93 NG/DL (ref 0.61–1.12)
TOTAL CELLS COUNTED BLD: 100
TRIGL SERPL-MCNC: 193 MG/DL (ref 0–149)
TSH SERPL-ACNC: 1.62 MIU/L (ref 0.44–3.98)
VIT B12 SERPL-MCNC: >2000 PG/ML (ref 211–911)
VLDL: 39 MG/DL (ref 0–40)
WBC # BLD AUTO: 8.9 X10*3/UL (ref 4.4–11.3)

## 2024-07-15 PROCEDURE — 82306 VITAMIN D 25 HYDROXY: CPT

## 2024-07-15 PROCEDURE — 82607 VITAMIN B-12: CPT

## 2024-07-15 PROCEDURE — 36415 COLL VENOUS BLD VENIPUNCTURE: CPT

## 2024-07-15 PROCEDURE — 85060 BLOOD SMEAR INTERPRETATION: CPT | Performed by: INTERNAL MEDICINE

## 2024-07-16 ENCOUNTER — TREATMENT (OUTPATIENT)
Dept: OCCUPATIONAL THERAPY | Facility: HOSPITAL | Age: 74
End: 2024-07-16
Payer: MEDICARE

## 2024-07-16 DIAGNOSIS — I89.0 LYMPHEDEMA: ICD-10-CM

## 2024-07-16 LAB — PATH REVIEW-CBC DIFFERENTIAL: NORMAL

## 2024-07-16 PROCEDURE — 97535 SELF CARE MNGMENT TRAINING: CPT | Mod: GO | Performed by: OCCUPATIONAL THERAPIST

## 2024-07-16 ASSESSMENT — ACTIVITIES OF DAILY LIVING (ADL): HOME_MANAGEMENT_TIME_ENTRY: 50

## 2024-07-16 NOTE — PROGRESS NOTES
Occupational Therapy  Lymphedema Treatment    Patient Name: Justine Mesa  MRN: [id number]  Encounter Date: 7/16/2024  Time Calculation  Start Time: 0940  Stop Time: 1030  Time Calculation (min): 50 min  Today's Charges:     OT Therapeutic Procedures Time Entry  Self Care/Home Management (ADLs) Time Entry: 50     Insurance:  Visit number: 3  Certification Period Start Date: 05/24/24  Certification Period End Date: 08/16/24  [place]ization info: [place]  Payor: [place]ARE / Plan: MEDICARE PART A AND B / Product Type: *No Product type* /     Current Problem  1. Lymphedema  Follow Up In Occupational Therapy        Problem List Items Addressed This Visit             ICD-10-CM    Lymphedema I89.0          Referred by: Odell Sharma MD     Subjective   Current Condition:   Same, pt received her inelastic garments and brought to session for fitting. Pt has been wearing her sock compression she purchased online.    Pain:   Not rated    Precautions:   Lymphedema Precautions  General: Lymphedema  Abdominal MLD:  Diverticulitis              Compression: Osteoporosis     Patient's Goal(s) for Therapy: manage the swelling and the pain     Objective   Pt fit with calf inelastic compression system with hybrid socks. Educated on how to don, strap tension, care of garments, schedule of wear. Pt stated the garments felt good, legs felt less heavy immediately.    Measured for custom flat knit knee highs this date as well and submitted to DME vendor. See media.     Patient has attempted elevation, exercise and compression use for 4+ weeks without total resolvement of issue. Recommend compression pump to assist with home management for patient. PCD is medically necessary, the lymphedema is chronic and persistent, 4 weeks of attempted elevation, exercise, and compression use.     RLE: Dryness,  Pitting Edema,  Fungal Infection,  Hyperpigmentation, varicosities present, easily bruises, tissue presentation in thigh adipose appearance, foot  spared   LLE:  Dryness,  Pitting Edema,  Fungal Infection,  Hyperpigmentation, varicosities present, easily bruises, tissue presentation in thigh adipose appearance, foot spared     EDUCATION:   Individual(s) Educated: patient  Education Provided: Education: Other as above  Patient/Caregiver Demonstrated Understanding: Yes   Patient Response to Education: Patient/Caregiver verbalized understanding of information and Patient/Caregiver asked appropriate questions    Assessment: Continue with POC.        Plan:  Frequency: 1 x Week, hold until custom garments arrive  Duration: 12 Weeks    Goals:  Active       Lymphedema       LTG -  Pt will demonstrate independence with donning and doffing of compression garment(s) per recommended wear schedule 100% of the time       Start:  05/27/24    Expected End:  08/16/24            LTG - Patient will have improved LLIS score by 8 points or greater indicating improving lymphedema and functional ability       Start:  05/27/24    Expected End:  08/16/24            LTG - By discharge the patient will be knowledgeable and compliant with home program, including lymphedema management and exercises       Start:  05/27/24    Expected End:  08/16/24            STG - patient will be independent with diaphragmatic breathing exercises       Start:  05/27/24    Expected End:  08/16/24                Vinny Rodriguez, OT

## 2024-07-17 DIAGNOSIS — E78.00 HYPERCHOLESTEREMIA: Primary | ICD-10-CM

## 2024-07-17 RX ORDER — ROSUVASTATIN CALCIUM 10 MG/1
10 TABLET, COATED ORAL DAILY
Qty: 100 TABLET | Refills: 0 | Status: SHIPPED | OUTPATIENT
Start: 2024-07-17 | End: 2025-08-21

## 2024-07-22 ENCOUNTER — HOSPITAL ENCOUNTER (OUTPATIENT)
Dept: RADIOLOGY | Facility: CLINIC | Age: 74
Discharge: HOME | End: 2024-07-22
Payer: MEDICARE

## 2024-07-22 DIAGNOSIS — M25.551 HIP PAIN, ACUTE, RIGHT: ICD-10-CM

## 2024-07-22 PROCEDURE — 72100 X-RAY EXAM L-S SPINE 2/3 VWS: CPT

## 2024-07-22 PROCEDURE — 73502 X-RAY EXAM HIP UNI 2-3 VIEWS: CPT | Mod: RT

## 2024-07-22 PROCEDURE — 73502 X-RAY EXAM HIP UNI 2-3 VIEWS: CPT | Mod: RIGHT SIDE | Performed by: RADIOLOGY

## 2024-08-09 ENCOUNTER — OFFICE VISIT (OUTPATIENT)
Dept: PULMONOLOGY | Facility: HOSPITAL | Age: 74
End: 2024-08-09
Payer: MEDICARE

## 2024-08-09 VITALS
WEIGHT: 212 LBS | SYSTOLIC BLOOD PRESSURE: 135 MMHG | HEART RATE: 56 BPM | TEMPERATURE: 97.6 F | OXYGEN SATURATION: 92 % | BODY MASS INDEX: 36.39 KG/M2 | DIASTOLIC BLOOD PRESSURE: 82 MMHG

## 2024-08-09 DIAGNOSIS — G47.33 OSA (OBSTRUCTIVE SLEEP APNEA): ICD-10-CM

## 2024-08-09 DIAGNOSIS — J84.9 ILD (INTERSTITIAL LUNG DISEASE) (MULTI): Primary | ICD-10-CM

## 2024-08-09 PROCEDURE — 99214 OFFICE O/P EST MOD 30 MIN: CPT | Performed by: INTERNAL MEDICINE

## 2024-08-09 PROCEDURE — 1157F ADVNC CARE PLAN IN RCRD: CPT | Performed by: INTERNAL MEDICINE

## 2024-08-09 PROCEDURE — 1125F AMNT PAIN NOTED PAIN PRSNT: CPT | Performed by: INTERNAL MEDICINE

## 2024-08-09 PROCEDURE — 1159F MED LIST DOCD IN RCRD: CPT | Performed by: INTERNAL MEDICINE

## 2024-08-09 RX ORDER — FUROSEMIDE 40 MG/1
TABLET ORAL
COMMUNITY

## 2024-08-09 ASSESSMENT — LIFESTYLE VARIABLES: HOW OFTEN DO YOU HAVE A DRINK CONTAINING ALCOHOL: NEVER

## 2024-08-09 ASSESSMENT — PAIN SCALES - GENERAL: PAINLEVEL: 4
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
## 2024-08-09 NOTE — PATIENT INSTRUCTIONS
Ms Mesa , it was a pleasure seeing you in clinic today. We discussed the following:     -You will continue to use your inhalers.  -You will continue to go to pulmonary rehab  -We will hold off on starting anti-fibrotic for now and watch for progression    Will see you back in clinic in 3 months with repeat breathing test and walking test and HRCT

## 2024-08-13 ENCOUNTER — APPOINTMENT (OUTPATIENT)
Dept: ORTHOPEDIC SURGERY | Facility: CLINIC | Age: 74
End: 2024-08-13
Payer: COMMERCIAL

## 2024-08-13 DIAGNOSIS — M70.61 GREATER TROCHANTERIC BURSITIS OF RIGHT HIP: Primary | ICD-10-CM

## 2024-08-13 PROCEDURE — 1157F ADVNC CARE PLAN IN RCRD: CPT | Performed by: ORTHOPAEDIC SURGERY

## 2024-08-13 PROCEDURE — 1159F MED LIST DOCD IN RCRD: CPT | Performed by: ORTHOPAEDIC SURGERY

## 2024-08-13 PROCEDURE — 1125F AMNT PAIN NOTED PAIN PRSNT: CPT | Performed by: ORTHOPAEDIC SURGERY

## 2024-08-13 PROCEDURE — 20610 DRAIN/INJ JOINT/BURSA W/O US: CPT | Performed by: ORTHOPAEDIC SURGERY

## 2024-08-13 PROCEDURE — 1160F RVW MEDS BY RX/DR IN RCRD: CPT | Performed by: ORTHOPAEDIC SURGERY

## 2024-08-13 PROCEDURE — 1036F TOBACCO NON-USER: CPT | Performed by: ORTHOPAEDIC SURGERY

## 2024-08-13 PROCEDURE — 99204 OFFICE O/P NEW MOD 45 MIN: CPT | Performed by: ORTHOPAEDIC SURGERY

## 2024-08-13 RX ORDER — MELOXICAM 15 MG/1
15 TABLET ORAL DAILY
Qty: 60 TABLET | Refills: 0 | Status: SHIPPED | OUTPATIENT
Start: 2024-08-13 | End: 2024-10-12

## 2024-08-13 RX ORDER — TRIAMCINOLONE ACETONIDE 40 MG/ML
1 INJECTION, SUSPENSION INTRA-ARTICULAR; INTRAMUSCULAR
Status: COMPLETED | OUTPATIENT
Start: 2024-08-13 | End: 2024-08-13

## 2024-08-13 ASSESSMENT — PAIN - FUNCTIONAL ASSESSMENT: PAIN_FUNCTIONAL_ASSESSMENT: 0-10

## 2024-08-13 ASSESSMENT — PAIN SCALES - GENERAL: PAINLEVEL_OUTOF10: 3

## 2024-08-13 NOTE — PROGRESS NOTES
This is a consultation from Dr. Odell Sharma MD for   Chief Complaint   Patient presents with    Right Hip - Pain       This is a 73 y.o. female who presents for evaluation of right hip pain.  Patient states she has had right hip pain for a while, this is a chronic issue going on for several months.  Sharp pain in the lateral aspect of the hip.  It has been exacerbated last few weeks.  She has never had injections or surgery.  She has not taken any anti-inflammatories or done therapy.  No numbness or tingling no fevers no chills no shooting pain down the leg.  She is a pain right over the side of her hip.  It was worse when she goes upstairs or lays on that side.    Physical Exam    There has been no interval change in this patient's past medical, surgical, medications, allergies, family history or social history since the most recent visit to a provider within our department. 14 point review of systems was performed, reviewed, and negative except for pertinent positives documented in the history of present illness.     Constitutional: well developed, well nourished female in no acute distress  Psychiatric: normal mood, appropriate affect  Eyes: sclera anicteric  HENT: normocephalic/atraumatic  CV: regular rate and rhythm   Respiratory: non labored breathing  Integumentary: no rash  Neurological: moves all extremities    Right hip exam: skin normal, no abrasions, wounds, or lacerations.  Tender over greater trochanter. negative log roll, negative nayan's test. flexion to 90 degrees without pain. no pain with flexion abduction and external rotation, no pain with flexion adduction and internal rotation. neurovascularly intact distally        Xrays were ordered by me, they were reviewed and independently interpreted by me today, they show no significant generative changes in the right hip joint, joint space well-maintained    L Inj/Asp: R greater trochanteric bursa on 8/13/2024 9:34 AM  Indications:  pain  Details: 22 G (Spinal needle  ) needle, lateral approach  Medications: 1 mL triamcinolone acetonide 40 mg/mL      Greater Trochanteric bursitis cortisone injection procedure note:  Discussion:  I discussed the conservative treatment options for Greater Trochanteric Bursitis including but not limited to physical therapy, oral NSAIDS, activity and lifestyle modification, and corticosteroid injections. Pt has elected to try a cortisone injection today. I have explained the risk and benefits of an injection including the possibility of joint infection, bleeding, damage to cartilage, allergic reaction. Patient verbalized understanding and gave verbal consent wishes to proceed with a intraarticular cortisone injection for their peribursal area. We discussed the risks and benefits and potential morbidity related to the treatment, and to the prescription medication administered in the injection    Procedure    With the patient's informed verbal consent, the right hip greater trochanteric area was prepped in standard sterile fashion with Chlorhexidine in a sidelying position. The skin was then anesthetized with ethyl chloride spray and cleaned again with Chlorhexidine. The peritrochanteric soft tissue was then injected with a prefilled spinal needle syringe of 40 mg Kenalog +4 ml Lidocaine without complications.  The patient tolerated this well and felt immediate initial relief of symptoms. A bandaid was applied and the patient ambulated out of the clinic on ther own accord without difficulty. Patient should contact the office if any signs of of infection appear: redness, fever, chills, drainage, swelling or warmth to the hip.  Pt understands that the injections can be repeated no sooner than 3 months.    Procedure, treatment alternatives, risks and benefits explained, specific risks discussed. Consent was given by the patient. Immediately prior to procedure a time out was called to verify the correct patient,  "procedure, equipment, support staff and site/side marked as required. Patient was prepped and draped in the usual sterile fashion.             Impression/Plan: This is a 73 y.o. female with right hip greater trochanteric pain syndrome.  I discussed the risks and benefits of the various treatment options with the patient in detail, treatments for greater trochanteric pain syndrome include use of oral anti-inflammatory medications, use of a cane in the opposite hand, physical therapy, activity modification, and cortisone injection.  Will get her set up with physical therapy I will see her back as needed    BMI Readings from Last 1 Encounters:   08/09/24 36.39 kg/m²      Lab Results   Component Value Date    CREATININE 0.86 07/15/2024     Tobacco Use: Low Risk  (8/13/2024)    Patient History     Smoking Tobacco Use: Never     Smokeless Tobacco Use: Never     Passive Exposure: Never      Computed MELD 3.0 unavailable. One or more values for this score either were not found within the given timeframe or did not fit some other criterion.  Computed MELD-Na unavailable. One or more values for this score either were not found within the given timeframe or did not fit some other criterion.       Lab Results   Component Value Date    HGBA1C 5.4 09/24/2020     No results found for: \"STAPHMRSASCR\"  "

## 2024-08-13 NOTE — LETTER
August 13, 2024     Odell Sharma MD  [address]  [facility] [address]    Patient: Justine Mesa   YOB: 1950   Date of Visit: 8/13/2024       Dear Dr. Odell Sharma MD:    Thank you for referring Justine Mesa to me for evaluation. Below are my notes for this consultation.  If you have questions, please do not hesitate to call me. I look forward to following your patient along with you.       Sincerely,     Terry Triana MD      CC: No Recipients  ______________________________________________________________________________________    This is a consultation from Dr. Odell Sharma MD for   Chief Complaint   Patient presents with   • Right Hip - Pain       This is a 73 y.o. female who presents for evaluation of right hip pain.  Patient states she has had right hip pain for a while, this is a chronic issue going on for several months.  Sharp pain in the lateral aspect of the hip.  It has been exacerbated last few weeks.  She has never had injections or surgery.  She has not taken any anti-inflammatories or done therapy.  No numbness or tingling no fevers no chills no shooting pain down the leg.  She is a pain right over the side of her hip.  It was worse when she goes upstairs or lays on that side.    Physical Exam    There has been no interval change in this patient's past medical, surgical, medications, allergies, family history or social history since the most recent visit to a provider within our department. 14 point review of systems was performed, reviewed, and negative except for pertinent positives documented in the history of present illness.     Constitutional: well developed, well nourished female in no acute distress  Psychiatric: normal mood, appropriate affect  Eyes: sclera anicteric  HENT: normocephalic/atraumatic  CV: regular rate and rhythm   Respiratory: non labored breathing  Integumentary: no rash  Neurological: moves all extremities    Right hip exam: skin  normal, no abrasions, wounds, or lacerations.  Tender over greater trochanter. negative log roll, negative nayan's test. flexion to 90 degrees without pain. no pain with flexion abduction and external rotation, no pain with flexion adduction and internal rotation. neurovascularly intact distally        Xrays were ordered by me, they were reviewed and independently interpreted by me today, they show no significant generative changes in the right hip joint, joint space well-maintained    L Inj/Asp: R greater trochanteric bursa on 8/13/2024 9:34 AM  Indications: pain  Details: 22 G (Spinal needle  ) needle, lateral approach  Medications: 1 mL triamcinolone acetonide 40 mg/mL      Greater Trochanteric bursitis cortisone injection procedure note:  Discussion:  I discussed the conservative treatment options for Greater Trochanteric Bursitis including but not limited to physical therapy, oral NSAIDS, activity and lifestyle modification, and corticosteroid injections. Pt has elected to try a cortisone injection today. I have explained the risk and benefits of an injection including the possibility of joint infection, bleeding, damage to cartilage, allergic reaction. Patient verbalized understanding and gave verbal consent wishes to proceed with a intraarticular cortisone injection for their peribursal area. We discussed the risks and benefits and potential morbidity related to the treatment, and to the prescription medication administered in the injection    Procedure    With the patient's informed verbal consent, the right hip greater trochanteric area was prepped in standard sterile fashion with Chlorhexidine in a sidelying position. The skin was then anesthetized with ethyl chloride spray and cleaned again with Chlorhexidine. The peritrochanteric soft tissue was then injected with a prefilled spinal needle syringe of 40 mg Kenalog +4 ml Lidocaine without complications.  The patient tolerated this well and felt  "immediate initial relief of symptoms. A bandaid was applied and the patient ambulated out of the clinic on ther own accord without difficulty. Patient should contact the office if any signs of of infection appear: redness, fever, chills, drainage, swelling or warmth to the hip.  Pt understands that the injections can be repeated no sooner than 3 months.    Procedure, treatment alternatives, risks and benefits explained, specific risks discussed. Consent was given by the patient. Immediately prior to procedure a time out was called to verify the correct patient, procedure, equipment, support staff and site/side marked as required. Patient was prepped and draped in the usual sterile fashion.             Impression/Plan: This is a 73 y.o. female with right hip greater trochanteric pain syndrome.  I discussed the risks and benefits of the various treatment options with the patient in detail, treatments for greater trochanteric pain syndrome include use of oral anti-inflammatory medications, use of a cane in the opposite hand, physical therapy, activity modification, and cortisone injection.  Will get her set up with physical therapy I will see her back as needed    BMI Readings from Last 1 Encounters:   08/09/24 36.39 kg/m²      Lab Results   Component Value Date    CREATININE 0.86 07/15/2024     Tobacco Use: Low Risk  (8/13/2024)    Patient History    • Smoking Tobacco Use: Never    • Smokeless Tobacco Use: Never    • Passive Exposure: Never      Computed MELD 3.0 unavailable. One or more values for this score either were not found within the given timeframe or did not fit some other criterion.  Computed MELD-Na unavailable. One or more values for this score either were not found within the given timeframe or did not fit some other criterion.       Lab Results   Component Value Date    HGBA1C 5.4 09/24/2020     No results found for: \"STAPHMRSASCR\"  "

## 2024-08-19 ENCOUNTER — DOCUMENTATION (OUTPATIENT)
Dept: PHYSICAL THERAPY | Facility: HOSPITAL | Age: 74
End: 2024-08-19
Payer: MEDICARE

## 2024-08-19 NOTE — PROGRESS NOTES
Physical Therapy                 Therapy Communication Note    Patient Name: Justine Mesa  MRN: [id number]  Today's Date: 8/19/2024     Discipline: Physical Therapy    Missed Time: No Showed for today's appointment.

## 2024-08-21 ENCOUNTER — APPOINTMENT (OUTPATIENT)
Dept: PODIATRY | Facility: CLINIC | Age: 74
End: 2024-08-21
Payer: MEDICARE

## 2024-08-21 DIAGNOSIS — B35.1 ONYCHOMYCOSIS OF TOENAIL: ICD-10-CM

## 2024-08-21 DIAGNOSIS — M79.672 PAIN IN BOTH FEET: ICD-10-CM

## 2024-08-21 DIAGNOSIS — R60.0 EDEMA OF LOWER EXTREMITY: ICD-10-CM

## 2024-08-21 DIAGNOSIS — M79.671 PAIN IN BOTH FEET: ICD-10-CM

## 2024-08-21 DIAGNOSIS — M79.676 PAIN AROUND TOENAIL: Primary | ICD-10-CM

## 2024-08-21 PROCEDURE — 1160F RVW MEDS BY RX/DR IN RCRD: CPT | Performed by: PODIATRIST

## 2024-08-21 PROCEDURE — 1157F ADVNC CARE PLAN IN RCRD: CPT | Performed by: PODIATRIST

## 2024-08-21 PROCEDURE — 1159F MED LIST DOCD IN RCRD: CPT | Performed by: PODIATRIST

## 2024-08-21 PROCEDURE — 11721 DEBRIDE NAIL 6 OR MORE: CPT | Performed by: PODIATRIST

## 2024-08-21 PROCEDURE — 1036F TOBACCO NON-USER: CPT | Performed by: PODIATRIST

## 2024-08-21 NOTE — PROGRESS NOTES
History of Present Illness:   Patient states they are here for help trimming nails  Denies trauma  Unable to safely trim on own  Has swelling in legs  No other pedal complaints    Past Medical History  Past Medical History:   Diagnosis Date    Acute bronchitis, unspecified     Acute bronchitis, unspecified organism    Acute frontal sinusitis, unspecified 01/06/2020    Acute frontal sinusitis    Acute frontal sinusitis, unspecified 03/07/2018    Acute frontal sinusitis    Acute maxillary sinusitis, unspecified 01/06/2020    Acute maxillary sinusitis    Acute maxillary sinusitis, unspecified 11/09/2017    Acute maxillary antritis    Acute tonsillitis due to other specified organisms     Acute tonsillitis due to other specified organisms    Acute tonsillitis due to other specified organisms 01/31/2017    Acute tonsillitis due to other specified organisms    Acute upper respiratory infection, unspecified 04/18/2017    Viral URI with cough    Acute upper respiratory infection, unspecified 06/07/2017    Acute URI    Chronic frontal sinusitis 03/07/2018    Frontal sinusitis    Diaphragmatic hernia without obstruction or gangrene 06/13/2022    Paraesophageal hiatal hernia    Hypothyroidism, unspecified 01/04/2022    Hypothyroidism    Pain in unspecified lower leg 07/16/2019    Calf pain    Personal history of other diseases of the digestive system 07/11/2022    History of gastroesophageal reflux (GERD)    Personal history of other diseases of the digestive system     History of gastroesophageal reflux (GERD)    Personal history of other diseases of the digestive system     History of hemorrhoids    Personal history of other diseases of the respiratory system 03/07/2018    History of sore throat    Personal history of other diseases of the respiratory system 11/09/2017    History of acute pharyngitis    Personal history of other diseases of the respiratory system 11/21/2017    History of acute bronchitis    Personal history  of other diseases of the respiratory system 03/07/2018    History of acute pharyngitis    Personal history of other specified conditions     History of dizziness    Personal history of urinary calculi     Personal history of renal calculi       Medications and Allergies have been reviewed.    Review Of Systems:  GENERAL: No weight loss, malaise or fevers.  HEENT: Negative for frequent or significant headaches,   RESPIRATORY: Negative for cough, wheezing or shortness of breath.  CARDIOVASCULAR: Negative for chest pain, leg swelling or palpitations.      Examination of Both Lower Extremities:     Vascular exam: Dorsalis pedis and Posterior Tibial pulses palpable 2/4 bilateral. Capillary refill time less than 3 seconds b/l. No Hair growth noted to digits. Mild +1 non pitting edema noted. Skin temp warm to warm from proximal to distal b/l. No varicosities noted.     Neurologic exam: Vibratory, protective and proprioception intact b/l. No neurologic deficits noted.      Musculoskeletal exam: Muscle strength 5/5 for all major muscle groups tested in the lower extremity b/l. Contracted digits and bunion deformity noted b/l. Mid foot spurring noted b/l and decreased longitudinal arch. Pain with palpation noted to left dorsal midfoot. No edema, erythema or ecchymosis noted.      Dermatologic exam: Nails 1-5 b/l discolored and elongated. No SOI noted Webspaces 1-4 b/l are clean, dry and intact. No primary or secondary skin lesions noted. No open lesions or wounds noted at this time .    1. Pain around toenail        2. Edema of lower extremity        3. Onychomycosis of toenail        4. Pain in both feet          Patient exam and eval  Nails debrided  Monitor swelling  Recommend compression socks  No other pedal concerns  Fu prn  Patient was in agreement to this plan. All questions answered.      PCP Dr Lopez 7.12.2024    Dennise Hays DPM  [phone]  [facility] [address]  Fax: [phone]

## 2024-08-28 ENCOUNTER — TELEPHONE (OUTPATIENT)
Dept: PULMONOLOGY | Facility: CLINIC | Age: 74
End: 2024-08-28
Payer: MEDICARE

## 2024-08-28 NOTE — TELEPHONE ENCOUNTER
Juhi from pulm rehab said that the pt wanted to do rehab again Juhi said that she seen a order for physical therapy and she didn't know if you wanted the patient to do that first before pulmonary rehab. She said if not if you could please place order for pulm rehab Juhi said she will check on it and call pt to schedule once the order is placed    Thank you!

## 2024-09-06 ENCOUNTER — TREATMENT (OUTPATIENT)
Dept: OCCUPATIONAL THERAPY | Facility: HOSPITAL | Age: 74
End: 2024-09-06
Payer: MEDICARE

## 2024-09-06 ENCOUNTER — EVALUATION (OUTPATIENT)
Dept: PHYSICAL THERAPY | Facility: HOSPITAL | Age: 74
End: 2024-09-06
Payer: MEDICARE

## 2024-09-06 DIAGNOSIS — I89.0 LYMPHEDEMA: ICD-10-CM

## 2024-09-06 DIAGNOSIS — M25.551 CHRONIC RIGHT HIP PAIN: Primary | ICD-10-CM

## 2024-09-06 DIAGNOSIS — G89.29 CHRONIC RIGHT HIP PAIN: Primary | ICD-10-CM

## 2024-09-06 DIAGNOSIS — M70.61 GREATER TROCHANTERIC BURSITIS OF RIGHT HIP: ICD-10-CM

## 2024-09-06 PROCEDURE — 97535 SELF CARE MNGMENT TRAINING: CPT | Mod: GO | Performed by: OCCUPATIONAL THERAPIST

## 2024-09-06 PROCEDURE — 97110 THERAPEUTIC EXERCISES: CPT | Mod: GP

## 2024-09-06 PROCEDURE — 97161 PT EVAL LOW COMPLEX 20 MIN: CPT | Mod: GP

## 2024-09-06 ASSESSMENT — COLUMBIA-SUICIDE SEVERITY RATING SCALE - C-SSRS
6. HAVE YOU EVER DONE ANYTHING, STARTED TO DO ANYTHING, OR PREPARED TO DO ANYTHING TO END YOUR LIFE?: NO
1. IN THE PAST MONTH, HAVE YOU WISHED YOU WERE DEAD OR WISHED YOU COULD GO TO SLEEP AND NOT WAKE UP?: NO
2. HAVE YOU ACTUALLY HAD ANY THOUGHTS OF KILLING YOURSELF?: NO

## 2024-09-06 ASSESSMENT — ACTIVITIES OF DAILY LIVING (ADL): HOME_MANAGEMENT_TIME_ENTRY: 30

## 2024-09-06 ASSESSMENT — PATIENT HEALTH QUESTIONNAIRE - PHQ9
2. FEELING DOWN, DEPRESSED OR HOPELESS: NOT AT ALL
1. LITTLE INTEREST OR PLEASURE IN DOING THINGS: NOT AT ALL
SUM OF ALL RESPONSES TO PHQ9 QUESTIONS 1 AND 2: 0

## 2024-09-06 ASSESSMENT — ENCOUNTER SYMPTOMS
OCCASIONAL FEELINGS OF UNSTEADINESS: 0
DEPRESSION: 0
LOSS OF SENSATION IN FEET: 0

## 2024-09-06 ASSESSMENT — PAIN SCALES - GENERAL
PAINLEVEL_OUTOF10: 0 - NO PAIN
PAINLEVEL_OUTOF10: 0 - NO PAIN

## 2024-09-06 NOTE — PROGRESS NOTES
Physical Therapy    Physical Therapy Evaluation and Treatment      Patient Name: Justine Mesa  MRN: [id number]  Today's Date: 9/6/2024    Time Entry:   Time Calculation  Start Time: 0945  Stop Time: 1015  Time Calculation (min): 30 min  PT Evaluation Time Entry  PT Evaluation (Low) Time Entry: 20  PT Therapeutic Procedures Time Entry  Therapeutic Exercise Time Entry: 10     Assessment:  PT Assessment  Assessment Comment: Patient arrives pain free post lateral hip injection. She would like to get some home exercises to preent recurrence.     Plan:  OP PT Plan  PT Plan: Initial Assessment and Treatment only  PT Frequency: One time visit  Certification Period Start Date: 09/13/24  Certification Period End Date: 09/13/24  Plan of Care Agreement: Patient    Current Problem:   1. Chronic right hip pain        2. Greater trochanteric bursitis of right hip  Referral to Physical Therapy          Subjective    General:  General  Reason for Referral: Eval and treat 2/2 chronic R hip bursitis  Referred By: Dr. Triana  Past Medical History Relevant to Rehab: Patient relates 10 year h/o lateral hip pain with recent exacerbation of insidious onset. She was seen in ER and referred to Dr. Triana who diagnosed trochanteric bursitis. She recieved injection with full resolution of symptoms. She is presenting for HEP.    Precautions:  Precautions  STEADI Fall Risk Score (The score of 4 or more indicates an increased risk of falling): 0     Pain:  Pain Assessment  0-10 (Numeric) Pain Score: 0 - No pain  Objective   Cognition:     General Assessments:  Posture Comment: Bilateral lack of knee extension 2/2 severe knee OA.    Range of Motion Comments: R hip abduction 20 degrees, flexion 120, extension 5    Strength Comments: R hip 4+/5 with exception of abduction which is 4-/5.    Palpation Comment: NTTP   Special Tests Comment: Obers negative.    Functional Assessments:  Gait Comment: Narrow based gait 2/2 bilateral genu varum and mild R  hip weakness.   and Balance Comment: <10 seconds bilaterally.    Extremity/Trunk Assessments:     HIP    Functional Rating Scale     Observation  Non antalgic gait     Hip Palpation/Joint Mobility   NTTP     Lumbar AROM  WFL     Hip AROM  See above     Specific Lower Extremity MMT  No focal LE weakness     Gait  Non antalgic gait without devices.     utcome Measures:  LEFS 51      Treatments:  Instructed in and performed there ex program below.     Access Code: [email or web address]  URL: [email or web address]  Date: 09/06/2024  Prepared by: Bertin Casanova    Exercises  - Clamshell  - 1 x daily - 7 x weekly - 3 sets - 10 reps  - Sidelying Hip Abduction  - 1 x daily - 7 x weekly - 3 sets - 10 reps  - ITB Stretch at Wall  - 1 x daily - 7 x weekly - 3 sets - 1 reps - 30 hold  EDUCATION:  Outpatient Education  Education Provided: Anatomy, Body Mechanics, Home Exercise Program (Home ex instructed and issued.)         Screening  Frequency  Date Last Completed   Spiritual and Cultural Beliefs   Screening  each visit or episode of care 9/6/2024   Falls Risk Screening  every ambulatory visit [unfilled]   Pain Screening  annually at primary care visit  8/9/2024   Domestic Violence screening  annually at primary care visit 5/24/2024   Elder Abuse Screening  annually at primary care visit 5/24/2024   Depression Screening  annually in the primary care setting 9/6/2024   Suicide Risk Screening  annually in the primary care setting 9/6/2024   Nutrition and Food Insecurity   Screening  at least annually at primary care visit  1/5/2024   Key Learner  annually in the primary care setting 9/6/2024   Drug Screen  8/13/2024  8:48 AM   Alcohol Screen  8/13/2024  8:48 AM   Advance Directive  1/5/2024        Goals:  Active       PT Problem       The patient will demonstrate full understanding and competent performance of their home exercise program to maintain or potentially further improve their presenting condition.         Start:  09/06/24    Expected End:  09/06/24

## 2024-09-06 NOTE — LETTER
September 6, 2024    Betrin Casanova, PT  [address] [facility]  [facility] [address]    Patient: Justine Mesa   YOB: 1950   Date of Visit: 9/6/2024       Dear Terry Triana MD  [address] [place] [address]    The attached plan of care is being sent to you because your patient’s medical reimbursement requires that you certify the plan of care. Your signature is required to allow uninterrupted insurance coverage.      You may indicate your approval by signing below and faxing this form back to us at Dept Fax: [phone].    Please call Dept: [phone] with any questions or concerns.    Thank you for this referral,        Bertin Casanova PT  [facility]  [address] [facility] [address]  [phone]    Payer: Payor: MEDICARE / Plan: MEDICARE PART A AND B / Product Type: *No Product type* /                                                                         Date:     Dear Bertin Casanova PT,     Re: Ms. Jsutine Mesa, MRN:[id number]    I certify that I have reviewed the attached plan of care and it is medically necessary for Ms. Justine Mesa (1950) who is under my care.          ______________________________________                    _________________  Provider name and credentials                                           Date and time                                                                                           Plan of Care 9/6/24   Effective from: 9/6/2024  Effective to: 9/6/2024    Plan ID: [id number]            Participants as of Finalize on 9/6/2024    Name Type Comments Contact Info    Terry Triana MD Referring Provider  [phone]    Bertin Casanova PT Physical Therapist  [phone]       Last Plan Note     Author: Bertin Casanova PT Status: Incomplete Last edited: 9/6/2024  9:45 AM       Physical Therapy    Physical Therapy Evaluation and Treatment      Patient Name: Justine Mesa  MRN:  [id number]  Today's Date: 9/6/2024    Time Entry:   Time Calculation  Start Time: 0945  Stop Time: 1015  Time Calculation (min): 30 min  PT Evaluation Time Entry  PT Evaluation (Low) Time Entry: 20  PT Therapeutic Procedures Time Entry  Therapeutic Exercise Time Entry: 10     Assessment:  PT Assessment  Assessment Comment: Patient arrives pain free post lateral hip injection. She would like to get some home exercises to preent recurrence.     Plan:  OP PT Plan  PT Plan: Initial Assessment and Treatment only  PT Frequency: One time visit  Certification Period Start Date: 09/13/24  Certification Period End Date: 09/13/24  Plan of Care Agreement: Patient    Current Problem:   1. Chronic right hip pain        2. Greater trochanteric bursitis of right hip  Referral to Physical Therapy          Subjective   General:  General  Reason for Referral: Eval and treat 2/2 chronic R hip bursitis  Referred By: Dr. Triana  Past Medical History Relevant to Rehab: Patient relates 10 year h/o lateral hip pain with recent exacerbation of insidious onset. She was seen in ER and referred to Dr. Triana who diagnosed trochanteric bursitis. She recieved injection with full resolution of symptoms. She is presenting for HEP.    Precautions:  Precautions  STEADI Fall Risk Score (The score of 4 or more indicates an increased risk of falling): 0     Pain:  Pain Assessment  0-10 (Numeric) Pain Score: 0 - No pain  Objective  Cognition:     General Assessments:  Posture Comment: Bilateral lack of knee extension 2/2 severe knee OA.    Range of Motion Comments: R hip abduction 20 degrees, flexion 120, extension 5    Strength Comments: R hip 4+/5 with exception of abduction which is 4-/5.    Palpation Comment: NTTP   Special Tests Comment: Obers negative.    Functional Assessments:  Gait Comment: Narrow based gait 2/2 bilateral genu varum and mild R hip weakness.   and Balance Comment: <10 seconds bilaterally.    Extremity/Trunk Assessments:      HIP    Functional Rating Scale     Observation  Non antalgic gait     Hip Palpation/Joint Mobility   NTTP     Lumbar AROM  WFL     Hip AROM  See above     Specific Lower Extremity MMT  No focal LE weakness     Gait  Non antalgic gait without devices.     utcome Measures:  LEFS 51      Treatments:  Instructed in and performed there ex program below.     Access Code: [email or web address]  URL: [email or web address]  Date: 09/06/2024  Prepared by: Bertin Caasnova    Exercises  - Clamshell  - 1 x daily - 7 x weekly - 3 sets - 10 reps  - Sidelying Hip Abduction  - 1 x daily - 7 x weekly - 3 sets - 10 reps  - ITB Stretch at Wall  - 1 x daily - 7 x weekly - 3 sets - 1 reps - 30 hold  EDUCATION:  Outpatient Education  Education Provided: Anatomy, Body Mechanics, Home Exercise Program (Home ex instructed and issued.)    Goals:  Active       PT Problem       The patient will demonstrate full understanding and competent performance of their home exercise program to maintain or potentially further improve their presenting condition.        Start:  09/06/24    Expected End:  09/06/24                           Current Participants as of 9/6/2024    Name Type Comments Contact Info    Terry Triana MD Referring Provider  [phone]    Signature pending    Bertin Casanova PT Physical Therapist  [phone]    Electronically signed by Bertin Casanova PT at 9/6/2024 1021 EDT

## 2024-09-06 NOTE — PROGRESS NOTES
Occupational Therapy  Lymphedema Treatment/Recheck    Patient Name: Justine Mesa  MRN: [id number]  Encounter Date: 9/6/2024  Time Calculation  Start Time: 1025  Stop Time: 1055  Time Calculation (min): 30 min  Today's Charges:     OT Therapeutic Procedures Time Entry  Self Care/Home Management (ADLs) Time Entry: 30     Insurance:  Visit number: 4  Certification Period Start Date: 09/06/24  Certification Period End Date: 11/29/24  Authorization info: [place]  Payor: MEDICARE / Plan: MEDICARE PART A AND B / Product Type: *No Product type* /     Current Problem  1. Lymphedema  Follow Up In Occupational Therapy        Problem List Items Addressed This Visit             ICD-10-CM    Lymphedema I89.0     Referred by:  Odell Sharma MD     Subjective   Current Condition:   Better    Pain:  0-10 (Numeric) Pain Score: 0 - No pain    Precautions:   Lymphedema Precautions  General: Lymphedema  Abdominal MLD:  Diverticulitis              Compression: Osteoporosis     Patient's Goal(s) for Therapy: manage the swelling and the pain     Objective   Removed wrapping/garments and skin/volume assessed. Measurements taken.  Right Lower Extremity:  R Metatarsal (cm): 21.8 cm  R Heel Y Angle: 29.4 cm  R Ankle (cm): 26.5 cm  R 10 cm Above Ankle (cm): 33.2 cm  R 20 cm Above Ankle (cm): 42 cm  R 30 cm Above Ankle (cm): 44.7 cm  R 40 cm Above Ankle (cm): 0 cm  R Knee (cm): 54.3 cm  R 10 cm Above Knee (cm): 62.8 cm  R 20 cm Above Knee (cm): 72.1 cm  R 30 cm Above Knee (cm): 0 cm  R 40 cm Above Knee (cm): 0 cm  R 50 cm Above Knee (cm): 0 cm  Right lower extremity total: 386.8  Left Lower Extremity:  L Metatarsal (cm): 21.5 cm  L Heel Y Angle: 30.4 cm  L Ankle (cm): 27.3 cm  L 10 cm Above Ankle (cm): 31.6 cm  L 20 cm Above Ankle (cm): 40.3 cm  L 30 cm Above Ankle (cm): 45 cm  L 40 cm Above Ankle (cm): 0 cm  L Knee (cm): 53 cm  L 10 cm Above Knee (cm): 60.6 cm  L 20 cm Above Knee (cm): 67.1 cm  L 30 cm Above Knee (cm): 0 cm  L 40 cm Above Knee  (cm): 0 cm  L 50 cm Above Knee (cm): 0 cm  Left Lower extremity total: 376.8    EDUCATION:   Individual(s) Educated: patient  Education Provided: Education: Symptom Management, donning and doffing compression, wear schedule with flat knit versus inelastic velcro, ordering at next opportunity, care of garments  Patient/Caregiver Demonstrated Understanding: Yes   Patient Response to Education: Patient/Caregiver verbalized understanding of information, Patient/Caregiver performed return demonstration of exercises/activities, and Patient/Caregiver asked appropriate questions    Problems:  Problems to be addressed: Needs garment fitting    Rehab Potential: Excellent    Assessment: Pt has been fitted for inelastic garments that she has been wearing and has been using her vasopneumatic pumps every night. She just received her flat knit garments and assessed fit. Only concern is distal to knee, patient to trial for a week and if it is bothersome, she will return for a visit and will need a remake. If no concerns, patient will cancel next visit and be officially discharged. Requested recertification for additional visits if needed with the remake.     Outcome Measures: OT Adult Other Outcome Measures  Lymphedema Life Impact Scale (LLIS): 17.65    Plan:  Planned Interventions include: Garment measuring and fitting  Frequency: as needed  Duration: 12 Weeks    Goals:  Active       Lymphedema       LTG -  Pt will demonstrate independence with donning and doffing of compression garment(s) per recommended wear schedule 100% of the time (Progressing)       Start:  05/27/24    Expected End:  11/29/24            LTG - Patient will have improved LLIS score by 8 points or greater indicating improving lymphedema and functional ability (Met)       Start:  05/27/24    Expected End:  08/16/24    Resolved:  09/06/24         LTG - By discharge the patient will be knowledgeable and compliant with home program, including lymphedema management  and exercises (Met)       Start:  05/27/24    Expected End:  08/16/24    Resolved:  09/06/24         STG - patient will be independent with diaphragmatic breathing exercises (Met)       Start:  05/27/24    Expected End:  08/16/24    Resolved:  09/06/24             Vinny Rodriguez, OT

## 2024-09-11 ENCOUNTER — TREATMENT (OUTPATIENT)
Dept: OCCUPATIONAL THERAPY | Facility: HOSPITAL | Age: 74
End: 2024-09-11
Payer: MEDICARE

## 2024-09-11 DIAGNOSIS — I89.0 LYMPHEDEMA: ICD-10-CM

## 2024-09-11 PROCEDURE — 97530 THERAPEUTIC ACTIVITIES: CPT | Mod: GO | Performed by: OCCUPATIONAL THERAPIST

## 2024-09-11 NOTE — PROGRESS NOTES
Occupational Therapy  Lymphedema Treatment    Patient Name: Justine Mesa  MRN: [id number]  Encounter Date: 9/11/2024  Time Calculation  Start Time: 1213  Stop Time: 1225  Time Calculation (min): 12 min  Today's Charges:     OT Therapeutic Procedures Time Entry  Therapeutic Activity Time Entry: 12     Insurance:  Visit number: 5  Certification Period Start Date: 09/06/24  Certification Period End Date: 11/29/24  [place]ization info: [place]  Payor: [place]ICARE / Plan: MEDICARE PART A AND B / Product Type: *No Product type* /     Current Problem  1. Lymphedema  Follow Up In Occupational Therapy        Problem List Items Addressed This Visit             ICD-10-CM    Lymphedema I89.0     Referred by: Odell Sharma MD     Subjective   Current Condition:   Worse      Precautions:   Lymphedema Precautions  General: Lymphedema  Abdominal MLD:  Diverticulitis              Compression: Osteoporosis     Patient's Goal(s) for Therapy: manage the swelling and the pain     Objective   Custom garments are not fitting properly at knee, cutting in and causing increased pain. Patient came back in for new measurements for remake of item. Therapist remeasured cD and cC bilaterally and adjusted measurements. Emailed to [facility] for remake and sent communication.    Meaurement form: Scanned into chart    Assessment: pt to continue wearing velcro garments for now and pumping, will have patient contact therapist when she receives the new remake.       Plan:  Hold at this time    Goals:  Active       Lymphedema       LTG -  Pt will demonstrate independence with donning and doffing of compression garment(s) per recommended wear schedule 100% of the time (Progressing)       Start:  05/27/24    Expected End:  11/29/24            LTG - Patient will have improved LLIS score by 8 points or greater indicating improving lymphedema and functional ability (Met)       Start:  05/27/24    Expected End:  08/16/24    Resolved:  09/06/24         LTG - By  discharge the patient will be knowledgeable and compliant with home program, including lymphedema management and exercises (Met)       Start:  05/27/24    Expected End:  08/16/24    Resolved:  09/06/24         STG - patient will be independent with diaphragmatic breathing exercises (Met)       Start:  05/27/24    Expected End:  08/16/24    Resolved:  09/06/24             Vinny Rodriguez, OT

## 2024-09-23 DIAGNOSIS — E03.9 HYPOTHYROIDISM, UNSPECIFIED TYPE: ICD-10-CM

## 2024-09-23 RX ORDER — LEVOTHYROXINE SODIUM 50 UG/1
50 TABLET ORAL DAILY
Qty: 90 TABLET | Refills: 0 | Status: SHIPPED | OUTPATIENT
Start: 2024-09-23

## 2024-10-08 DIAGNOSIS — J45.909 ASTHMATIC BRONCHITIS WITHOUT COMPLICATION, UNSPECIFIED ASTHMA SEVERITY, UNSPECIFIED WHETHER PERSISTENT (HHS-HCC): ICD-10-CM

## 2024-10-11 RX ORDER — FLUTICASONE FUROATE AND VILANTEROL TRIFENATATE 200; 25 UG/1; UG/1
1 POWDER RESPIRATORY (INHALATION) DAILY
Qty: 24 EACH | Refills: 3 | Status: SHIPPED | OUTPATIENT
Start: 2024-10-11

## 2024-10-23 ENCOUNTER — OFFICE VISIT (OUTPATIENT)
Dept: URGENT CARE | Facility: URGENT CARE | Age: 74
End: 2024-10-23
Payer: MEDICARE

## 2024-10-23 ENCOUNTER — HOSPITAL ENCOUNTER (OUTPATIENT)
Dept: RADIOLOGY | Facility: CLINIC | Age: 74
Discharge: HOME | End: 2024-10-23
Payer: MEDICARE

## 2024-10-23 VITALS
OXYGEN SATURATION: 96 % | SYSTOLIC BLOOD PRESSURE: 131 MMHG | RESPIRATION RATE: 16 BRPM | HEART RATE: 63 BPM | WEIGHT: 209 LBS | TEMPERATURE: 98 F | DIASTOLIC BLOOD PRESSURE: 84 MMHG | BODY MASS INDEX: 35.87 KG/M2

## 2024-10-23 DIAGNOSIS — J45.41 MODERATE PERSISTENT ASTHMA WITH EXACERBATION (HHS-HCC): Primary | ICD-10-CM

## 2024-10-23 DIAGNOSIS — R05.9 COUGH, UNSPECIFIED TYPE: ICD-10-CM

## 2024-10-23 PROCEDURE — 71046 X-RAY EXAM CHEST 2 VIEWS: CPT

## 2024-10-23 PROCEDURE — 71046 X-RAY EXAM CHEST 2 VIEWS: CPT | Mod: FOREIGN READ | Performed by: RADIOLOGY

## 2024-10-23 RX ORDER — ALBUTEROL SULFATE 0.83 MG/ML
2.5 SOLUTION RESPIRATORY (INHALATION) ONCE
Status: COMPLETED | OUTPATIENT
Start: 2024-10-23 | End: 2024-10-23

## 2024-10-23 RX ORDER — METHYLPREDNISOLONE 4 MG/1
TABLET ORAL
Qty: 21 TABLET | Refills: 0 | Status: SHIPPED | OUTPATIENT
Start: 2024-10-23 | End: 2024-10-30

## 2024-10-23 RX ORDER — AZITHROMYCIN 250 MG/1
TABLET, FILM COATED ORAL
Qty: 6 TABLET | Refills: 0 | Status: SHIPPED | OUTPATIENT
Start: 2024-10-23 | End: 2024-10-24

## 2024-10-24 RX ORDER — AZITHROMYCIN 250 MG/1
TABLET, FILM COATED ORAL
Qty: 6 TABLET | Refills: 0 | Status: SHIPPED | OUTPATIENT
Start: 2024-10-24

## 2024-10-24 NOTE — PATIENT INSTRUCTIONS
Acute asthma exacerbation- Albuterol neb trialed, preneb O2 96%, post neb O2 93-96%. Albuterol inhaler as needed for wheeze. Advised to go to ER if worsening difficulty breathing or chest pain.   Start Zpack as directed; 2 tabs day 1 and 1 tab day 2-5. Take with food and probiotic. Start Medrol dose robbin, avoid NSAIDS while on oral steroid to prevent upset stomach. Monitor BP daily while on steroid. Follow up with PCP on Friday for recheck of breathing.    Chest xray reviewed negative for pneumonia

## 2024-11-06 ENCOUNTER — HOSPITAL ENCOUNTER (OUTPATIENT)
Dept: RADIOLOGY | Facility: HOSPITAL | Age: 74
Discharge: HOME | End: 2024-11-06
Payer: MEDICARE

## 2024-11-06 DIAGNOSIS — E78.5 HYPERLIPIDEMIA, UNSPECIFIED: ICD-10-CM

## 2024-11-06 DIAGNOSIS — I10 ESSENTIAL (PRIMARY) HYPERTENSION: ICD-10-CM

## 2024-11-06 PROCEDURE — 75571 CT HRT W/O DYE W/CA TEST: CPT

## 2024-11-12 ENCOUNTER — TREATMENT (OUTPATIENT)
Dept: OCCUPATIONAL THERAPY | Facility: HOSPITAL | Age: 74
End: 2024-11-12
Payer: MEDICARE

## 2024-11-12 DIAGNOSIS — I89.0 LYMPHEDEMA: ICD-10-CM

## 2024-11-12 PROCEDURE — 97016 VASOPNEUMATIC DEVICE THERAPY: CPT | Mod: GO | Performed by: OCCUPATIONAL THERAPIST

## 2024-11-12 PROCEDURE — 97140 MANUAL THERAPY 1/> REGIONS: CPT | Mod: GO | Performed by: OCCUPATIONAL THERAPIST

## 2024-11-12 NOTE — PROGRESS NOTES
Occupational Therapy  Lymphedema Treatment    Patient Name: Justine Mesa  MRN: [id number]  Encounter Date: 11/12/2024  Time Calculation  Start Time: 1130  Stop Time: 1215  Time Calculation (min): 45 min  Today's Charges:     OT Therapeutic Procedures Time Entry  Manual Therapy Time Entry: 15  OT Modalities Time Entry  Vasopneumatic Devices Time Entry: 30     Insurance:  Visit number: 6  Certification Period Start Date: 09/06/24  Certification Period End Date: 11/29/24  [place] info[place]  Payor: [place]ARE / Plan: MEDICARE PART A AND B / Product Type: *No Product type* /     Current Problem  1. Lymphedema  Follow Up In Occupational Therapy        Problem List Items Addressed This Visit             ICD-10-CM    Lymphedema I89.0     Referred by: Odell Sharma MD     Subjective   Current Condition:   Worse pt states she is getting more full in abdomen since starting the pump. Did receive her new remake of knee highs, much better.    Pain:   0/10    Precautions:   Lymphedema Precautions  General: Lymphedema  Abdominal MLD:  Diverticulitis              Compression: Osteoporosis     Patient's Goal(s) for Therapy: manage the swelling and the pain    Objective   Abdominal and hip circumference:  Waist: 121.5cm  Hip(widest part): 135cm  Greater trochanter: 121.5cm    Patient performed pump trial with vendor on Trunk, RLE, and LLE using biopants at 45 mmHg x 30 min. Patient educated on donning and doffing of garment, operation of pump device, recommended schedule, use of pump without garments on, proper cleaning of pump sleeves. Patient tolerated well and demonstrated change in tissue motility.     Patient has been using her  compression pump 1 hour/day since 7/25/24. Lymphedema is chronic, persistent and severe as identified by her new onset of truncal pitting edema. Patient has been compliant with her compression, elevation, exercise, self MLD daily and practices good hygiene, reports compliance with all  medications and diet for 4+ weeks.     Trunk:  Pitting Edema in lower abdomen and buttocks which is newer since onset of treatment    Outcome Measures: OT Adult Other Outcome Measures  Lymphedema Life Impact Scale (LLIS): 23.529    Assessment: I am recommending this patient for a  compression pump - biopants garment. This advanced pump and garment is medically necessary to address the hips and abdomen that have increased in size throughout her time using the basic pump and will assist in managing the progression of the disease.       Plan:  Hold until pump arrives    Goals:  Active       Lymphedema       LTG -  Pt will demonstrate independence with donning and doffing of compression garment(s) per recommended wear schedule 100% of the time (Progressing)       Start:  05/27/24    Expected End:  11/29/24            LTG - Patient will have improved LLIS score by 8 points or greater indicating improving lymphedema and functional ability (Met)       Start:  05/27/24    Expected End:  08/16/24    Resolved:  09/06/24         LTG - By discharge the patient will be knowledgeable and compliant with home program, including lymphedema management and exercises (Met)       Start:  05/27/24    Expected End:  08/16/24    Resolved:  09/06/24         STG - patient will be independent with diaphragmatic breathing exercises (Met)       Start:  05/27/24    Expected End:  08/16/24    Resolved:  09/06/24             Vinny Rodriguez, OT

## 2024-11-15 ENCOUNTER — HOSPITAL ENCOUNTER (OUTPATIENT)
Dept: RESPIRATORY THERAPY | Facility: HOSPITAL | Age: 74
Discharge: HOME | End: 2024-11-15
Payer: MEDICARE

## 2024-11-15 ENCOUNTER — HOSPITAL ENCOUNTER (OUTPATIENT)
Dept: RADIOLOGY | Facility: HOSPITAL | Age: 74
Discharge: HOME | End: 2024-11-15
Payer: MEDICARE

## 2024-11-15 ENCOUNTER — APPOINTMENT (OUTPATIENT)
Dept: PULMONOLOGY | Facility: HOSPITAL | Age: 74
End: 2024-11-15
Payer: MEDICARE

## 2024-11-15 ENCOUNTER — OFFICE VISIT (OUTPATIENT)
Dept: PULMONOLOGY | Facility: HOSPITAL | Age: 74
End: 2024-11-15
Payer: MEDICARE

## 2024-11-15 VITALS
TEMPERATURE: 97.1 F | BODY MASS INDEX: 34.84 KG/M2 | WEIGHT: 203 LBS | OXYGEN SATURATION: 95 % | HEART RATE: 76 BPM | DIASTOLIC BLOOD PRESSURE: 80 MMHG | SYSTOLIC BLOOD PRESSURE: 121 MMHG

## 2024-11-15 DIAGNOSIS — J84.9 ILD (INTERSTITIAL LUNG DISEASE) (MULTI): ICD-10-CM

## 2024-11-15 DIAGNOSIS — G47.33 OSA (OBSTRUCTIVE SLEEP APNEA): ICD-10-CM

## 2024-11-15 DIAGNOSIS — R91.1 LUNG NODULE: ICD-10-CM

## 2024-11-15 DIAGNOSIS — R05.3 CHRONIC COUGH: ICD-10-CM

## 2024-11-15 DIAGNOSIS — J84.9 ILD (INTERSTITIAL LUNG DISEASE) (MULTI): Primary | ICD-10-CM

## 2024-11-15 LAB
MGC ASCENT PFT - FEV1 - PRE: 2.12
MGC ASCENT PFT - FEV1 - PRE: 2.12
MGC ASCENT PFT - FEV1 - PREDICTED: 2.08
MGC ASCENT PFT - FEV1 - PREDICTED: 2.08
MGC ASCENT PFT - FVC - PRE: 2.96
MGC ASCENT PFT - FVC - PRE: 2.96
MGC ASCENT PFT - FVC - PREDICTED: 2.69
MGC ASCENT PFT - FVC - PREDICTED: 2.69

## 2024-11-15 PROCEDURE — 71250 CT THORAX DX C-: CPT

## 2024-11-15 PROCEDURE — 94729 DIFFUSING CAPACITY: CPT

## 2024-11-15 PROCEDURE — 71250 CT THORAX DX C-: CPT | Performed by: RADIOLOGY

## 2024-11-15 PROCEDURE — 99214 OFFICE O/P EST MOD 30 MIN: CPT | Performed by: INTERNAL MEDICINE

## 2024-11-15 PROCEDURE — 1126F AMNT PAIN NOTED NONE PRSNT: CPT | Performed by: INTERNAL MEDICINE

## 2024-11-15 PROCEDURE — 1157F ADVNC CARE PLAN IN RCRD: CPT | Performed by: INTERNAL MEDICINE

## 2024-11-15 ASSESSMENT — PAIN SCALES - GENERAL: PAINLEVEL_OUTOF10: 0-NO PAIN

## 2024-11-15 NOTE — PATIENT INSTRUCTIONS
Ms Mesa , it was a pleasure seeing you in clinic today. We discussed the following:     -You will continue to use your inhalers.  -You will continue to go to pulmonary rehab  -The CT scan showed no big pneumonias. I will review with radiology and update you next week    Will see you back in clinic in 3 months
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
## 2024-11-18 ENCOUNTER — APPOINTMENT (OUTPATIENT)
Dept: CARDIAC REHAB | Facility: HOSPITAL | Age: 74
End: 2024-11-18
Payer: MEDICARE

## 2024-11-20 ENCOUNTER — APPOINTMENT (OUTPATIENT)
Dept: PULMONOLOGY | Facility: HOSPITAL | Age: 74
End: 2024-11-20
Payer: MEDICARE

## 2024-11-20 ENCOUNTER — APPOINTMENT (OUTPATIENT)
Dept: PODIATRY | Facility: CLINIC | Age: 74
End: 2024-11-20
Payer: MEDICARE

## 2024-11-20 DIAGNOSIS — M79.671 PAIN IN BOTH FEET: ICD-10-CM

## 2024-11-20 DIAGNOSIS — M79.672 PAIN IN BOTH FEET: ICD-10-CM

## 2024-11-20 DIAGNOSIS — M79.676 PAIN AROUND TOENAIL: Primary | ICD-10-CM

## 2024-11-20 DIAGNOSIS — B35.1 ONYCHOMYCOSIS OF TOENAIL: ICD-10-CM

## 2024-11-20 DIAGNOSIS — R60.0 EDEMA OF LOWER EXTREMITY: ICD-10-CM

## 2024-11-20 PROCEDURE — 99212 OFFICE O/P EST SF 10 MIN: CPT | Performed by: PODIATRIST

## 2024-11-20 PROCEDURE — 1157F ADVNC CARE PLAN IN RCRD: CPT | Performed by: PODIATRIST

## 2024-11-20 PROCEDURE — 1036F TOBACCO NON-USER: CPT | Performed by: PODIATRIST

## 2024-11-20 PROCEDURE — G2211 COMPLEX E/M VISIT ADD ON: HCPCS | Performed by: PODIATRIST

## 2024-11-20 PROCEDURE — 1160F RVW MEDS BY RX/DR IN RCRD: CPT | Performed by: PODIATRIST

## 2024-11-20 PROCEDURE — 1159F MED LIST DOCD IN RCRD: CPT | Performed by: PODIATRIST

## 2024-11-20 NOTE — PROGRESS NOTES
History of Present Illness:   Patient states they are here for help trimming nails  Denies trauma  Unable to safely trim on own  Has swelling in legs, lymphedema. Wear compression hose daily  No other pedal complaints    Past Medical History  Past Medical History:   Diagnosis Date    Acute bronchitis, unspecified     Acute bronchitis, unspecified organism    Acute frontal sinusitis, unspecified 01/06/2020    Acute frontal sinusitis    Acute frontal sinusitis, unspecified 03/07/2018    Acute frontal sinusitis    Acute maxillary sinusitis, unspecified 01/06/2020    Acute maxillary sinusitis    Acute maxillary sinusitis, unspecified 11/09/2017    Acute maxillary antritis    Acute tonsillitis due to other specified organisms     Acute tonsillitis due to other specified organisms    Acute tonsillitis due to other specified organisms 01/31/2017    Acute tonsillitis due to other specified organisms    Acute upper respiratory infection, unspecified 04/18/2017    Viral URI with cough    Acute upper respiratory infection, unspecified 06/07/2017    Acute URI    Chronic frontal sinusitis 03/07/2018    Frontal sinusitis    Diaphragmatic hernia without obstruction or gangrene 06/13/2022    Paraesophageal hiatal hernia    Hypothyroidism, unspecified 01/04/2022    Hypothyroidism    Pain in unspecified lower leg 07/16/2019    Calf pain    Personal history of other diseases of the digestive system 07/11/2022    History of gastroesophageal reflux (GERD)    Personal history of other diseases of the digestive system     History of gastroesophageal reflux (GERD)    Personal history of other diseases of the digestive system     History of hemorrhoids    Personal history of other diseases of the respiratory system 03/07/2018    History of sore throat    Personal history of other diseases of the respiratory system 11/09/2017    History of acute pharyngitis    Personal history of other diseases of the respiratory system 11/21/2017    History  of acute bronchitis    Personal history of other diseases of the respiratory system 03/07/2018    History of acute pharyngitis    Personal history of other specified conditions     History of dizziness    Personal history of urinary calculi     Personal history of renal calculi       Medications and Allergies have been reviewed.    Review Of Systems:  GENERAL: No weight loss, malaise or fevers.  HEENT: Negative for frequent or significant headaches,   RESPIRATORY: Negative for cough, wheezing or shortness of breath.  CARDIOVASCULAR: Negative for chest pain, leg swelling or palpitations.    Examination of Both Lower Extremities:     Vascular exam: Dorsalis pedis and Posterior Tibial pulses palpable 2/4 bilateral. Capillary refill time less than 3 seconds b/l. No Hair growth noted to digits. Mild +1 non pitting edema noted. Skin temp warm to warm from proximal to distal b/l. No varicosities noted.     Neurologic exam: Vibratory, protective and proprioception intact b/l. No neurologic deficits noted.      Musculoskeletal exam: Muscle strength 5/5 for all major muscle groups tested in the lower extremity b/l.      Dermatologic exam: Nails 1-5 b/l discolored and elongated. No SOI noted Webspaces 1-4 b/l are clean, dry and intact. No primary or secondary skin lesions noted. No open lesions or wounds noted at this time .    1. Pain around toenail        2. Edema of lower extremity        3. Onychomycosis of toenail        4. Pain in both feet          Patient exam and eval  Nails debrided  Monitor swelling  Recommend compression socks - diligent in wearing  No other pedal concerns  Fu prn  Patient was in agreement to this plan. All questions answered.    PCP Dr Lopez 7.12.2024    Dennise Hays DPM  [phone]  [facility] [address]  Fax: [phone]

## 2024-11-22 ENCOUNTER — APPOINTMENT (OUTPATIENT)
Dept: PULMONOLOGY | Facility: HOSPITAL | Age: 74
End: 2024-11-22
Payer: MEDICARE

## 2024-11-25 DIAGNOSIS — J84.9 ILD (INTERSTITIAL LUNG DISEASE) (MULTI): ICD-10-CM

## 2024-11-27 ENCOUNTER — APPOINTMENT (OUTPATIENT)
Dept: CARDIAC REHAB | Facility: HOSPITAL | Age: 74
End: 2024-11-27
Payer: MEDICARE

## 2024-12-02 ENCOUNTER — APPOINTMENT (OUTPATIENT)
Dept: CARDIAC REHAB | Facility: HOSPITAL | Age: 74
End: 2024-12-02
Payer: MEDICARE

## 2024-12-03 NOTE — PROGRESS NOTES
Pulmonary Rehabilitation Assessment    Name: Justine Mesa  Medical Record Number: [id number]  YOB: 1950    Today’s Date: 12/3/2024  Primary Care Physician: Odell Sharma MD  Referring Physician: Dr. Gregory    Program Location: [facility]    General  Primary Diagnosis: ILD  Date of Diagnosis: 2020    Session #: 1-initial    Oxygen Assessment    SP02 rest: 95 %  SP02 exertion: 94 %    Oxygen Use  Requires supplemental O2:    Liters at Rest: RA   Liters with Exertion: RA  Using O2 as prescribed:  NA      Oxygen (Supplemental 02 use only)  Demonstrates appropriate knowledge of 02 use? NA  Demonstrates knowledge of 02 safety? NA  Home 02 system:  Portable 02:    Hypoxemia managed?: Yes    Home Pulse Oximeter?: No      MMRC Survey score:  Intake: 0  Discharge: -    Goal Status: In progress  Oxygen Goals:  Oxygen Interventions:      Psychosocial Assessment    Pt reported/currently experiencing: No  Currently seeing a mental health provider: No  Social Support: Yes, Whom:  PHQ-9 Survey Score:   Intake: 1  Discharge: -    CAT Survey Score:  Intake:8  Discharge:    Learning assessment/barriers: None  Preferred learning method: Auditory, Visual, and Reading handout     Educational Assessment:  Pulmonary Knowledge Test:   Intake: 14/15  Discharge: -/15    Stages of Change:Action    Goal Status: In progress  Psychosocial Goals: Maintain and/or improve current psychosocial status.  Psychosocial Interventions/Education: Ask patient at least once every 30 days about changes regarding psychosocial status.    Nutrition Assessment:    Current Dietary Guidelines: None  Barriers to dietary change: No     Diet Habit Survey: drop down selection of Picture Your Plate, Yes   Plate: Pre   Intake: 50  Discharge: -    Diabetes Assessment    Diabetes:No   Medication:  none  Last Hemoglobin A1C: NA    Last Hemoglobin A1C date: NA  Pt monitors BS at home: No   Frequency: NA  FBS range: NA  Hypoglycemic  "Episodes:  NA    Weight Management    Height: 5'4\"  Weight: 203.9  BMI: 35   Goal Status: In progress  Nutrition Goals: Follow up with dietitian and/or enrolled in weight management program  Nutrition Interventions/Education: Give support materials as necessary; Weight measurement at every session.    Exercise Assessment  Current Home Exercise:  No ,sedentary  Mode: -  Days/Week: -  Min/Day: -    6-minute Walk Assessment:  Intake: 6-MWT distance (meters):305     FiO2:     SPO2:  Discharge: 6-MWT distance (meters): -     FiO2:     SPO2:    Exercise Prescription:    Based on: {6-min walk test, [facility]   Frequency:  4-7 days/week   Mode: Treadmill, NuStep, Recumbent Cycle, Arm Ergometer, and weights   Duration: 40 total aerobic minutes   Intensity: RPE 12       Target        Max METS 6.6       SpO2 Range 89 to 100 %       O2 Flow Rate RA to 6 L/min     Modality METS Load Duration   1 Rower 28-32W 3.8-4.2 MPH 5:00   2 Treadmill  2.5 2.0 10:00   3 Nustep 4000  2.5 2.0 10:00   4 Recumbent Cycle  2.5 2.0 `10:00   5 Weights  3 sets of 10 Arms-3Lbs/Legs 2Lbs As long as it takes   6 UBE 0-10W 38-40 RPM 5:00     Resistance Training: Yes   Home Exercise Prescription given: Yes     Goal Status: In progress  Exercise Goals: Aerobic exercise 4-6 days/week and a least 150 min per week  Improve 6MWT distance by at least 30 meters.  Exercise Interventions/Education: Increase duration by 2-4 min weekly up to 40 min of aerobic exercise as tolerated.  Increase exercise resisitance by 5-10% per week as tolerated.    Other Core Components/Risk Factor Assessment:    Medication adherence:  Current Medications:    Current Outpatient Medications   Medication Sig Dispense Refill    albuterol 90 mcg/actuation inhaler Inhale. INHALE 1 TO 2 PUFFS EVERY 4 TO 6 HOURS AS NEEDED.      ascorbic acid (Vitamin C) 1,000 mg tablet Take 1 tablet (1,000 mg) by mouth once daily.      azithromycin (Zithromax) 250 mg tablet Take 2 tabs day 1, take 1 tab day " 2-5 6 tablet 0    Breo Ellipta 200-25 mcg/dose inhaler INHALE ONE PUFF BY MOUTH once DAILY 24 each 3    calcium citrate-vitamin D3 (Citracal+D) 315 mg-5 mcg (200 unit) tablet Take 1 tablet by mouth once daily.      cetirizine (ZyrTEC) 10 mg tablet Take 1 tablet (10 mg) by mouth once daily. 90 tablet 3    cranberry 400 mg capsule Take 500 mg by mouth.      cyanocobalamin (Vitamin B-12) 500 mcg tablet Take by mouth once every 24 hours.      diclofenac sodium (Aspercreme Arthritis Pain) 1 % gel APPLY TOPICALLY TO AFFECTED AREA 4 TIMES DAILY AS NEEDED FOR KNEE PAIN 200 g 1    fluticasone (Flonase) 50 mcg/actuation nasal spray Administer 2 sprays into each nostril once daily. 48 g 3    furosemide (Lasix) 40 mg tablet Take by mouth.      guaiFENesin (Mucinex) 600 mg 12 hr tablet Take 2 tablets (1,200 mg) by mouth once daily.      levothyroxine (Synthroid, Levoxyl) 50 mcg tablet TAKE ONE TABLET BY MOUTH EVERY DAY 90 tablet 0    oxybutynin XL (Ditropan-XL) 10 mg 24 hr tablet Take 1 tablet (10 mg) by mouth once daily.      rosuvastatin (Crestor) 10 mg tablet Take 1 tablet (10 mg) by mouth once daily. 100 tablet 0     No current facility-administered medications for this visit.     Allergies:    Allergies   Allergen Reactions    Sulfa (Sulfonamide Antibiotics) Unknown       Taking medications as prescribed: Yes    If no, why: -   Uses pill box/organizer: No    Carries medication list: No     Blood Pressure Management:  Hx of Hypertension: No   Resting BP: 121/80    Smoking/Tobacco Assessment;    Social History     Tobacco Use   Smoking Status Never    Passive exposure: Never   Smokeless Tobacco Never       Goal Status: In progress  Other Core Component Goals: Learn to use controlled coughing techniques  Other Core Component Interventions/Education: Check resting BP pre/post exercise at every session     # of hospital admissions due to lung problems: 0  # of ER visits due to lung problems: 1    Individual Patient  Goals:  Increase distance walked  Increase strength and stamina  General Comments:        Rehab Staff Signature: Lynn Gautam, RRT

## 2024-12-04 ENCOUNTER — APPOINTMENT (OUTPATIENT)
Dept: CARDIAC REHAB | Facility: HOSPITAL | Age: 74
End: 2024-12-04
Payer: MEDICARE

## 2024-12-09 ENCOUNTER — CLINICAL SUPPORT (OUTPATIENT)
Dept: CARDIAC REHAB | Facility: HOSPITAL | Age: 74
End: 2024-12-09
Payer: MEDICARE

## 2024-12-09 DIAGNOSIS — J84.9 ILD (INTERSTITIAL LUNG DISEASE) (MULTI): Primary | ICD-10-CM

## 2024-12-09 PROCEDURE — 94626 PHY/QHP OP PULM RHB W/MNTR: CPT | Performed by: PEDIATRICS

## 2024-12-09 NOTE — PROGRESS NOTES
Pulmonary Daily Rehabilitation Assessment    Name: Justine Mesa  Medical Record Number: [id number]  YOB: 1950    Today’s Date: 12/9/2024  Primary Care Physician: Odell Sharma MD    Session #: 3    Oxygen Assessment    SP02 rest: 98 %  SP02 exertion: 97 %    Oxygen Use  Requires supplemental O2:    Liters at Rest: ra   Liters with Exertion: ra  Using O2 as prescribed: na      Weight Management    Height: 5'4''  Weight: 210lbs  BMI:        Daily Activity Log   Modality METS Load Duration   1 Pre-Exercise6/0      2 Treadmill 11/1 1.9 1.2 10:00   3 Nustep 4000 9/1 2.7-3.1 4.0 12:00   4 Recumbent Cycle       5 Weights       6 UBE  9/1 W5-10 Rpm48- 54 12:00   7 Rower      8 Aero Bike      9 Cardio-fit      10 Post-Exercise6/0            Other Core Components/Risk Factor Assessment:    Blood Pressure Management:  Hx of Hypertension: none  Resting BP: 118/77  POST BP: 113/73      General Comments:  Education: Sleep and handouts given  Patient remembers & utilizes purselip breathing-Reoriented to gym and equipment      Rehab Staff Signature: Juhi Andres, RRT

## 2024-12-11 ENCOUNTER — CLINICAL SUPPORT (OUTPATIENT)
Dept: CARDIAC REHAB | Facility: HOSPITAL | Age: 74
End: 2024-12-11
Payer: MEDICARE

## 2024-12-11 DIAGNOSIS — J84.9 ILD (INTERSTITIAL LUNG DISEASE) (MULTI): Primary | ICD-10-CM

## 2024-12-11 PROCEDURE — 94626 PHY/QHP OP PULM RHB W/MNTR: CPT | Performed by: PEDIATRICS

## 2024-12-11 NOTE — PROGRESS NOTES
Pulmonary Daily Rehabilitation Assessment    Name: Justine Mesa  Medical Record Number: [id number]  YOB: 1950    Today’s Date: 12/11/2024  Primary Care Physician: Odell Sharma MD    Session #: 4    Oxygen Assessment    SP02 rest: 99 %  SP02 exertion: 97 %    Oxygen Use  Requires supplemental O2:    Liters at Rest: ra   Liters with Exertion: ra  Using O2 as prescribed: na      Weight Management    Height: 5'4  Weight: 208.5  BMI:       Daily Activity Log   Modality METS Load Duration   1 Pre-Exercise6/0      2 Treadmill       3 Nustep 4000   10/2 2.9-3.1 5.0 15:00   4 Recumbent Cycle       5 Weights       6 UBE  11/2 W5-10 Rpm57 -64 15:00   7 Rower      8 Aero Bike  9/2 W31-49 Rpm 24- 29 10:00   9 Cardio-fit      10 Post-Exercise6/0            Other Core Components/Risk Factor Assessment:    Blood Pressure Management:  Hx of Hypertension: no  Resting BP: 133/80  POST BP: 125/74      General Comments:  Patient had no soreness from first workout-she is also working out at the [facility] on Tue. & Thurs.      Rehab Staff Signature: Juhi Andres, RRT

## 2024-12-16 ENCOUNTER — APPOINTMENT (OUTPATIENT)
Dept: CARDIAC REHAB | Facility: HOSPITAL | Age: 74
End: 2024-12-16
Payer: MEDICARE

## 2024-12-18 ENCOUNTER — APPOINTMENT (OUTPATIENT)
Dept: CARDIAC REHAB | Facility: HOSPITAL | Age: 74
End: 2024-12-18
Payer: MEDICARE

## 2024-12-23 ENCOUNTER — TELEMEDICINE (OUTPATIENT)
Dept: PHARMACY | Facility: HOSPITAL | Age: 74
End: 2024-12-23
Payer: MEDICARE

## 2024-12-23 ENCOUNTER — CLINICAL SUPPORT (OUTPATIENT)
Dept: CARDIAC REHAB | Facility: HOSPITAL | Age: 74
End: 2024-12-23
Payer: MEDICARE

## 2024-12-23 DIAGNOSIS — J84.9 ILD (INTERSTITIAL LUNG DISEASE) (MULTI): Primary | ICD-10-CM

## 2024-12-23 DIAGNOSIS — J45.909 ASTHMATIC BRONCHITIS WITHOUT COMPLICATION, UNSPECIFIED ASTHMA SEVERITY, UNSPECIFIED WHETHER PERSISTENT (HHS-HCC): Primary | ICD-10-CM

## 2024-12-23 PROCEDURE — 94626 PHY/QHP OP PULM RHB W/MNTR: CPT | Performed by: PEDIATRICS

## 2024-12-23 RX ORDER — FLUTICASONE FUROATE AND VILANTEROL TRIFENATATE 200; 25 UG/1; UG/1
1 POWDER RESPIRATORY (INHALATION) DAILY
Qty: 60 EACH | Refills: 11 | Status: SHIPPED | OUTPATIENT
Start: 2024-12-23

## 2024-12-23 NOTE — PROGRESS NOTES
Pulmonary Daily Rehabilitation Assessment    Name: Justine Mesa  Medical Record Number: [id number]  YOB: 1950    Today’s Date: 12/23/2024  Primary Care Physician: Odell Sharma MD    Session #: 5    Oxygen Assessment    SP02 rest: 99 %  SP02 exertion: 97 %    Oxygen Use  Requires supplemental O2:    Liters at Rest: ra   Liters with Exertiraon: ra  Using O2 as prescribed: na      Weight Management    Height: 5'4  Weight: 207lbs  BMI:        Daily Activity Log   Modality METS Load Duration   1 Pre-Exercise6/0      2 Treadmill 11/2 1.9 1.2 12:00   3 Nustep 4000       4 Recumbent Cycle       5 Weights       6 UBE11/2 W5-10 Rpm53- 63 15:00   7 Rower12/2 w43 Mph 2.4 10:00   8 Aero Bike      9 Cardio-fit      10 Post-Exercise6/0            Other Core Components/Risk Factor Assessment:    Blood Pressure Management:  Hx of Hypertension: no  Resting BP: 120/79  POST BP: 119/79      General Comments:  Patient declined education on Living ramirez/DNR - patient previously took class and has all in order & understands - she is also a nurse  Patient did not work out over the weekend -discussed importance - reoriented patient to [facility] Staff Signature: Juhi Andres, RRT

## 2024-12-23 NOTE — PROGRESS NOTES
[facility] Pharmacy Consult  Justine Mesa is a 74 y.o. female was referred to Clinical Pharmacy Team for a Pharmacy consult.  The patient was referred for their Asthma.    Referring Provider: Odell Sharma MD    Subjective   Allergies   Allergen Reactions    Sulfa (Sulfonamide Antibiotics) Unknown       [id number] [place] [facility] [address]  [address]  [facility] [place] [address]  Phone: [phone] Fax: [phone]    [facility] [place], [place] - [address]  [address]  [place] [address]  Phone: [phone] Fax: [phone]      HPI  PULMONARY ASSESSMENT  Patient has been diagnosed with: Asthma  does see a pulmonologist  Last visit: 11/15/24    Current Regimen:  Breo Ellipta 1 buff daily    Clarifications to above regimen: none   Adverse Effects: none   Appropriate technique? Yes    Historical Treatment  none    How often do you use your rescue inhaler? 2-3 times a week    Symptom Assessment:  Current symptoms:  noen  Symptoms are improved  Triggers: none  Alleviating factors: none    Exacerbation Hx:  When was your last hospitalization for an exacerbation? none  When was the last time you were treated with antibiotics and/or steroids? none     Immunization History:  Up to date    Pt reports needing help with cost of her Breo inhaler.    Review of Systems    Objective     There were no vitals taken for this visit.     LAB  Lab Results   Component Value Date    BILITOT 0.6 07/15/2024    CALCIUM 9.3 07/15/2024    CO2 29 07/15/2024     07/15/2024    CREATININE 0.86 07/15/2024    GLUCOSE 93 07/15/2024    ALKPHOS 60 07/15/2024    K 3.9 07/15/2024    PROT 6.7 07/15/2024     07/15/2024    AST 16 07/15/2024    ALT 10 07/15/2024    BUN 20 07/15/2024    ANIONGAP 13 07/15/2024    MG 2.02 01/17/2019    ALBUMIN 4.0 07/15/2024    GFRF >90 01/24/2023     Lab Results   Component Value Date    TRIG 193 (H) 07/15/2024    CHOL 243 (H) 07/15/2024    LDLCALC 156 (H)  07/15/2024    HDL 48.8 07/15/2024     Lab Results   Component Value Date    HGBA1C 5.4 09/24/2020       Current Outpatient Medications on File Prior to Visit   Medication Sig Dispense Refill    albuterol 90 mcg/actuation inhaler Inhale. INHALE 1 TO 2 PUFFS EVERY 4 TO 6 HOURS AS NEEDED.      ascorbic acid (Vitamin C) 1,000 mg tablet Take 1 tablet (1,000 mg) by mouth once daily.      azithromycin (Zithromax) 250 mg tablet Take 2 tabs day 1, take 1 tab day 2-5 6 tablet 0    Breo Ellipta 200-25 mcg/dose inhaler INHALE ONE PUFF BY MOUTH once DAILY 24 each 3    calcium citrate-vitamin D3 (Citracal+D) 315 mg-5 mcg (200 unit) tablet Take 1 tablet by mouth once daily.      cetirizine (ZyrTEC) 10 mg tablet Take 1 tablet (10 mg) by mouth once daily. 90 tablet 3    cranberry 400 mg capsule Take 500 mg by mouth.      cyanocobalamin (Vitamin B-12) 500 mcg tablet Take by mouth once every 24 hours.      diclofenac sodium (Aspercreme Arthritis Pain) 1 % gel APPLY TOPICALLY TO AFFECTED AREA 4 TIMES DAILY AS NEEDED FOR KNEE PAIN 200 g 1    fluticasone (Flonase) 50 mcg/actuation nasal spray Administer 2 sprays into each nostril once daily. 48 g 3    furosemide (Lasix) 40 mg tablet Take by mouth.      guaiFENesin (Mucinex) 600 mg 12 hr tablet Take 2 tablets (1,200 mg) by mouth once daily.      levothyroxine (Synthroid, Levoxyl) 50 mcg tablet TAKE ONE TABLET BY MOUTH EVERY DAY 90 tablet 0    oxybutynin XL (Ditropan-XL) 10 mg 24 hr tablet Take 1 tablet (10 mg) by mouth once daily.      rosuvastatin (Crestor) 10 mg tablet Take 1 tablet (10 mg) by mouth once daily. 100 tablet 0     No current facility-administered medications on file prior to visit.        Assessment/Plan   Problem List Items Addressed This Visit       Asthmatic bronchitis ([facility]) - Primary     CONTINUE all meds as prescribed  Educate on side effects of Breo inhaler  Educate on how to use Breo inhaler  Asthma controlled at this time, will defer to pulmonology to make  changes  Discussed in depth benefits of  Patient Assistance Program ( PAP) to help reduce the cost of selected patient's medication to $0 COPAY. Informed patient that the process may take 2-3 weeks for an outcome. Patient is aware they are responsible for any copay amount, should they decide to fill the medication before approval.  Will send [facility]b Eligible prescription to the patient's Preferred  Pharmacy for  PAP:  Breo Ellipta  FUV in 2 weeks             Continue all meds under the continuation of care with the referring provider and clinical pharmacy team.    Rell Garcia PharmD     Verbal consent to manage patient's drug therapy was obtained from [the patient and/or an individual authorized to act on behalf of a patient]. They were informed they may decline to participate or withdraw from participation in pharmacy services at any time.

## 2024-12-23 NOTE — ASSESSMENT & PLAN NOTE
CONTINUE all meds as prescribed  Educate on side effects of Breo inhaler  Educate on how to use Breo inhaler  Asthma controlled at this time, will defer to pulmonology to make changes  Discussed in depth benefits of  Patient Assistance Program ( PAP) to help reduce the cost of selected patient's medication to $0 COPAY. Informed patient that the process may take 2-3 weeks for an outcome. Patient is aware they are responsible for any copay amount, should they decide to fill the medication before approval.  Will send [facility]b Eligible prescription to the patient's Preferred  Pharmacy for  PAP:  Breo Ellipta  FUV in 2 weeks

## 2024-12-30 ENCOUNTER — CLINICAL SUPPORT (OUTPATIENT)
Dept: CARDIAC REHAB | Facility: HOSPITAL | Age: 74
End: 2024-12-30
Payer: MEDICARE

## 2024-12-30 DIAGNOSIS — J84.9 ILD (INTERSTITIAL LUNG DISEASE) (MULTI): Primary | ICD-10-CM

## 2024-12-30 PROCEDURE — 94626 PHY/QHP OP PULM RHB W/MNTR: CPT | Performed by: PEDIATRICS

## 2024-12-30 NOTE — PROGRESS NOTES
Pulmonary Daily Rehabilitation Assessment    Name: Justine Mesa  Medical Record Number: [id number]  YOB: 1950    Today’s Date: 12/30/2024  Primary Care Physician: Odell Sharma MD    Session #: 6    Oxygen Assessment    SP02 rest: 98 %  SP02 exertion: 97 %    Oxygen Use  Requires supplemental O2:    Liters at Rest: ra   Liters with Exertion: ra  Using O2 as prescribed: na      Weight Management    Height: 5'4  Weight: 210lbs  BMI:        Daily Activity Log   Modality METS Load Duration   1 Pre-Exercise6/0      2 Treadmill       3 Nustep 4000       4 Recumbent Cycle       5 Weights       6 UBE  11/2 W5-10 Rpm53- 60 16:00   7 Rower  9/1 w34 Mph2.2 11:00   8 Aero Bike11/2 W31-54 Rpm  24-31 12:00   9 Cardio-fit      10 Post-Exercise            Other Core Components/Risk Factor Assessment:    Blood Pressure Management:  Hx of Hypertension: no  Resting BP: 129/77  POST BP: 117/71      General Comments:  Education: Patient declined Energy conservation-she has had prior & is well educated  Utilizing purse lip breathing well        Rehab Staff Signature: Juhi Andres, RRT

## 2025-01-06 ENCOUNTER — APPOINTMENT (OUTPATIENT)
Dept: PHARMACY | Facility: HOSPITAL | Age: 75
End: 2025-01-06
Payer: MEDICARE

## 2025-01-06 DIAGNOSIS — J45.909 ASTHMATIC BRONCHITIS WITHOUT COMPLICATION, UNSPECIFIED ASTHMA SEVERITY, UNSPECIFIED WHETHER PERSISTENT (HHS-HCC): ICD-10-CM

## 2025-01-08 ENCOUNTER — CLINICAL SUPPORT (OUTPATIENT)
Dept: CARDIAC REHAB | Facility: HOSPITAL | Age: 75
End: 2025-01-08
Payer: MEDICARE

## 2025-01-08 DIAGNOSIS — J84.9 ILD (INTERSTITIAL LUNG DISEASE) (MULTI): Primary | ICD-10-CM

## 2025-01-08 PROCEDURE — 94626 PHY/QHP OP PULM RHB W/MNTR: CPT | Performed by: PEDIATRICS

## 2025-01-08 NOTE — PROGRESS NOTES
Pulmonary Daily Rehabilitation Assessment    Name: Justine Mesa  Medical Record Number: [id number]  YOB: 1950    Today’s Date: 1/8/2025  Primary Care Physician: Odell Sharma MD    Session #: 7    Oxygen Assessment    SP02 rest: 98 %  SP02 exertion: 96 %    Oxygen Use  Requires supplemental O2:    Liters at Rest: ra   Liters with Exertion: ra  Using O2 as prescribed: na      Weight Management    Height: 5'4  Weight: 206lbs  BMI:        Daily Activity Log   Modality METS Load Duration   1 Pre-Exercise6/0      2 Treadmill 9/2 1.9 1.2 13:00   3 Nustep 4000 9/0 3.0-3.2 6.0 15:00   4 Recumbent Cycle scifit 9/1 W13-16 4.0 15:00   5 Weights       6 UBE      7 Rower      8 Aero Bike      9 Cardio-fit      10 Post-Exercise6/0            Other Core Components/Risk Factor Assessment:    Blood Pressure Management:  Hx of Hypertension: no  Resting BP: 120/80  POST BP: 113/69      General Comments:  Patient worked out over the past weekend-Started on the [employer] toay      Rehab Staff Signature: Juhi Andres, RRT

## 2025-01-10 PROCEDURE — RXMED WILLOW AMBULATORY MEDICATION CHARGE

## 2025-01-10 NOTE — PROGRESS NOTES
[facility] Pharmacy Consult  Justine Mesa is a 74 y.o. female was referred to Clinical Pharmacy Team for a Pharmacy consult.  The patient was referred for their Asthma.    Referring Provider: Odell Sharma MD    Subjective   Allergies   Allergen Reactions    Sulfa (Sulfonamide Antibiotics) Unknown       [id number] - [place], [place] - [address]  [address]  [place] [address]  Phone: [phone] Fax: [phone]    [facility] [place] [address]  [address]  [address]  [place] [address]  Phone: [phone] Fax: [phone]    [facility]  [address], [address]  [facility] [address]  Phone: [phone] Fax: [phone]      [facility]  PULMONARY ASSESSMENT  Patient has been diagnosed with: Asthma  does see a pulmonologist  Last visit: 11/15/242    Current Regimen:  Breo Ellipta    Clarifications to above regimen: none   Adverse Effects: none   Appropriate technique? Yes    Historical Treatment  none    How often do you use your rescue inhaler? 1-2 times per week    Symptom Assessment:  Current symptoms:  none  Symptoms are improved  Triggers: none  Alleviating factors: none    Exacerbation Hx:  When was your last hospitalization for an exacerbation? none  When was the last time you were treated with antibiotics and/or steroids? none     Review of Systems    Objective     There were no vitals taken for this visit.     LAB  Lab Results   Component Value Date    BILITOT 0.6 07/15/2024    CALCIUM 9.3 07/15/2024    CO2 29 07/15/2024     07/15/2024    CREATININE 0.86 07/15/2024    GLUCOSE 93 07/15/2024    ALKPHOS 60 07/15/2024    K 3.9 07/15/2024    PROT 6.7 07/15/2024     07/15/2024    AST 16 07/15/2024    ALT 10 07/15/2024    BUN 20 07/15/2024    ANIONGAP 13 07/15/2024    MG 2.02 01/17/2019    ALBUMIN 4.0 07/15/2024    GFRF >90 01/24/2023     Lab Results   Component Value Date    TRIG 193 (H) 07/15/2024    CHOL 243 (H) 07/15/2024     LDLCALC 156 (H) 07/15/2024    HDL 48.8 07/15/2024     Lab Results   Component Value Date    HGBA1C 5.4 09/24/2020       Current Outpatient Medications on File Prior to Visit   Medication Sig Dispense Refill    albuterol 90 mcg/actuation inhaler Inhale. INHALE 1 TO 2 PUFFS EVERY 4 TO 6 HOURS AS NEEDED.      ascorbic acid (Vitamin C) 1,000 mg tablet Take 1 tablet (1,000 mg) by mouth once daily.      azithromycin (Zithromax) 250 mg tablet Take 2 tabs day 1, take 1 tab day 2-5 6 tablet 0    Breo Ellipta 200-25 mcg/dose inhaler Inhale 1 puff once daily. 60 each 11    calcium citrate-vitamin D3 (Citracal+D) 315 mg-5 mcg (200 unit) tablet Take 1 tablet by mouth once daily.      cetirizine (ZyrTEC) 10 mg tablet Take 1 tablet (10 mg) by mouth once daily. 90 tablet 3    cranberry 400 mg capsule Take 500 mg by mouth.      cyanocobalamin (Vitamin B-12) 500 mcg tablet Take by mouth once every 24 hours.      diclofenac sodium (Aspercreme Arthritis Pain) 1 % gel APPLY TOPICALLY TO AFFECTED AREA 4 TIMES DAILY AS NEEDED FOR KNEE PAIN 200 g 1    fluticasone (Flonase) 50 mcg/actuation nasal spray Administer 2 sprays into each nostril once daily. 48 g 3    furosemide (Lasix) 40 mg tablet Take by mouth.      guaiFENesin (Mucinex) 600 mg 12 hr tablet Take 2 tablets (1,200 mg) by mouth once daily.      levothyroxine (Synthroid, Levoxyl) 50 mcg tablet TAKE ONE TABLET BY MOUTH EVERY DAY 90 tablet 0    oxybutynin XL (Ditropan-XL) 10 mg 24 hr tablet Take 1 tablet (10 mg) by mouth once daily.      rosuvastatin (Crestor) 10 mg tablet Take 1 tablet (10 mg) by mouth once daily. 100 tablet 0     No current facility-administered medications on file prior to visit.        HISTORICAL PHARMACOTHERAPY  -none    DRUG INTERACTIONS  - none  Assessment/Plan   Problem List Items Addressed This Visit       Asthmatic bronchitis ([facility])     CONTINUE all meds as prescribed  Educate on side effects of Breo ellipta  Asthma controlled; no concerns with Breo and  using inhaler average 2 times per week  Following Pulmonology   PAP approved  FUV prn with pharmacy at this time             Continue all meds under the continuation of care with the referring provider and clinical pharmacy team.    Rell Garcia PharmD     Verbal consent to manage patient's drug therapy was obtained from [the patient and/or an individual authorized to act on behalf of a patient]. They were informed they may decline to participate or withdraw from participation in pharmacy services at any time.

## 2025-01-10 NOTE — ASSESSMENT & PLAN NOTE
CONTINUE all meds as prescribed  Educate on side effects of Breo ellipta  Asthma controlled; no concerns with Breo and using inhaler average 2 times per week  Following Pulmonology   PAP approved  JONATHAN prn with pharmacy at this time

## 2025-01-13 NOTE — PROGRESS NOTES
Pulmonary Rehabilitation Assessment 30 DAY    Name: Justine Mesa  Medical Record Number: [id number]  YOB: 1950    Today’s Date: 12/30/2024  Primary Care Physician: ROBE [facility] Rehabilitation Assessment    Name: Justine Mesa  Medical Record Number: [id number]  YOB: 1950    Today’s Date: 1/13/2025  Primary Care Physician: Odell Sharma MD  Referring Physician: Dr. Gregory    Program Location: [facility]  Primary Diagnosis: ILD  Date of Diagnosis: 2020    Session #:6   -  30 day assessment    Oxygen Assessment    SP02 rest: 98%  SP02 exertion: 97 %    Oxygen Use  Requires supplemental O2:    Liters at Rest: RA   Liters with Exertion: RA  Using O2 as prescribed:  NA      Oxygen (Supplemental 02 use only)  Demonstrates appropriate knowledge of 02 use? NA  Demonstrates knowledge of 02 safety? NA  Home 02 system:  Portable 02:    Hypoxemia managed?: Yes    Home Pulse Oximeter?: No      MMRC Survey score:  Intake: 0  Discharge: -    Goal Status: In progress  Oxygen Goals:Maintain  Oxygen Interventions:Add or titrate O2 as necessary to keep SpO2> 88%-Utilize breathing techniques      Psychosocial Assessment    Pt reported/currently experiencing: No  Currently seeing a mental health provider: No  Social Support: Yes, Whom:  PHQ-9 Survey Score:   Intake: 1  Discharge: -    CAT Survey Score:  Intake:8  Discharge:    Learning assessment/barriers: None  Preferred learning method: Auditory, Visual, and Reading handout     Educational Assessment:  Pulmonary Knowledge Test:   Intake: 14/15  Discharge: -/15    Stages of Change:Action    Goal Status: In progress  Psychosocial Goals: Maintain and/or improve current psychosocial status.  Psychosocial Interventions/Education: Ask patient at least once every 30 days about changes regarding psychosocial status.    Nutrition Assessment:    Current Dietary Guidelines: None  Barriers to dietary change: No     Diet  "Habit Survey: drop down selection of Picture Your Plate, Yes   Plate: Pre   Intake: 50  Discharge: -    Diabetes Assessment    Diabetes:No   Medication:  none  Last Hemoglobin A1C: NA    Last Hemoglobin A1C date: NA  Pt monitors BS at home: No   Frequency: NA  FBS range: NA  Hypoglycemic Episodes:  NA    Weight Management    Height: 5'4\"  Weight: 210lbs  BMI: 35   Goal Status: In progress  Nutrition Goals: Follow up with dietitian and/or enrolled in weight management program  Nutrition Interventions/Education: Give support materials as necessary; Weight measurement at every session.    Exercise Assessment  Current Home Exercise: 3 days per week  Mode: -bands & goes to gym  Days/Week: -3  Min/Day: ->= 30min     6-minute Walk Assessment:  Intake: 6-MWT distance (meters):305     FiO2:     SPO2:  Discharge: 6-MWT distance (meters): -     FiO2:     SPO2:    Exercise Prescription:    Based on: {6-min walk test, [facility]   Frequency:  4-7 days/week   Mode: Treadmill, NuStep, Recumbent Cycle, Arm Ergometer, and weights   Duration: 40 total aerobic minutes   Intensity: RPE 12       Target        Max METS 6.6       SpO2 Range 89 to 100 %       O2 Flow Rate RA to 6 L/min     Modality METS Load Duration   1 Rower 30W 2.2 MPH 11:00   2 Treadmill  1.9 1.2 12:00   3 Nustep 4000  2.9-3.1 5.0 15:00   4 Recumbent Cycle aero bike W31-54 Rpm 24-31 `12:00   5 Weights       6 UBE 5-10W 53-60 RPM 16:00     Resistance Training: Yes   Home Exercise Prescription given: Yes     Goal Status: In progress  Exercise Goals: Aerobic exercise 4-6 days/week and a least 150 min per week  Improve 6MWT distance by at least 30 meters.  Exercise Interventions/Education: Increase duration by 2-4 min weekly up to 40 min of aerobic exercise as tolerated.  Increase exercise resisitance by 5-10% per week as tolerated.    Other Core Components/Risk Factor Assessment:    Medication adherence:  Current Medications:    Current Outpatient Medications   Medication " Sig Dispense Refill    albuterol 90 mcg/actuation inhaler Inhale. INHALE 1 TO 2 PUFFS EVERY 4 TO 6 HOURS AS NEEDED.      ascorbic acid (Vitamin C) 1,000 mg tablet Take 1 tablet (1,000 mg) by mouth once daily.      azithromycin (Zithromax) 250 mg tablet Take 2 tabs day 1, take 1 tab day 2-5 6 tablet 0    Breo Ellipta 200-25 mcg/dose inhaler Inhale 1 puff once daily. 60 each 11    calcium citrate-vitamin D3 (Citracal+D) 315 mg-5 mcg (200 unit) tablet Take 1 tablet by mouth once daily.      cetirizine (ZyrTEC) 10 mg tablet Take 1 tablet (10 mg) by mouth once daily. 90 tablet 3    cranberry 400 mg capsule Take 500 mg by mouth.      cyanocobalamin (Vitamin B-12) 500 mcg tablet Take by mouth once every 24 hours.      diclofenac sodium (Aspercreme Arthritis Pain) 1 % gel APPLY TOPICALLY TO AFFECTED AREA 4 TIMES DAILY AS NEEDED FOR KNEE PAIN 200 g 1    fluticasone (Flonase) 50 mcg/actuation nasal spray Administer 2 sprays into each nostril once daily. 48 g 3    furosemide (Lasix) 40 mg tablet Take by mouth.      guaiFENesin (Mucinex) 600 mg 12 hr tablet Take 2 tablets (1,200 mg) by mouth once daily.      levothyroxine (Synthroid, Levoxyl) 50 mcg tablet TAKE ONE TABLET BY MOUTH EVERY DAY 90 tablet 0    oxybutynin XL (Ditropan-XL) 10 mg 24 hr tablet Take 1 tablet (10 mg) by mouth once daily.      rosuvastatin (Crestor) 10 mg tablet Take 1 tablet (10 mg) by mouth once daily. 100 tablet 0     No current facility-administered medications for this visit.     Allergies:    Allergies   Allergen Reactions    Sulfa (Sulfonamide Antibiotics) Unknown       Taking medications as prescribed: Yes    If no, why: -   Uses pill box/organizer: No    Carries medication list: No     Blood Pressure Management:  Hx of Hypertension: No   Resting BP: 129/77    Smoking/Tobacco Assessment;    Social History     Tobacco Use   Smoking Status Never    Passive exposure: Never   Smokeless Tobacco Never       Goal Status: In progress  Other Core Component  Goals: Learn to use controlled coughing techniques  Other Core Component Interventions/Education: Check resting BP pre/post exercise at every session     # of hospital admissions due to lung problems: 0  # of ER visits due to lung problems: 1    Individual Patient Goals:  Increase distance walked  Increase strength and stamina  General Comments:        Rehab Staff Signature: Juhi Andres RRT    Referring Physician:   Program Location:     [facility]  Primary Diagnosis:   Date of Diagnosis:     Session #:     Oxygen Assessment    SP02 rest:  %  SP02 exertion:  %    Oxygen Use  Requires supplemental O2:    Liters at Rest:    Liters with Exertion:   Using O2 as prescribed:      Oxygen (Supplemental 02 use only)  Demonstrates appropriate knowledge of 02 use?   Demonstrates knowledge of 02 safety?   Home 02 system:  Portable 02:    Hypoxemia managed?:    Home Pulse Oximeter?:      MMRC Survey score:  Intake:   Discharge:     Goal Status:   Oxygen Goals:  Oxygen Interventions:      Psychosocial Assessment    Pt reported/currently experiencing:   Currently seeing a mental health provider:   Social Support:   PHQ-9 Survey Score:   Intake:   Discharge:     CAT Survey Score:  Intake:  Discharge:    Learning assessment/barriers:   Preferred learning method:      Educational Assessment:  Pulmonary Knowledge Test:   Intake: /15  Discharge: /15    Stages of Change:    Goal Status:   Psychosocial Goals:   Psychosocial Interventions/Education:     Nutrition Assessment:    Current Dietary Guidelines:  Barriers to dietary change:     Diet Habit Survey: drop down selection of Picture Your Plate,    Plate:    Intake:   Discharge:     Diabetes Assessment    Diabetes:   Medication:   Last Hemoglobin A1C:     Last Hemoglobin A1C date:   Pt monitors BS at home:   Frequency:   FBS range:   Hypoglycemic Episodes:     Weight Management    Height:   Weight:   BMI:    Goal Status:   Nutrition Goals:   Nutrition Interventions/Education:      Exercise Assessment  Current Home Exercise:   Mode:   Days/Week:   Min/Day:     6-minute Walk Assessment:  Intake: 6-MWT distance (meters):     FiO2:     SPO2:  Discharge: 6-MWT distance (meters):      FiO2:     SPO2:    Exercise Prescription:    Based on: {   Frequency:   days/week   Mode:    Duration:  total aerobic minutes   Intensity: RPE        Target HR        Max METS        SpO2 Range  to  %       O2 Flow Rate  to  L/min     Modality METS Load Duration   1 Pre-Exercise      2 Treadmill       3 Nustep 4000       4 Recumbent Cycle       5 Weights       6 Post-Exercise        Resistance Training:   Home Exercise Prescription given:     Goal Status:   Exercise Goals:   Exercise Interventions/Education:     Other Core Components/Risk Factor Assessment:    Medication adherence:  Current Medications:    Current Outpatient Medications   Medication Sig Dispense Refill    albuterol 90 mcg/actuation inhaler Inhale. INHALE 1 TO 2 PUFFS EVERY 4 TO 6 HOURS AS NEEDED.      ascorbic acid (Vitamin C) 1,000 mg tablet Take 1 tablet (1,000 mg) by mouth once daily.      azithromycin (Zithromax) 250 mg tablet Take 2 tabs day 1, take 1 tab day 2-5 6 tablet 0    Breo Ellipta 200-25 mcg/dose inhaler Inhale 1 puff once daily. 60 each 11    calcium citrate-vitamin D3 (Citracal+D) 315 mg-5 mcg (200 unit) tablet Take 1 tablet by mouth once daily.      cetirizine (ZyrTEC) 10 mg tablet Take 1 tablet (10 mg) by mouth once daily. 90 tablet 3    cranberry 400 mg capsule Take 500 mg by mouth.      cyanocobalamin (Vitamin B-12) 500 mcg tablet Take by mouth once every 24 hours.      diclofenac sodium (Aspercreme Arthritis Pain) 1 % gel APPLY TOPICALLY TO AFFECTED AREA 4 TIMES DAILY AS NEEDED FOR KNEE PAIN 200 g 1    fluticasone (Flonase) 50 mcg/actuation nasal spray Administer 2 sprays into each nostril once daily. 48 g 3    furosemide (Lasix) 40 mg tablet Take by mouth.      guaiFENesin (Mucinex) 600 mg 12 hr tablet Take 2 tablets (1,200  mg) by mouth once daily.      levothyroxine (Synthroid, Levoxyl) 50 mcg tablet TAKE ONE TABLET BY MOUTH EVERY DAY 90 tablet 0    oxybutynin XL (Ditropan-XL) 10 mg 24 hr tablet Take 1 tablet (10 mg) by mouth once daily.      rosuvastatin (Crestor) 10 mg tablet Take 1 tablet (10 mg) by mouth once daily. 100 tablet 0     No current facility-administered medications for this visit.     Allergies:    Allergies   Allergen Reactions    Sulfa (Sulfonamide Antibiotics) Unknown       Taking medications as prescribed:    If no, why:    Uses pill box/organizer:    Carries medication list:     Blood Pressure Management:  Hx of Hypertension:   Resting BP:     Smoking/Tobacco Assessment;    Social History     Tobacco Use   Smoking Status Never    Passive exposure: Never   Smokeless Tobacco Never       Goal Status:   Other Core Component Goals:   Other Core Component Interventions/Education:      # of hospital admissions due to lung problems:   # of ER visits due to lung problems:     Individual Patient Goals:    General Comments:        Rehab Staff Signature: Juhi Andres, RRT

## 2025-01-14 ENCOUNTER — PHARMACY VISIT (OUTPATIENT)
Dept: PHARMACY | Facility: CLINIC | Age: 75
End: 2025-01-14
Payer: COMMERCIAL

## 2025-01-15 ENCOUNTER — APPOINTMENT (OUTPATIENT)
Dept: PRIMARY CARE | Facility: CLINIC | Age: 75
End: 2025-01-15
Payer: MEDICARE

## 2025-01-15 ENCOUNTER — APPOINTMENT (OUTPATIENT)
Dept: CARDIAC REHAB | Facility: HOSPITAL | Age: 75
End: 2025-01-15
Payer: MEDICARE

## 2025-01-22 ENCOUNTER — APPOINTMENT (OUTPATIENT)
Dept: CARDIAC REHAB | Facility: HOSPITAL | Age: 75
End: 2025-01-22
Payer: MEDICARE

## 2025-01-29 ENCOUNTER — APPOINTMENT (OUTPATIENT)
Dept: CARDIOLOGY | Facility: HOSPITAL | Age: 75
End: 2025-01-29
Payer: MEDICARE

## 2025-01-29 ENCOUNTER — APPOINTMENT (OUTPATIENT)
Dept: RADIOLOGY | Facility: HOSPITAL | Age: 75
End: 2025-01-29
Payer: MEDICARE

## 2025-01-29 ENCOUNTER — HOSPITAL ENCOUNTER (EMERGENCY)
Facility: HOSPITAL | Age: 75
Discharge: HOME | End: 2025-01-29
Attending: EMERGENCY MEDICINE
Payer: MEDICARE

## 2025-01-29 VITALS
SYSTOLIC BLOOD PRESSURE: 132 MMHG | BODY MASS INDEX: 34.15 KG/M2 | OXYGEN SATURATION: 95 % | TEMPERATURE: 98.9 F | HEART RATE: 82 BPM | HEIGHT: 64 IN | DIASTOLIC BLOOD PRESSURE: 81 MMHG | WEIGHT: 200 LBS | RESPIRATION RATE: 17 BRPM

## 2025-01-29 DIAGNOSIS — R07.9 CHEST PAIN, UNSPECIFIED TYPE: ICD-10-CM

## 2025-01-29 DIAGNOSIS — R05.1 ACUTE COUGH: ICD-10-CM

## 2025-01-29 DIAGNOSIS — E83.42 HYPOMAGNESEMIA: Primary | ICD-10-CM

## 2025-01-29 DIAGNOSIS — J10.1 INFLUENZA A: ICD-10-CM

## 2025-01-29 LAB
ALBUMIN SERPL BCP-MCNC: 4.2 G/DL (ref 3.4–5)
ALP SERPL-CCNC: 61 U/L (ref 33–136)
ALT SERPL W P-5'-P-CCNC: 10 U/L (ref 7–45)
ANION GAP SERPL CALC-SCNC: 15 MMOL/L (ref 10–20)
AST SERPL W P-5'-P-CCNC: 15 U/L (ref 9–39)
BASOPHILS # BLD AUTO: 0.37 X10*3/UL (ref 0–0.1)
BASOPHILS NFR BLD AUTO: 2.8 %
BILIRUB SERPL-MCNC: 0.6 MG/DL (ref 0–1.2)
BNP SERPL-MCNC: 26 PG/ML (ref 0–99)
BUN SERPL-MCNC: 12 MG/DL (ref 6–23)
CALCIUM SERPL-MCNC: 10 MG/DL (ref 8.6–10.3)
CARDIAC TROPONIN I PNL SERPL HS: 5 NG/L (ref 0–13)
CARDIAC TROPONIN I PNL SERPL HS: 6 NG/L (ref 0–13)
CHLORIDE SERPL-SCNC: 100 MMOL/L (ref 98–107)
CO2 SERPL-SCNC: 28 MMOL/L (ref 21–32)
CREAT SERPL-MCNC: 0.75 MG/DL (ref 0.5–1.05)
D DIMER PPP FEU-MCNC: 971 NG/ML FEU
EGFRCR SERPLBLD CKD-EPI 2021: 84 ML/MIN/1.73M*2
EOSINOPHIL # BLD AUTO: 0.1 X10*3/UL (ref 0–0.4)
EOSINOPHIL NFR BLD AUTO: 0.7 %
ERYTHROCYTE [DISTWIDTH] IN BLOOD BY AUTOMATED COUNT: 14.7 % (ref 11.5–14.5)
FLUAV RNA RESP QL NAA+PROBE: DETECTED
FLUBV RNA RESP QL NAA+PROBE: NOT DETECTED
GIANT PLATELETS BLD QL SMEAR: NORMAL
GLUCOSE SERPL-MCNC: 105 MG/DL (ref 74–99)
HCT VFR BLD AUTO: 47.7 % (ref 36–46)
HGB BLD-MCNC: 15.5 G/DL (ref 12–16)
IMM GRANULOCYTES # BLD AUTO: 0.77 X10*3/UL (ref 0–0.5)
IMM GRANULOCYTES NFR BLD AUTO: 5.8 % (ref 0–0.9)
LYMPHOCYTES # BLD AUTO: 0.89 X10*3/UL (ref 0.8–3)
LYMPHOCYTES NFR BLD AUTO: 6.7 %
MAGNESIUM SERPL-MCNC: 1.55 MG/DL (ref 1.6–2.4)
MCH RBC QN AUTO: 29.8 PG (ref 26–34)
MCHC RBC AUTO-ENTMCNC: 32.5 G/DL (ref 32–36)
MCV RBC AUTO: 92 FL (ref 80–100)
MONOCYTES # BLD AUTO: 1.71 X10*3/UL (ref 0.05–0.8)
MONOCYTES NFR BLD AUTO: 12.8 %
NEUTROPHILS # BLD AUTO: 9.5 X10*3/UL (ref 1.6–5.5)
NEUTROPHILS NFR BLD AUTO: 71.2 %
NRBC BLD-RTO: 0 /100 WBCS (ref 0–0)
PLATELET # BLD AUTO: 175 X10*3/UL (ref 150–450)
POTASSIUM SERPL-SCNC: 3.5 MMOL/L (ref 3.5–5.3)
PROT SERPL-MCNC: 7.1 G/DL (ref 6.4–8.2)
RBC # BLD AUTO: 5.2 X10*6/UL (ref 4–5.2)
RBC MORPH BLD: NORMAL
SARS-COV-2 RNA RESP QL NAA+PROBE: NOT DETECTED
SODIUM SERPL-SCNC: 139 MMOL/L (ref 136–145)
STOMATOCYTES BLD QL SMEAR: NORMAL
WBC # BLD AUTO: 13.3 X10*3/UL (ref 4.4–11.3)

## 2025-01-29 PROCEDURE — 96374 THER/PROPH/DIAG INJ IV PUSH: CPT | Mod: 59

## 2025-01-29 PROCEDURE — 99285 EMERGENCY DEPT VISIT HI MDM: CPT | Mod: 25 | Performed by: EMERGENCY MEDICINE

## 2025-01-29 PROCEDURE — 84484 ASSAY OF TROPONIN QUANT: CPT | Performed by: EMERGENCY MEDICINE

## 2025-01-29 PROCEDURE — 80053 COMPREHEN METABOLIC PANEL: CPT | Performed by: EMERGENCY MEDICINE

## 2025-01-29 PROCEDURE — 83735 ASSAY OF MAGNESIUM: CPT | Performed by: EMERGENCY MEDICINE

## 2025-01-29 PROCEDURE — 2550000001 HC RX 255 CONTRASTS: Performed by: EMERGENCY MEDICINE

## 2025-01-29 PROCEDURE — 71275 CT ANGIOGRAPHY CHEST: CPT | Performed by: RADIOLOGY

## 2025-01-29 PROCEDURE — 83880 ASSAY OF NATRIURETIC PEPTIDE: CPT | Performed by: EMERGENCY MEDICINE

## 2025-01-29 PROCEDURE — 93005 ELECTROCARDIOGRAM TRACING: CPT

## 2025-01-29 PROCEDURE — 85025 COMPLETE CBC W/AUTO DIFF WBC: CPT | Performed by: EMERGENCY MEDICINE

## 2025-01-29 PROCEDURE — 36415 COLL VENOUS BLD VENIPUNCTURE: CPT | Performed by: EMERGENCY MEDICINE

## 2025-01-29 PROCEDURE — 71275 CT ANGIOGRAPHY CHEST: CPT

## 2025-01-29 PROCEDURE — 87636 SARSCOV2 & INF A&B AMP PRB: CPT | Performed by: EMERGENCY MEDICINE

## 2025-01-29 PROCEDURE — 85379 FIBRIN DEGRADATION QUANT: CPT | Performed by: EMERGENCY MEDICINE

## 2025-01-29 PROCEDURE — 2500000004 HC RX 250 GENERAL PHARMACY W/ HCPCS (ALT 636 FOR OP/ED): Performed by: EMERGENCY MEDICINE

## 2025-01-29 RX ORDER — LANOLIN ALCOHOL/MO/W.PET/CERES
400 CREAM (GRAM) TOPICAL ONCE
Status: COMPLETED | OUTPATIENT
Start: 2025-01-29 | End: 2025-01-29

## 2025-01-29 RX ORDER — KETOROLAC TROMETHAMINE 15 MG/ML
15 INJECTION, SOLUTION INTRAMUSCULAR; INTRAVENOUS ONCE
Status: COMPLETED | OUTPATIENT
Start: 2025-01-29 | End: 2025-01-29

## 2025-01-29 RX ADMIN — IOHEXOL 75 ML: 350 INJECTION, SOLUTION INTRAVENOUS at 16:11

## 2025-01-29 RX ADMIN — Medication 400 MG: at 18:09

## 2025-01-29 RX ADMIN — KETOROLAC TROMETHAMINE 15 MG: 15 INJECTION, SOLUTION INTRAMUSCULAR; INTRAVENOUS at 18:09

## 2025-01-29 ASSESSMENT — PAIN - FUNCTIONAL ASSESSMENT: PAIN_FUNCTIONAL_ASSESSMENT: 0-10

## 2025-01-29 ASSESSMENT — PAIN DESCRIPTION - FREQUENCY: FREQUENCY: CONSTANT/CONTINUOUS

## 2025-01-29 ASSESSMENT — PAIN SCALES - GENERAL
PAINLEVEL_OUTOF10: 3
PAINLEVEL_OUTOF10: 4

## 2025-01-29 ASSESSMENT — PAIN DESCRIPTION - ONSET: ONSET: ONGOING

## 2025-01-29 ASSESSMENT — PAIN DESCRIPTION - DESCRIPTORS
DESCRIPTORS: DISCOMFORT
DESCRIPTORS: PRESSURE

## 2025-01-29 ASSESSMENT — PAIN DESCRIPTION - PAIN TYPE: TYPE: ACUTE PAIN

## 2025-01-29 ASSESSMENT — PAIN DESCRIPTION - LOCATION
LOCATION: HEAD
LOCATION: CHEST

## 2025-01-29 ASSESSMENT — PAIN DESCRIPTION - PROGRESSION: CLINICAL_PROGRESSION: NOT CHANGED

## 2025-01-29 NOTE — ED TRIAGE NOTES
Patient states she has been having shortness of breath and chest pain for about 3 days. No sick or cold symptoms

## 2025-01-29 NOTE — ED NOTES
Ambulated patient in [place] with portable P.OX patient remained at 94% and dropped to 93%. When back to bed patient up to 98% when resting      Agnes Pierre RN  01/29/25 7015

## 2025-01-30 LAB
ATRIAL RATE: 81 BPM
P AXIS: 50 DEGREES
P OFFSET: 196 MS
P ONSET: 146 MS
PR INTERVAL: 144 MS
Q ONSET: 218 MS
QRS COUNT: 14 BEATS
QRS DURATION: 88 MS
QT INTERVAL: 362 MS
QTC CALCULATION(BAZETT): 420 MS
QTC FREDERICIA: 400 MS
R AXIS: -5 DEGREES
T AXIS: 29 DEGREES
T OFFSET: 399 MS
VENTRICULAR RATE: 81 BPM

## 2025-01-31 NOTE — ED PROVIDER NOTES
HPI   Chief Complaint   Patient presents with    Chest Pain    Shortness of Breath     Patient states she has been having shortness of breath and chest pain for about 3 days. No sick or cold symptoms        HPI  Patient is a 74-year-old female presenting to the ED today for shortness of breath and chest pain.  Patient states that her symptoms have been ongoing for the past several days, though getting progressively worse.  She notes that she has also had a cough throughout this time.  She states that the symptoms were initially intermittent, but have become constant over the past day or 2.  She otherwise denies any fever, abdominal pain, nausea, vomiting, or changes in bowel or bladder habits.  Denies any obvious sick contacts.      Patient History   Past Medical History:   Diagnosis Date    Accidental fall 05/14/2024    Acute bronchitis, unspecified     Acute bronchitis, unspecified organism    Acute frontal sinusitis, unspecified 01/06/2020    Acute frontal sinusitis    Acute frontal sinusitis, unspecified 03/07/2018    Acute frontal sinusitis    Acute gastroenteritis 05/14/2024    Acute maxillary sinusitis, unspecified 01/06/2020    Acute maxillary sinusitis    Acute maxillary sinusitis, unspecified 11/09/2017    Acute maxillary antritis    Acute tonsillitis due to other specified organisms     Acute tonsillitis due to other specified organisms    Acute tonsillitis due to other specified organisms 01/31/2017    Acute tonsillitis due to other specified organisms    Acute upper respiratory infection, unspecified 04/18/2017    Viral URI with cough    Acute upper respiratory infection, unspecified 06/07/2017    Acute URI    Chronic frontal sinusitis 03/07/2018    Frontal sinusitis    Contact with and (suspected) exposure to covid-19 01/24/2023    Diaphragmatic hernia without obstruction or gangrene 06/13/2022    Paraesophageal hiatal hernia    Hypothyroidism, unspecified 01/04/2022    Hypothyroidism    Left sided  abdominal pain 05/14/2024    Pain in both feet 05/14/2024    Pain in unspecified lower leg 07/16/2019    Calf pain    Personal history of other diseases of the digestive system 07/11/2022    History of gastroesophageal reflux (GERD)    Personal history of other diseases of the digestive system     History of gastroesophageal reflux (GERD)    Personal history of other diseases of the digestive system     History of hemorrhoids    Personal history of other diseases of the respiratory system 03/07/2018    History of sore throat    Personal history of other diseases of the respiratory system 11/09/2017    History of acute pharyngitis    Personal history of other diseases of the respiratory system 11/21/2017    History of acute bronchitis    Personal history of other diseases of the respiratory system 03/07/2018    History of acute pharyngitis    Personal history of other specified conditions     History of dizziness    Personal history of urinary calculi     Personal history of renal calculi    Pneumonia 01/24/2023     Past Surgical History:   Procedure Laterality Date    GALLBLADDER SURGERY  10/11/2020    Gallbladder Surgery    HYSTERECTOMY  09/07/2020    Hysterectomy    OTHER SURGICAL HISTORY  05/04/2021    Colonoscopy    OTHER SURGICAL HISTORY  10/11/2020    Esophagogastroduodenoscopy     Family History   Problem Relation Name Age of Onset    Other (diabetes mellitus) Mother      Hypertension Mother      Hypertension Father       Social History     Tobacco Use    Smoking status: Never     Passive exposure: Never    Smokeless tobacco: Never   Substance Use Topics    Alcohol use: Yes     Comment: occasional    Drug use: Never       Physical Exam   ED Triage Vitals [01/29/25 1504]   Temperature Heart Rate Respirations BP   36.9 °C (98.5 °F) 94 18 (!) 143/91      Pulse Ox Temp Source Heart Rate Source Patient Position   (!) 93 % Oral Monitor Sitting      BP Location FiO2 (%)     Left arm --       Physical Exam  Vitals  and nursing note reviewed.   Constitutional:       General: She is not in acute distress.     Appearance: She is not toxic-appearing.   HENT:      Head: Normocephalic.      Mouth/Throat:      Mouth: Mucous membranes are moist.   Eyes:      Extraocular Movements: Extraocular movements intact.      Conjunctiva/sclera: Conjunctivae normal.   Cardiovascular:      Rate and Rhythm: Normal rate and regular rhythm.      Pulses: Normal pulses.   Pulmonary:      Effort: Pulmonary effort is normal. No respiratory distress.      Breath sounds: Normal breath sounds. No wheezing.   Abdominal:      General: There is no distension.      Palpations: Abdomen is soft.      Tenderness: There is no abdominal tenderness.   Musculoskeletal:         General: No swelling.      Cervical back: Neck supple.   Skin:     General: Skin is warm and dry.      Capillary Refill: Capillary refill takes less than 2 seconds.   Neurological:      General: No focal deficit present.      Mental Status: She is alert. Mental status is at baseline.           ED Course & MDM   ED Course as of 01/31/25 1141   Wed Jan 29, 2025   1534 EKG obtained at 1519, interpreted by myself.  Normal sinus rhythm with a ventricular rate of 81, no axis deviation, normal intervals, with no acute ischemic changes [VT]      ED Course User Index  [VT] Garima NAYLOR MD         Diagnoses as of 01/31/25 1141   Hypomagnesemia   Influenza A   Chest pain, unspecified type   Acute cough           No data recorded                             Medical Decision Making  Patient was seen and evaluated for chest pain and shortness of breath.  Differential diagnosis includes but is not limited to pneumonia, pneumothorax, COPD exacerbation, CHF exacerbation, PE, ACS, URI, Viral illness, Anemia, Electrolyte abnormality.  On arrival, patient is oxygenating well on room air.   Additional labs and imaging are ordered for further evaluation of the patient's symptoms.    CBC shows mild leukocytosis with  WBC of 13.3, otherwise unremarkable.  CMP is unremarkable.  Magnesium is slightly decreased at 1.55.  This is replaced with 400 mg magnesium oxide p.o.  BNP is normal at 26.  High-sensitivity troponin is normal at 5, with repeat still normal at 6.  Influenza A is positive.  CT angio chest for pulmonary embolism   Final Result   1. No evidence of acute pulmonary embolism.   2. Cardiomegaly and aortic calcification.   3. Prominence of the main pulmonary artery. Correlate with concern   for pulmonary hypertension.   4. 5 mm left lung base nodule.  No further follow-up is required,   however, if the patient has high risk factors for primary lung   malignancy, follow-up noncontrast CT scan chest in 12 months may be   obtained. (Tunde Meyers et al., Guidelines for management of   incidental pulmonary nodules detected on CT images: From the   [employer] Society 2017, Radiology. 2017 Jul;284 (1):228-243.)        MACRO:   None        Signed by: Miguel Cochran 1/29/2025 4:28 PM   Dictation workstation:   [id number]        On reevaluation, patient is now reporting a headache.  She is therefore administered Toradol for her headache.  Ambulatory pulse ox is performed and patient maintains SpO2 at 93% and greater. Patient was informed of their lab and imaging results, and all questions and concerns were answered. Discharge planning with close outpatient follow-up was discussed at this time, to which the patient was agreeable. Strict return precautions were given, and patient was discharged home in stable condition.      Procedure  Procedures     Garima NAYLOR MD  01/31/25 1140

## 2025-02-03 ENCOUNTER — APPOINTMENT (OUTPATIENT)
Dept: CARDIAC REHAB | Facility: HOSPITAL | Age: 75
End: 2025-02-03
Payer: MEDICARE

## 2025-02-03 ENCOUNTER — TELEPHONE (OUTPATIENT)
Dept: PRIMARY CARE | Facility: CLINIC | Age: 75
End: 2025-02-03
Payer: MEDICARE

## 2025-02-03 DIAGNOSIS — J10.1 INFLUENZA A: Primary | ICD-10-CM

## 2025-02-03 RX ORDER — GUAIFENESIN 600 MG/1
600 TABLET, EXTENDED RELEASE ORAL 2 TIMES DAILY
Qty: 10 TABLET | Refills: 0 | Status: SHIPPED | OUTPATIENT
Start: 2025-02-03 | End: 2025-02-08

## 2025-02-03 RX ORDER — AZITHROMYCIN 500 MG/1
500 TABLET, FILM COATED ORAL DAILY
Qty: 5 TABLET | Refills: 0 | Status: SHIPPED | OUTPATIENT
Start: 2025-02-03 | End: 2025-02-08

## 2025-02-03 RX ORDER — PREDNISONE 20 MG/1
40 TABLET ORAL DAILY
Qty: 10 TABLET | Refills: 0 | Status: SHIPPED | OUTPATIENT
Start: 2025-02-03 | End: 2025-02-08

## 2025-02-03 NOTE — TELEPHONE ENCOUNTER
Cough, sore throat ,headache, fatigue, weak   Flu a + diagnosed at er on 1/29  They didn't give her any medications, can you give something? She is miserable

## 2025-02-05 ENCOUNTER — APPOINTMENT (OUTPATIENT)
Dept: CARDIAC REHAB | Facility: HOSPITAL | Age: 75
End: 2025-02-05
Payer: MEDICARE

## 2025-02-06 NOTE — PROGRESS NOTES
60 day Pulmonary Rehabilitation Assessment 30 DAY    Name: Justine Mesa  Medical Record Number: [id number]  YOB: 1950    Today’s Date: 02/06/2025  Primary Care Physician: RO[facility] Assessment    Name: Justine Mesa  Medical Record Number: [id number]  YOB: 1950    Today’s Date: 1/13/2025  Primary Care Physician: Odell Sharma MD  Referring Physician: Dr. Gregory    Program Location: [facility]  Primary Diagnosis: ILD  Date of Diagnosis: 2020    Session #7-  60 day assessment    Oxygen Assessment    SP02 rest: 98%  SP02 exertion: 96%    Oxygen Use  Requires supplemental O2:    Liters at Rest: RA   Liters with Exertion: RA  Using O2 as prescribed: NA     Oxygen (Supplemental 02 use only)  Demonstrates appropriate knowledge of 02 use? NA  Demonstrates knowledge of 02 safety? NA  Home 02 system:  Portable 02:    Hypoxemia managed?: Yes    Home Pulse Oximeter?: No      MMRC Survey score:  Intake: 0  Discharge: -    Goal Status: In progress  Oxygen Goals:Maintain  Oxygen Interventions:Add or titrate O2 as necessary to keep SpO2> 88%-Utilize breathing techniques      Psychosocial Assessment    Pt reported/currently experiencing: No  Currently seeing a mental health provider: No  Social Support: Yes, Whom:  PHQ-9 Survey Score:   Intake: 1  Discharge: -    CAT Survey Score:  Intake:8  Discharge:    Learning assessment/barriers: None  Preferred learning method: Auditory, Visual, and Reading handout   Patient has no psychosocial concerns at this time,we will continue to discuss  Educational Assessment:  Pulmonary Knowledge Test:   Intake: 14/15  Discharge: -/15    Stages of Change:Action    Goal Status: In progress  Psychosocial Goals: Maintain and/or improve current psychosocial status.  Psychosocial Interventions/Education: Ask patient at least once every 30 days about changes regarding psychosocial status.    Nutrition  "Assessment:    Current Dietary Guidelines:None  Barriers to dietary change: No     Diet Habit Survey: drop down selection of Picture Your Plate, Yes   Plate: Pre   Intake: 50  Discharge: -    Diabetes Assessment    Diabetes:No   Medication: none  Last Hemoglobin A1C: NA    Last Hemoglobin A1C date: NA  Pt monitors BS at home: No   Frequency: NA  FBS range: NA  Hypoglycemic Episodes: NA  Discussed healthy lifestyle -patient does try to comply has lost 4 lbs  Weight Management    Height: 5'4\"  Weight: 206lbs  BMI: 35   Goal Status: In progress  Nutrition Goals: Follow up with dietitian and/or enrolled in weight management program  Nutrition Interventions/Education: Give support materials as necessary; Weight measurement at every session.    Exercise Assessment  Current Home Exercise: 3 days per week  Mode: -bands & goes to gym  Days/Week: -3  Min/Day: ->= 30min     6-minute Walk Assessment:  Intake: 6-MWT distance (meters):305     FiO2:ra     SPO2:95  Discharge: 6-MWT distance (meters): -     FiO2:     SPO2:    Exercise Prescription:    Based on: {6-min walk test, [facility]   Frequency:  4-7 days/week   Mode: Treadmill, NuStep, Recumbent Cycle, Arm Ergometer, and weights   Duration: 40 total aerobic minutes   Intensity: RPE 12       Target        Max METS 6.6       SpO2 Range 89 to 100 %       O2 Flow Rate RA to 6 L/min     Modality METS Load Duration   1 Rower      2 Treadmill  1.9 1.2 13:00   3 Nustep 4000  3.0-3.2 6.0 15:00   4 Recumbent Cycle scifit  9/1 W13-15 4.0 `14:00   5 Weights       6 UBE        Resistance Training: Yes   Home Exercise Prescription given: Yes     Goal Status: In progress  Exercise Goals: Aerobic exercise 4-6 days/week and a least 150 min per week  Improve 6MWT distance by at least 30 meters.  Exercise Interventions/Education: Increase duration by 2-4 min weekly up to 40 min of aerobic exercise as tolerated.  Increase exercise resisitance by 5-10% per week as tolerated.    Other Core " Components/Risk Factor Assessment:    Medication adherence:  Current Medications:    Current Outpatient Medications   Medication Sig Dispense Refill    albuterol 90 mcg/actuation inhaler Inhale. INHALE 1 TO 2 PUFFS EVERY 4 TO 6 HOURS AS NEEDED.      ascorbic acid (Vitamin C) 1,000 mg tablet Take 1 tablet (1,000 mg) by mouth once daily.      azithromycin (Zithromax) 250 mg tablet Take 2 tabs day 1, take 1 tab day 2-5 6 tablet 0    Breo Ellipta 200-25 mcg/dose inhaler Inhale 1 puff once daily. 60 each 11    calcium citrate-vitamin D3 (Citracal+D) 315 mg-5 mcg (200 unit) tablet Take 1 tablet by mouth once daily.      cetirizine (ZyrTEC) 10 mg tablet Take 1 tablet (10 mg) by mouth once daily. 90 tablet 3    cranberry 400 mg capsule Take 500 mg by mouth.      cyanocobalamin (Vitamin B-12) 500 mcg tablet Take by mouth once every 24 hours.      diclofenac sodium (Aspercreme Arthritis Pain) 1 % gel APPLY TOPICALLY TO AFFECTED AREA 4 TIMES DAILY AS NEEDED FOR KNEE PAIN 200 g 1    fluticasone (Flonase) 50 mcg/actuation nasal spray Administer 2 sprays into each nostril once daily. 48 g 3    furosemide (Lasix) 40 mg tablet Take by mouth.      guaiFENesin (Mucinex) 600 mg 12 hr tablet Take 2 tablets (1,200 mg) by mouth once daily.      levothyroxine (Synthroid, Levoxyl) 50 mcg tablet TAKE ONE TABLET BY MOUTH EVERY DAY 90 tablet 0    oxybutynin XL (Ditropan-XL) 10 mg 24 hr tablet Take 1 tablet (10 mg) by mouth once daily.      rosuvastatin (Crestor) 10 mg tablet Take 1 tablet (10 mg) by mouth once daily. 100 tablet 0     No current facility-administered medications for this visit.     Allergies:    Allergies   Allergen Reactions    Sulfa (Sulfonamide Antibiotics) Unknown       Taking medications as prescribed: Yes    If no, why: -   Uses pill box/organizer: No    Carries medication list: No     Blood Pressure Management:  Hx of Hypertension: No   Resting BP: 120/80    Smoking/Tobacco Assessment;    Social History     Tobacco Use    Smoking Status Never    Passive exposure: Never   Smokeless Tobacco Never       Goal Status: In progress  Other Core Component Goals: Learn to use controlled coughing techniques  Other Core Component Interventions/Education: Check resting BP pre/post exercise at every session     # of hospital admissions due to lung problems: 0  # of ER visits due to lung problems: 1    Individual Patient Goals:  Increase distance walked  Increase strength and stamina  General Comments:  Patient has been out ill (FLU A)and has only been able to attend the one session this 4 weeks -she did make advancement in that time      Rehab Staff Signature: Juhi Andres, RRT    Referring Physician:   Program Location:     General  Primary Diagnosis:   Date of Diagnosis:     Session #:     Oxygen Assessment    SP02 rest:  %  SP02 exertion:  %    Oxygen Use  Requires supplemental O2:    Liters at Rest:    Liters with Exertion:   Using O2 as prescribed:      Oxygen (Supplemental 02 use only)  Demonstrates appropriate knowledge of 02 use?   Demonstrates knowledge of 02 safety?   Home 02 system:  Portable 02:    Hypoxemia managed?:    Home Pulse Oximeter?:      MMRC Survey score:  Intake:   Discharge:     Goal Status:   Oxygen Goals:  Oxygen Interventions:      Psychosocial Assessment    Pt reported/currently experiencing:   Currently seeing a mental health provider:   Social Support:   PHQ-9 Survey Score:   Intake:   Discharge:     CAT Survey Score:  Intake:  Discharge:    Learning assessment/barriers:   Preferred learning method:      Educational Assessment:  Pulmonary Knowledge Test:   Intake: /15  Discharge: /15    Stages of Change:    Goal Status:   Psychosocial Goals:   Psychosocial Interventions/Education:     Nutrition Assessment:    Current Dietary Guidelines:  Barriers to dietary change:     Diet Habit Survey: drop down selection of Picture Your Plate,    Plate:    Intake:   Discharge:     Diabetes  Assessment    Diabetes:   Medication:   Last Hemoglobin A1C:     Last Hemoglobin A1C date:   Pt monitors BS at home:   Frequency:   FBS range:   Hypoglycemic Episodes:     Weight Management    Height:   Weight:   BMI:    Goal Status:   Nutrition Goals:   Nutrition Interventions/Education:     Exercise Assessment  Current Home Exercise:   Mode:   Days/Week:   Min/Day:     6-minute Walk Assessment:  Intake: 6-MWT distance (meters):     FiO2:     SPO2:  Discharge: 6-MWT distance (meters):      FiO2:     SPO2:    Exercise Prescription:    Based on: {   Frequency:   days/week   Mode:    Duration:  total aerobic minutes   Intensity: RPE        Target HR        Max METS        SpO2 Range  to  %       O2 Flow Rate  to  L/min     Modality METS Load Duration   1 Pre-Exercise      2 Treadmill       3 Nustep 4000       4 Recumbent Cycle       5 Weights       6 Post-Exercise        Resistance Training:   Home Exercise Prescription given:     Goal Status:   Exercise Goals:   Exercise Interventions/Education:     Other Core Components/Risk Factor Assessment:    Medication adherence:  Current Medications:    Current Outpatient Medications   Medication Sig Dispense Refill    albuterol 90 mcg/actuation inhaler Inhale. INHALE 1 TO 2 PUFFS EVERY 4 TO 6 HOURS AS NEEDED.      ascorbic acid (Vitamin C) 1,000 mg tablet Take 1 tablet (1,000 mg) by mouth once daily.      azithromycin (Zithromax) 250 mg tablet Take 2 tabs day 1, take 1 tab day 2-5 6 tablet 0    Breo Ellipta 200-25 mcg/dose inhaler Inhale 1 puff once daily. 60 each 11    calcium citrate-vitamin D3 (Citracal+D) 315 mg-5 mcg (200 unit) tablet Take 1 tablet by mouth once daily.      cetirizine (ZyrTEC) 10 mg tablet Take 1 tablet (10 mg) by mouth once daily. 90 tablet 3    cranberry 400 mg capsule Take 500 mg by mouth.      cyanocobalamin (Vitamin B-12) 500 mcg tablet Take by mouth once every 24 hours.      diclofenac sodium (Aspercreme Arthritis Pain) 1 % gel APPLY TOPICALLY TO  AFFECTED AREA 4 TIMES DAILY AS NEEDED FOR KNEE PAIN 200 g 1    fluticasone (Flonase) 50 mcg/actuation nasal spray Administer 2 sprays into each nostril once daily. 48 g 3    furosemide (Lasix) 40 mg tablet Take by mouth.      guaiFENesin (Mucinex) 600 mg 12 hr tablet Take 2 tablets (1,200 mg) by mouth once daily.      levothyroxine (Synthroid, Levoxyl) 50 mcg tablet TAKE ONE TABLET BY MOUTH EVERY DAY 90 tablet 0    oxybutynin XL (Ditropan-XL) 10 mg 24 hr tablet Take 1 tablet (10 mg) by mouth once daily.      rosuvastatin (Crestor) 10 mg tablet Take 1 tablet (10 mg) by mouth once daily. 100 tablet 0     No current facility-administered medications for this visit.     Allergies:    Allergies   Allergen Reactions    Sulfa (Sulfonamide Antibiotics) Unknown       Taking medications as prescribed:    If no, why:    Uses pill box/organizer:    Carries medication list:     Blood Pressure Management:  Hx of Hypertension:   Resting BP:     Smoking/Tobacco Assessment;    Social History     Tobacco Use   Smoking Status Never    Passive exposure: Never   Smokeless Tobacco Never       Goal Status:   Other Core Component Goals:   Other Core Component Interventions/Education:      # of hospital admissions due to lung problems:   # of ER visits due to lung problems:     Individual Patient Goals:    General Comments:        Rehab Staff Signature: Juhi Andres RRT  Pulmonary Rehabilitation Assessment    Name: Justine Mesa  Medical Record Number: [id number]  YOB: 1950    Today’s Date: 2/6/2025  Primary Care Physician: Odell Sharma MD  Referring Physician:     Program Location:     General  Primary Diagnosis:   Date of Diagnosis:     Session #:     Oxygen Assessment    SP02 rest:  %  SP02 exertion:  %    Oxygen Use  Requires supplemental O2:    Liters at Rest:    Liters with Exertion:   Using O2 as prescribed:      Oxygen (Supplemental 02 use only)  Demonstrates appropriate knowledge of 02 use?   Demonstrates  knowledge of 02 safety?   Home 02 system:  Portable 02:    Hypoxemia managed?:    Home Pulse Oximeter?:      MMRC Survey score:  Intake:   Discharge:     Goal Status:   Oxygen Goals:  Oxygen Interventions:      Psychosocial Assessment    Pt reported/currently experiencing:   Currently seeing a mental health provider:   Social Support:   PHQ-9 Survey Score:   Intake:   Discharge:     CAT Survey Score:  Intake:  Discharge:    Learning assessment/barriers:   Preferred learning method:      Educational Assessment:  Pulmonary Knowledge Test:   Intake: /15  Discharge: /15    Stages of Change:    Goal Status:   Psychosocial Goals:   Psychosocial Interventions/Education:     Nutrition Assessment:    Current Dietary Guidelines:  Barriers to dietary change:     Diet Habit Survey: drop down selection of Picture Your Plate,    Plate:    Intake:   Discharge:     Diabetes Assessment    Diabetes:   Medication:   Last Hemoglobin A1C:     Last Hemoglobin A1C date:   Pt monitors BS at home:   Frequency:   FBS range:   Hypoglycemic Episodes:     Weight Management    Height:   Weight:   BMI:    Goal Status:   Nutrition Goals:   Nutrition Interventions/Education:     Exercise Assessment  Current Home Exercise:   Mode:   Days/Week:   Min/Day:     6-minute Walk Assessment:  Intake: 6-MWT distance (meters):     FiO2:     SPO2:  Discharge: 6-MWT distance (meters):      FiO2:     SPO2:    Exercise Prescription:    Based on: {   Frequency:   days/week   Mode:    Duration:  total aerobic minutes   Intensity: RPE        Target HR        Max METS        SpO2 Range  to  %       O2 Flow Rate  to  L/min     Modality METS Load Duration   1 Pre-Exercise      2 Treadmill       3 Nustep 4000       4 Recumbent Cycle       5 Weights       6 Post-Exercise        Resistance Training:   Home Exercise Prescription given:     Goal Status:   Exercise Goals:   Exercise Interventions/Education:     Other Core Components/Risk Factor Assessment:    Medication  adherence:  Current Medications:    Current Outpatient Medications   Medication Sig Dispense Refill    albuterol 90 mcg/actuation inhaler Inhale. INHALE 1 TO 2 PUFFS EVERY 4 TO 6 HOURS AS NEEDED.      ascorbic acid (Vitamin C) 1,000 mg tablet Take 1 tablet (1,000 mg) by mouth once daily.      azithromycin (Zithromax) 250 mg tablet Take 2 tabs day 1, take 1 tab day 2-5 6 tablet 0    azithromycin (Zithromax) 500 mg tablet Take 1 tablet (500 mg) by mouth once daily for 5 days. 5 tablet 0    Breo Ellipta 200-25 mcg/dose inhaler Inhale 1 puff once daily. 60 each 11    calcium citrate-vitamin D3 (Citracal+D) 315 mg-5 mcg (200 unit) tablet Take 1 tablet by mouth once daily.      cetirizine (ZyrTEC) 10 mg tablet Take 1 tablet (10 mg) by mouth once daily. 90 tablet 3    cranberry 400 mg capsule Take 500 mg by mouth.      cyanocobalamin (Vitamin B-12) 500 mcg tablet Take by mouth once every 24 hours.      diclofenac sodium (Aspercreme Arthritis Pain) 1 % gel APPLY TOPICALLY TO AFFECTED AREA 4 TIMES DAILY AS NEEDED FOR KNEE PAIN 200 g 1    fluticasone (Flonase) 50 mcg/actuation nasal spray Administer 2 sprays into each nostril once daily. 48 g 3    furosemide (Lasix) 40 mg tablet Take by mouth.      guaiFENesin (Mucinex) 600 mg 12 hr tablet Take 2 tablets (1,200 mg) by mouth once daily.      guaiFENesin (Mucinex) 600 mg 12 hr tablet Take 1 tablet (600 mg) by mouth 2 times a day for 5 days. Do not crush, chew, or split. 10 tablet 0    levothyroxine (Synthroid, Levoxyl) 50 mcg tablet TAKE ONE TABLET BY MOUTH EVERY DAY 90 tablet 0    oxybutynin XL (Ditropan-XL) 10 mg 24 hr tablet Take 1 tablet (10 mg) by mouth once daily.      predniSONE (Deltasone) 20 mg tablet Take 2 tablets (40 mg) by mouth once daily for 5 days. 10 tablet 0    rosuvastatin (Crestor) 10 mg tablet Take 1 tablet (10 mg) by mouth once daily. 100 tablet 0     No current facility-administered medications for this visit.     Allergies:    Allergies   Allergen  Reactions    Sulfa (Sulfonamide Antibiotics) Unknown       Taking medications as prescribed:    If no, why:    Uses pill box/organizer:    Carries medication list:     Blood Pressure Management:  Hx of Hypertension:   Resting BP:     Smoking/Tobacco Assessment;    Social History     Tobacco Use   Smoking Status Never    Passive exposure: Never   Smokeless Tobacco Never       Goal Status:   Other Core Component Goals:   Other Core Component Interventions/Education:      # of hospital admissions due to lung problems:   # of ER visits due to lung problems:     Individual Patient Goals:    General Comments:        Rehab Staff Signature: Juhi Andres, RRT

## 2025-02-10 ENCOUNTER — APPOINTMENT (OUTPATIENT)
Dept: CARDIAC REHAB | Facility: HOSPITAL | Age: 75
End: 2025-02-10
Payer: MEDICARE

## 2025-02-12 ENCOUNTER — APPOINTMENT (OUTPATIENT)
Dept: CARDIAC REHAB | Facility: HOSPITAL | Age: 75
End: 2025-02-12
Payer: MEDICARE

## 2025-02-17 ENCOUNTER — APPOINTMENT (OUTPATIENT)
Dept: CARDIAC REHAB | Facility: HOSPITAL | Age: 75
End: 2025-02-17
Payer: MEDICARE

## 2025-02-24 PROBLEM — S46.811A STRAIN OF RIGHT SUBSCAPULARIS MUSCLE: Status: RESOLVED | Noted: 2023-02-02 | Resolved: 2025-02-24

## 2025-02-24 PROBLEM — R05.9 COUGH: Status: RESOLVED | Noted: 2023-02-02 | Resolved: 2025-02-24

## 2025-02-24 PROBLEM — M54.50 LOWER BACK PAIN: Status: RESOLVED | Noted: 2023-02-02 | Resolved: 2025-02-24

## 2025-02-24 PROBLEM — R11.0 NAUSEA: Status: RESOLVED | Noted: 2023-02-02 | Resolved: 2025-02-24

## 2025-02-24 PROBLEM — R10.13 DYSPEPSIA: Status: RESOLVED | Noted: 2023-02-02 | Resolved: 2025-02-24

## 2025-02-24 PROBLEM — R30.0 DYSURIA: Status: RESOLVED | Noted: 2023-02-02 | Resolved: 2025-02-24

## 2025-02-24 PROBLEM — D64.9 LOW HEMOGLOBIN: Status: RESOLVED | Noted: 2023-02-02 | Resolved: 2025-02-24

## 2025-02-24 PROBLEM — R06.09 DYSPNEA ON EXERTION: Status: RESOLVED | Noted: 2024-05-14 | Resolved: 2025-02-24

## 2025-02-24 PROBLEM — J30.9 ALLERGIC RHINITIS: Status: RESOLVED | Noted: 2023-02-02 | Resolved: 2025-02-24

## 2025-02-24 PROBLEM — J02.9 SORE THROAT: Status: RESOLVED | Noted: 2023-02-02 | Resolved: 2025-02-24

## 2025-02-24 PROBLEM — R06.83 SNORING: Status: RESOLVED | Noted: 2023-02-02 | Resolved: 2025-02-24

## 2025-02-24 PROBLEM — K25.0 ACUTE GASTRIC ULCER WITH HEMORRHAGE: Status: RESOLVED | Noted: 2023-02-02 | Resolved: 2025-02-24

## 2025-02-24 PROBLEM — R79.0 LOW FERRITIN LEVEL: Status: RESOLVED | Noted: 2023-02-02 | Resolved: 2025-02-24

## 2025-02-24 PROBLEM — S62.524A CLOSED NONDISPLACED FRACTURE OF DISTAL PHALANX OF RIGHT THUMB: Status: RESOLVED | Noted: 2023-02-02 | Resolved: 2025-02-24

## 2025-02-24 PROBLEM — R26.9 GAIT DISTURBANCE: Status: RESOLVED | Noted: 2023-02-02 | Resolved: 2025-02-24

## 2025-02-24 PROBLEM — D64.9 ANEMIA OF UNKNOWN ETIOLOGY: Status: RESOLVED | Noted: 2023-02-02 | Resolved: 2025-02-24

## 2025-02-24 PROBLEM — J34.89 NASAL CRUSTING: Status: RESOLVED | Noted: 2023-02-02 | Resolved: 2025-02-24

## 2025-02-24 ASSESSMENT — ENCOUNTER SYMPTOMS
CARDIOVASCULAR NEGATIVE: 1
MUSCULOSKELETAL NEGATIVE: 1
NEUROLOGICAL NEGATIVE: 1
ENDOCRINE NEGATIVE: 1
GASTROINTESTINAL NEGATIVE: 1
PSYCHIATRIC NEGATIVE: 1
EYES NEGATIVE: 1

## 2025-02-24 NOTE — PROGRESS NOTES
Patient ID:   Justine Mesa is a 74 y.o. female with PMH remarkable for interstitial lung disease, osteopenia, hernia repair, hypothyroidism, ID Anemia who presents to the office today for Medicare Annual Wellness Visit Subsequent.    HEALTH MAINTENANCE: Annual Medicare Wellness Physical  Last Office Visit: 7/12/2024 for 2m FU. Lab work was ordered. It was suggested to start a statin but pt declined.  -- seen in the ER 1/29/2025 for SOB, chest pain x3 days. +ve for flu A. ACS ruled out. Toradol was given for headache.  -- pt called the office 2/3/2025 with no imp in s/sx. Given zithromax, prednisone and mucinex.  Mammogram (40-75): 7/11/2023 -ve --> DUE  Pap smear (21-65, or hysterectomy q5yrs): h/o hysterectomy  Last Labs: 7/15/2024 full panel. Had some labs in ER on 1/29/2025.  -- declined starting statin  Colonoscopy (45-75 or age 40 with 1st degree relative dx colon ca): 9/25/2020 with Dr White which showed diverticulosis.  Lung cancer screening (50-76 y/o + 20 pack year + smoking/quit in last 15 years):  1/29/2025 done in ER showing 5mm nodule in left lung base, similar to prior.  - also noted, prominence of main pulmonary artery, correlate with concern for pulmonary HTN. Cardiomegaly and aortic calcification.  DEXA (65+, q 2 years): 2/28/2023 showing osteopenia of multiple areas.   Echo 5/28/2024 showed LVSF 60-65%, impaired relaxation.    Following Providers:  Pulm: Dr Alicia Gregory MD  DPM: Dr Hays  Ortho: Dr Triana  Cardio: Dr Roach    Reports that she imp with the atb but she does have a cough again.  Denies bowel and/or bladder issues.   Has edema in BLE but using SCDs with improvement.   present today with patient.     Past Medical History:   Diagnosis Date    Accidental fall 05/14/2024    Acute bronchitis, unspecified     Acute bronchitis, unspecified organism    Acute frontal sinusitis, unspecified 01/06/2020    Acute frontal sinusitis    Acute frontal sinusitis, unspecified  03/07/2018    Acute frontal sinusitis    Acute gastroenteritis 05/14/2024    Acute maxillary sinusitis, unspecified 01/06/2020    Acute maxillary sinusitis    Acute maxillary sinusitis, unspecified 11/09/2017    Acute maxillary antritis    Acute tonsillitis due to other specified organisms     Acute tonsillitis due to other specified organisms    Acute tonsillitis due to other specified organisms 01/31/2017    Acute tonsillitis due to other specified organisms    Acute upper respiratory infection, unspecified 04/18/2017    Viral URI with cough    Acute upper respiratory infection, unspecified 06/07/2017    Acute URI    Chronic frontal sinusitis 03/07/2018    Frontal sinusitis    Contact with and (suspected) exposure to covid-19 01/24/2023    Diaphragmatic hernia without obstruction or gangrene 06/13/2022    Paraesophageal hiatal hernia    Hypothyroidism, unspecified 01/04/2022    Hypothyroidism    Left sided abdominal pain 05/14/2024    Pain in both feet 05/14/2024    Pain in unspecified lower leg 07/16/2019    Calf pain    Personal history of other diseases of the digestive system 07/11/2022    History of gastroesophageal reflux (GERD)    Personal history of other diseases of the digestive system     History of gastroesophageal reflux (GERD)    Personal history of other diseases of the digestive system     History of hemorrhoids    Personal history of other diseases of the respiratory system 03/07/2018    History of sore throat    Personal history of other diseases of the respiratory system 11/09/2017    History of acute pharyngitis    Personal history of other diseases of the respiratory system 11/21/2017    History of acute bronchitis    Personal history of other diseases of the respiratory system 03/07/2018    History of acute pharyngitis    Personal history of other specified conditions     History of dizziness    Personal history of urinary calculi     Personal history of renal calculi    Pneumonia 01/24/2023       Past Surgical History:   Procedure Laterality Date    GALLBLADDER SURGERY  10/11/2020    Gallbladder Surgery    HYSTERECTOMY  09/07/2020    Hysterectomy    OTHER SURGICAL HISTORY  05/04/2021    Colonoscopy    OTHER SURGICAL HISTORY  10/11/2020    Esophagogastroduodenoscopy      Social History     Tobacco Use    Smoking status: Never     Passive exposure: Never    Smokeless tobacco: Never   Substance Use Topics    Alcohol use: Yes     Comment: occasional    Drug use: Never     Family History   Problem Relation Name Age of Onset    Other (diabetes mellitus) Mother      Hypertension Mother      Hypertension Father        Immunization History   Administered Date(s) Administered    Flu vaccine, quadrivalent, high-dose, preservative free, age 65y+ (FLUZONE) 02/02/2022, 10/22/2022, 11/20/2023    Flu vaccine, trivalent, preservative free, HIGH-DOSE, age 65y+ (Fluzone) 09/25/2020, 02/02/2022, 10/22/2022, 11/20/2023    Influenza, seasonal, injectable 10/22/2022    Influenza, trivalent, adjuvanted 09/30/2024    Moderna COVID-19 vaccine, 12 years and older (50mcg/0.5mL)(Spikevax) 12/12/2023    Pfizer COVID-19 vaccine, 12 years and older, (30mcg/0.3mL) (Comirnaty) 09/30/2024    Pfizer Gray Cap SARS-CoV-2 07/08/2022    Pfizer Purple Cap SARS-CoV-2 01/07/2021, 01/28/2021    Pneumococcal conjugate vaccine, 13-valent (PREVNAR 13) 10/20/2017    Pneumococcal polysaccharide vaccine, 23-valent, age 2 years and older (PNEUMOVAX 23) 01/14/2019    RESPIRATORY SYNCYTIAL VIRUS (RSV), ELIGIBLE PREGNANT PTS, 0.5 ML (ABRYSVO) 09/09/2024    SARS-CoV-2, Unspecified 07/08/2022     Review of Systems   Constitutional:  Positive for fatigue.   HENT:  Positive for rhinorrhea.    Eyes: Negative.    Respiratory:  Positive for cough.    Cardiovascular: Negative.    Gastrointestinal: Negative.    Endocrine: Negative.    Genitourinary: Negative.    Musculoskeletal: Negative.    Skin: Negative.    Neurological: Negative.    Psychiatric/Behavioral:  "Negative.     All other systems reviewed and are negative.    Allergies   Allergen Reactions    Sulfa (Sulfonamide Antibiotics) Unknown     Visit Vitals  /76 (BP Location: Left arm, Patient Position: Sitting)   Pulse 66   Resp 18   Ht 1.626 m (5' 4\")   Wt 93 kg (205 lb)   SpO2 94%   BMI 35.19 kg/m²   OB Status Postmenopausal   Smoking Status Never   BSA 2.05 m²     Physical Exam  Vitals reviewed. Exam conducted with a chaperone present.   Constitutional:       Appearance: Normal appearance. She is well-developed.   HENT:      Head: Normocephalic.      Right Ear: External ear normal.      Left Ear: External ear normal.      Nose: Rhinorrhea present.      Mouth/Throat:      Lips: Pink.      Mouth: Mucous membranes are moist.   Eyes:      General: Lids are normal.      Pupils: Pupils are equal, round, and reactive to light.   Neck:      Trachea: Trachea normal.   Cardiovascular:      Rate and Rhythm: Normal rate and regular rhythm.      Heart sounds: Normal heart sounds.   Pulmonary:      Effort: Pulmonary effort is normal.      Breath sounds: Decreased breath sounds present.   Abdominal:      General: Bowel sounds are normal.      Palpations: Abdomen is soft.   Musculoskeletal:      Cervical back: Full passive range of motion without pain.   Skin:     General: Skin is warm and moist.   Neurological:      General: No focal deficit present.      Mental Status: She is alert and oriented to person, place, and time. Mental status is at baseline.   Psychiatric:         Attention and Perception: Attention normal.         Mood and Affect: Mood normal.         Speech: Speech normal.         Behavior: Behavior is cooperative.         Thought Content: Thought content normal.         Cognition and Memory: Cognition normal.         Judgment: Judgment normal.       Current Outpatient Medications   Medication Instructions    albuterol 90 mcg/actuation inhaler Inhale. INHALE 1 TO 2 PUFFS EVERY 4 TO 6 HOURS AS NEEDED.    " ascorbic acid (Vitamin C) 1,000 mg tablet 1 tablet, Daily    azithromycin (Zithromax) 250 mg tablet Take 2 tabs day 1, take 1 tab day 2-5    Breo Ellipta 200-25 mcg/dose inhaler 1 puff, inhalation, Daily    calcium citrate-vitamin D3 (Citracal+D) 315 mg-5 mcg (200 unit) tablet 1 tablet, Daily    cetirizine (ZYRTEC) 10 mg, oral, Daily    cranberry fruit 500 mg    diclofenac sodium (Aspercreme Arthritis Pain) 1 % gel APPLY TOPICALLY TO AFFECTED AREA 4 TIMES DAILY AS NEEDED FOR KNEE PAIN    fluticasone (Flonase) 50 mcg/actuation nasal spray 2 sprays, Each Nostril, Daily    furosemide (Lasix) 40 mg tablet Take by mouth.    guaiFENesin (MUCINEX) 1,200 mg, Daily    levothyroxine (SYNTHROID, LEVOXYL) 50 mcg, oral, Daily    oxybutynin XL (DITROPAN-XL) 10 mg, Daily    rosuvastatin (CRESTOR) 10 mg, oral, Daily     Lab Results   Component Value Date    WBC 13.3 (H) 01/29/2025    HGB 15.5 01/29/2025    HCT 47.7 (H) 01/29/2025     01/29/2025    CHOL 243 (H) 07/15/2024    TRIG 193 (H) 07/15/2024    HDL 48.8 07/15/2024    ALT 10 01/29/2025    AST 15 01/29/2025     01/29/2025    K 3.5 01/29/2025     01/29/2025    CREATININE 0.75 01/29/2025    BUN 12 01/29/2025    CO2 28 01/29/2025    TSH 1.62 07/15/2024    INR 1.1 01/24/2023    HGBA1C 5.4 09/24/2020       Problem List Items Addressed This Visit             ICD-10-CM    Asthmatic bronchitis ([facility]) J45.909     - c/w inhaler  - FU with pharmMD         Hypothyroidism E03.9     - c/w synthroid  - reviewed last thyroid panel  - Take levothyroxine on an empty stomach with water alone, 1 hour before eating or taking other medications, 4 hours before any calcium or iron supplement.   - Hold Biotin (all B vitamins, B Complex) for 2 days before any blood draw         Lung nodules R91.8     Lung cancer screening (50-78 y/o + 20 pack year + smoking/quit in last 15 years):  1/29/2025 done in ER showing 5mm nodule in left lung base, similar to prior.  - also noted,  prominence of main pulmonary artery, correlate with concern for pulmonary HTN. Cardiomegaly and aortic calcification.         Encounter for annual wellness visit (AWV) in Medicare patient Z00.00     - due for mammogram         Interstitial lung disease (Multi) J84.9     - c/w inhalers  - FU with haven Harper as scheduled         Gastroesophageal reflux disease K21.9     - c/w PPI         Relevant Medications    omeprazole (PriLOSEC) 40 mg DR capsule    Myalgia due to statin M79.10, T46.6X5A     - declines starting d/t myalgia and side effects          Other Visit Diagnoses         Codes    Visit for screening mammogram     Z12.31    Relevant Orders    BI mammo bilateral screening tomosynthesis          --------------------  Written by Cee Gillette RN, acting as a scribe for Dr. Lopez. This note accurately reflects the work and decisions made by Dr. Lopez.     I, Dr. Lopez, attest all medical record entries made by the scribe were under my direction and were personally dictated by me. I have reviewed the chart and agree that the record accurately reflects my performance of the history, physical exam, and assessment and plan.

## 2025-02-25 ENCOUNTER — APPOINTMENT (OUTPATIENT)
Dept: PODIATRY | Facility: CLINIC | Age: 75
End: 2025-02-25
Payer: COMMERCIAL

## 2025-02-25 ENCOUNTER — APPOINTMENT (OUTPATIENT)
Dept: PRIMARY CARE | Facility: CLINIC | Age: 75
End: 2025-02-25
Payer: MEDICARE

## 2025-02-25 VITALS
OXYGEN SATURATION: 94 % | RESPIRATION RATE: 18 BRPM | DIASTOLIC BLOOD PRESSURE: 76 MMHG | BODY MASS INDEX: 35 KG/M2 | WEIGHT: 205 LBS | SYSTOLIC BLOOD PRESSURE: 123 MMHG | HEART RATE: 66 BPM | HEIGHT: 64 IN

## 2025-02-25 DIAGNOSIS — E03.9 HYPOTHYROIDISM, UNSPECIFIED TYPE: ICD-10-CM

## 2025-02-25 DIAGNOSIS — J45.909 ASTHMATIC BRONCHITIS WITHOUT COMPLICATION, UNSPECIFIED ASTHMA SEVERITY, UNSPECIFIED WHETHER PERSISTENT (HHS-HCC): ICD-10-CM

## 2025-02-25 DIAGNOSIS — M79.672 PAIN IN BOTH FEET: ICD-10-CM

## 2025-02-25 DIAGNOSIS — K21.9 GASTROESOPHAGEAL REFLUX DISEASE WITHOUT ESOPHAGITIS: ICD-10-CM

## 2025-02-25 DIAGNOSIS — Z00.00 ENCOUNTER FOR ANNUAL WELLNESS VISIT (AWV) IN MEDICARE PATIENT: ICD-10-CM

## 2025-02-25 DIAGNOSIS — B35.1 ONYCHOMYCOSIS OF TOENAIL: ICD-10-CM

## 2025-02-25 DIAGNOSIS — R60.0 EDEMA OF LOWER EXTREMITY: ICD-10-CM

## 2025-02-25 DIAGNOSIS — M79.676 PAIN AROUND TOENAIL: Primary | ICD-10-CM

## 2025-02-25 DIAGNOSIS — M79.671 PAIN IN BOTH FEET: ICD-10-CM

## 2025-02-25 DIAGNOSIS — R91.8 LUNG NODULES: ICD-10-CM

## 2025-02-25 DIAGNOSIS — J84.9 INTERSTITIAL LUNG DISEASE (MULTI): ICD-10-CM

## 2025-02-25 DIAGNOSIS — T46.6X5A MYALGIA DUE TO STATIN: ICD-10-CM

## 2025-02-25 DIAGNOSIS — Z12.31 VISIT FOR SCREENING MAMMOGRAM: ICD-10-CM

## 2025-02-25 DIAGNOSIS — M79.10 MYALGIA DUE TO STATIN: ICD-10-CM

## 2025-02-25 PROCEDURE — 1126F AMNT PAIN NOTED NONE PRSNT: CPT | Performed by: INTERNAL MEDICINE

## 2025-02-25 PROCEDURE — G0439 PPPS, SUBSEQ VISIT: HCPCS | Performed by: INTERNAL MEDICINE

## 2025-02-25 PROCEDURE — 1170F FXNL STATUS ASSESSED: CPT | Performed by: INTERNAL MEDICINE

## 2025-02-25 PROCEDURE — 99213 OFFICE O/P EST LOW 20 MIN: CPT | Performed by: INTERNAL MEDICINE

## 2025-02-25 PROCEDURE — 3008F BODY MASS INDEX DOCD: CPT | Performed by: INTERNAL MEDICINE

## 2025-02-25 PROCEDURE — 1159F MED LIST DOCD IN RCRD: CPT | Performed by: INTERNAL MEDICINE

## 2025-02-25 PROCEDURE — 11721 DEBRIDE NAIL 6 OR MORE: CPT | Performed by: PODIATRIST

## 2025-02-25 PROCEDURE — 1036F TOBACCO NON-USER: CPT | Performed by: INTERNAL MEDICINE

## 2025-02-25 PROCEDURE — 1160F RVW MEDS BY RX/DR IN RCRD: CPT | Performed by: INTERNAL MEDICINE

## 2025-02-25 PROCEDURE — 1157F ADVNC CARE PLAN IN RCRD: CPT | Performed by: INTERNAL MEDICINE

## 2025-02-25 RX ORDER — OMEPRAZOLE 40 MG/1
40 CAPSULE, DELAYED RELEASE ORAL
Qty: 90 CAPSULE | Refills: 1 | Status: SHIPPED | OUTPATIENT
Start: 2025-02-25

## 2025-02-25 ASSESSMENT — ACTIVITIES OF DAILY LIVING (ADL)
DRESSING: INDEPENDENT
BATHING: INDEPENDENT
MANAGING_FINANCES: INDEPENDENT
TAKING_MEDICATION: INDEPENDENT
GROCERY_SHOPPING: INDEPENDENT
DOING_HOUSEWORK: INDEPENDENT

## 2025-02-25 ASSESSMENT — PAIN SCALES - GENERAL: PAINLEVEL_OUTOF10: 0-NO PAIN

## 2025-02-25 NOTE — ASSESSMENT & PLAN NOTE
- c/w synthroid  - reviewed last thyroid panel  - Take levothyroxine on an empty stomach with water alone, 1 hour before eating or taking other medications, 4 hours before any calcium or iron supplement.   - Hold Biotin (all B vitamins, B Complex) for 2 days before any blood draw

## 2025-02-25 NOTE — PATIENT INSTRUCTIONS
It was nice to see you in the office today!  As discussed during our visit...     START on omeprazole 40mg daily in the morning.  Let Dr Roach know about the CT chest 1/29/2025.  --------------------  Dr Lopez has ordered a mammogram for you to obtain. This will take approximately 30 minutes. You can schedule this on the [facility] website or through the link below.    Breast cancer cannot be prevented but early detection can keep the disease from spreading and greatly increase your odds for a favorable outcome.    Patients with dense breast tissue, those who have an average or mildly elevated risk for developing breast cancer or those who have a family history of the disease can schedule a low-cost breast cancer screening that takes approximately 10 minutes.   This supplemental screening is self-pay ($250*) and is available at several  locations that perform breast MRIs.   To learn more about the Fast MRI program, call [phone].    Website with more information and access to schedule online: Mammogram Info  [email or web address]

## 2025-02-25 NOTE — PROGRESS NOTES
History of Present Illness:     Patient states they are here for help trimming nails  Denies trauma  Unable to safely trim on own  Has swelling in legs, lymphedema. Wear compression hose daily  No other pedal complaints    Past Medical History  Past Medical History:   Diagnosis Date    Acute bronchitis, unspecified     Acute bronchitis, unspecified organism    Acute frontal sinusitis, unspecified 01/06/2020    Acute frontal sinusitis    Acute frontal sinusitis, unspecified 03/07/2018    Acute frontal sinusitis    Acute maxillary sinusitis, unspecified 01/06/2020    Acute maxillary sinusitis    Acute maxillary sinusitis, unspecified 11/09/2017    Acute maxillary antritis    Acute tonsillitis due to other specified organisms     Acute tonsillitis due to other specified organisms    Acute tonsillitis due to other specified organisms 01/31/2017    Acute tonsillitis due to other specified organisms    Acute upper respiratory infection, unspecified 04/18/2017    Viral URI with cough    Acute upper respiratory infection, unspecified 06/07/2017    Acute URI    Chronic frontal sinusitis 03/07/2018    Frontal sinusitis    Diaphragmatic hernia without obstruction or gangrene 06/13/2022    Paraesophageal hiatal hernia    Hypothyroidism, unspecified 01/04/2022    Hypothyroidism    Pain in unspecified lower leg 07/16/2019    Calf pain    Personal history of other diseases of the digestive system 07/11/2022    History of gastroesophageal reflux (GERD)    Personal history of other diseases of the digestive system     History of gastroesophageal reflux (GERD)    Personal history of other diseases of the digestive system     History of hemorrhoids    Personal history of other diseases of the respiratory system 03/07/2018    History of sore throat    Personal history of other diseases of the respiratory system 11/09/2017    History of acute pharyngitis    Personal history of other diseases of the respiratory system 11/21/2017     History of acute bronchitis    Personal history of other diseases of the respiratory system 03/07/2018    History of acute pharyngitis    Personal history of other specified conditions     History of dizziness    Personal history of urinary calculi     Personal history of renal calculi       Medications and Allergies have been reviewed.    Review Of Systems:  GENERAL: No weight loss, malaise or fevers.  HEENT: Negative for frequent or significant headaches,   RESPIRATORY: Negative for cough, wheezing or shortness of breath.  CARDIOVASCULAR: Negative for chest pain, leg swelling or palpitations.    Examination of Both Lower Extremities:   Vascular exam: Dorsalis pedis and Posterior Tibial pulses palpable 2/4 bilateral. Capillary refill time less than 3 seconds b/l. No Hair growth noted to digits. Mild +1 non pitting edema noted. Skin temp warm to warm from proximal to distal b/l. No varicosities noted.     Neurologic exam: Vibratory, protective and proprioception intact b/l. No neurologic deficits noted.      Musculoskeletal exam: Muscle strength 5/5 for all major muscle groups tested in the lower extremity b/l.      Dermatologic exam: Nails 1-5 b/l discolored and elongated. No SOI noted Webspaces 1-4 b/l are clean, dry and intact. No primary or secondary skin lesions noted. No open lesions or wounds noted at this time .    1. Pain around toenail        2. Edema of lower extremity        3. Onychomycosis of toenail        4. Pain in both feet          Patient exam and eval  Nails debrided  Monitor swelling  Recommend compression socks - diligent in wearing  No other pedal concerns  Fu prn  Patient was in agreement to this plan. All questions answered.    PCP Dr Lopez 2/25/2025    Dennise Hays DPM  [phone]  [address]  Fax: [phone]

## 2025-02-26 ENCOUNTER — APPOINTMENT (OUTPATIENT)
Dept: CARDIAC REHAB | Facility: HOSPITAL | Age: 75
End: 2025-02-26
Payer: MEDICARE

## 2025-02-27 PROBLEM — M79.10 MYALGIA DUE TO STATIN: Status: ACTIVE | Noted: 2025-02-27

## 2025-02-27 PROBLEM — T46.6X5A MYALGIA DUE TO STATIN: Status: ACTIVE | Noted: 2025-02-27

## 2025-02-27 ASSESSMENT — ENCOUNTER SYMPTOMS
COUGH: 1
RHINORRHEA: 1
FATIGUE: 1

## 2025-02-27 NOTE — ASSESSMENT & PLAN NOTE
Lung cancer screening (50-76 y/o + 20 pack year + smoking/quit in last 15 years):  1/29/2025 done in ER showing 5mm nodule in left lung base, similar to prior.  - also noted, prominence of main pulmonary artery, correlate with concern for pulmonary HTN. Cardiomegaly and aortic calcification.

## 2025-03-02 DIAGNOSIS — E03.9 HYPOTHYROIDISM, UNSPECIFIED TYPE: ICD-10-CM

## 2025-03-03 ENCOUNTER — CLINICAL SUPPORT (OUTPATIENT)
Dept: CARDIAC REHAB | Facility: HOSPITAL | Age: 75
End: 2025-03-03
Payer: MEDICARE

## 2025-03-03 DIAGNOSIS — J84.9 INTERSTITIAL LUNG DISEASE (MULTI): Primary | ICD-10-CM

## 2025-03-03 PROCEDURE — 94626 PHY/QHP OP PULM RHB W/MNTR: CPT | Performed by: PEDIATRICS

## 2025-03-03 RX ORDER — LEVOTHYROXINE SODIUM 50 UG/1
50 TABLET ORAL DAILY
Qty: 90 TABLET | Refills: 0 | Status: SHIPPED | OUTPATIENT
Start: 2025-03-03

## 2025-03-03 NOTE — PROGRESS NOTES
Pulmonary Daily Rehabilitation Assessment    Name: Justine Mesa  Medical Record Number: [id number]  YOB: 1950    Today’s Date: 3/3/2025  Primary Care Physician: Odell Sharma MD    Session #: 8    Oxygen Assessment    SP02 rest: 99 %  SP02 exertion: 96 %    Oxygen Use  Requires supplemental O2:    Liters at Rest: ra   Liters with Exertion: ra  Using O2 as prescribed: na      Weight Management    Height: 5'4  Weight: 205.5  BMI:        Daily Activity Log   Modality METS Load Duration   1 Pre-Exercise6/0      2 Treadmill 11/2 1.9 1.2 12:00   3 Nustep 4000   11/2 3.1-3.4 6.0 15:00   4 Recumbent Cycle       5 Weights       6 UBE      7 Rower      8 Aero Bike  11/2 W36-54 Rpm26- 31 13:00   9 Cardio-fit      10 Post-Exercise6/0            Other Core Components/Risk Factor Assessment:    Blood Pressure Management:  Hx of Hypertension: no  Resting BP: 123/71  POST BP: 110/73      General Comments:  Patient declined education on sleep- 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

## 2025-03-04 ENCOUNTER — APPOINTMENT (OUTPATIENT)
Dept: OTOLARYNGOLOGY | Facility: CLINIC | Age: 75
End: 2025-03-04
Payer: MEDICARE

## 2025-03-04 ENCOUNTER — HOSPITAL ENCOUNTER (OUTPATIENT)
Dept: RADIOLOGY | Facility: HOSPITAL | Age: 75
Discharge: HOME | End: 2025-03-04
Payer: MEDICARE

## 2025-03-04 VITALS — WEIGHT: 195 LBS | HEIGHT: 64 IN | BODY MASS INDEX: 33.29 KG/M2

## 2025-03-04 DIAGNOSIS — Z12.31 VISIT FOR SCREENING MAMMOGRAM: ICD-10-CM

## 2025-03-04 PROCEDURE — 77067 SCR MAMMO BI INCL CAD: CPT | Performed by: STUDENT IN AN ORGANIZED HEALTH CARE EDUCATION/TRAINING PROGRAM

## 2025-03-04 PROCEDURE — 77067 SCR MAMMO BI INCL CAD: CPT

## 2025-03-04 PROCEDURE — 77063 BREAST TOMOSYNTHESIS BI: CPT | Performed by: STUDENT IN AN ORGANIZED HEALTH CARE EDUCATION/TRAINING PROGRAM

## 2025-03-05 ENCOUNTER — CLINICAL SUPPORT (OUTPATIENT)
Dept: CARDIAC REHAB | Facility: HOSPITAL | Age: 75
End: 2025-03-05
Payer: MEDICARE

## 2025-03-05 DIAGNOSIS — J84.9 INTERSTITIAL LUNG DISEASE (MULTI): Primary | ICD-10-CM

## 2025-03-05 PROCEDURE — 94626 PHY/QHP OP PULM RHB W/MNTR: CPT | Performed by: PEDIATRICS

## 2025-03-05 NOTE — PROGRESS NOTES
Pulmonary Daily Rehabilitation Assessment    Name: Justine Mesa  Medical Record Number: [id number]  YOB: 1950    Today’s Date: 3/5/2025  Primary Care Physician: Odell Sharma MD    Session #: 9    Oxygen Assessment    SP02 rest: 97 %  SP02 exertion: 95 %    Oxygen Use  Requires supplemental O2:    Liters at Rest: ra   Liters with Exertion: ra  Using O2 as prescribed: na      Weight Management    Height: 5'4  Weight: 204lbs  BMI:        Daily Activity Log   Modality METS Load Duration   1 Pre-Exercise 6/0      2 Treadmill       3 Nustep 4000 scifit 11/1 W15-17 4.5 15:00   4 Recumbent Cycle       5 Weights       6 UBE  11/2 W5-10 Rpm64- 65 13:00   7 Rower  11/2 w42 Mph2.4 8:00   8 Aero Bike      9 Cardio-fit      10 Post-Exercise 6/0            Other Core Components/Risk Factor Assessment:    Blood Pressure Management:  Hx of Hypertension: no  Resting BP: 122/78  POST BP: 112/71      General Comments:  Patient has been doing well since being sick with the FLUa+-States she will have eye surgery in April - 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

## 2025-03-10 ENCOUNTER — CLINICAL SUPPORT (OUTPATIENT)
Dept: CARDIAC REHAB | Facility: HOSPITAL | Age: 75
End: 2025-03-10
Payer: MEDICARE

## 2025-03-10 DIAGNOSIS — J84.9 ILD (INTERSTITIAL LUNG DISEASE) (MULTI): Primary | ICD-10-CM

## 2025-03-10 PROCEDURE — 94626 PHY/QHP OP PULM RHB W/MNTR: CPT | Performed by: PEDIATRICS

## 2025-03-10 NOTE — PROGRESS NOTES
Pulmonary Daily Rehabilitation Assessment    Name: Justine Mesa  Medical Record Number: [id number]  YOB: 1950    Today’s Date: 3/10/2025  Primary Care Physician: Odell Sharma MD    Session #: 11    Oxygen Assessment    SP02 rest: 97 %  SP02 exertion: 96 %    Oxygen Use  Requires supplemental O2:    Liters at Rest: ar   Liters with Exertion: ra  Using O2 as prescribed: na      Weight Management    Height: 5'4  Weight: 206lbs  BMI:        Daily Activity Log   Modality METS Load Duration   1 Pre-Exercise6/0      2 Treadmill   11/2 1.9 1.3 13:00   3 Nustep 4000 11/2 3.0-3.6 6.0 16:00   4 Recumbent Cycle       5 Weights       6 UBE      7 Rower      8 Aero Bike  11/2 W33-47 Rpm26- 36 13:00   9 Cardio-fit      10 Post-Exercise6/0            Other Core Components/Risk Factor Assessment:    Blood Pressure Management:  Hx of Hypertension: no  Resting BP: 115/65  POST BP: 121/78      General Comments:  Patient declined education - 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

## 2025-03-12 ENCOUNTER — CLINICAL SUPPORT (OUTPATIENT)
Dept: CARDIAC REHAB | Facility: HOSPITAL | Age: 75
End: 2025-03-12
Payer: MEDICARE

## 2025-03-12 DIAGNOSIS — J84.9 ILD (INTERSTITIAL LUNG DISEASE) (MULTI): Primary | ICD-10-CM

## 2025-03-12 PROCEDURE — 94626 PHY/QHP OP PULM RHB W/MNTR: CPT | Performed by: PEDIATRICS

## 2025-03-13 NOTE — PROGRESS NOTES
Pulmonary Daily Rehabilitation Assessment    Name: Justine Mesa  Medical Record Number: [id number]  YOB: 1950    Today’s Date: 3/12/2025  Primary Care Physician: Odell Sharma MD    Session #: 11    Oxygen Assessment    SP02 rest: 98 %  SP02 exertion: 94 %    Oxygen Use  Requires supplemental O2:    Liters at Rest: ra   Liters with Exertion: ra  Using O2 as prescribed: na      Weight Management    Height: 5'4  Weight: 203lbs  BMI:        Daily Activity Log   Modality METS Load Duration   1 Pre-Exercise6/0      2 Treadmill       3 Nustep 4000   11/2 2.3-2.8 6.0 17:00   4 Recumbent Cycle scifit 11/2 W9-10 4.5 15:00   5 Weights       6 UBE      7 Rower  11/2 w32 Mph2.2 10:00   8 Aero Bike      9 Cardio-fit      10 Post-Exercise6/0            Other Core Components/Risk Factor Assessment:    Blood Pressure Management:  Hx of Hypertension: no  Resting BP: 127/79  POST BP: 124/81      General Comments:  Patient is working this weekend 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

## 2025-03-17 ENCOUNTER — CLINICAL SUPPORT (OUTPATIENT)
Dept: CARDIAC REHAB | Facility: HOSPITAL | Age: 75
End: 2025-03-17
Payer: MEDICARE

## 2025-03-17 DIAGNOSIS — J30.9 ALLERGIC RHINITIS, UNSPECIFIED SEASONALITY, UNSPECIFIED TRIGGER: ICD-10-CM

## 2025-03-17 DIAGNOSIS — J84.9 ILD (INTERSTITIAL LUNG DISEASE) (MULTI): Primary | ICD-10-CM

## 2025-03-17 PROCEDURE — 94626 PHY/QHP OP PULM RHB W/MNTR: CPT | Performed by: PEDIATRICS

## 2025-03-17 RX ORDER — CETIRIZINE HYDROCHLORIDE 10 MG/1
10 TABLET ORAL DAILY
Qty: 90 TABLET | Refills: 0 | Status: SHIPPED | OUTPATIENT
Start: 2025-03-17

## 2025-03-17 NOTE — PROGRESS NOTES
Pulmonary Daily Rehabilitation Assessment    Name: Justine Mesa  Medical Record Number: [id number]  YOB: 1950    Today’s Date: 3/17/2025  Primary Care Physician: Odell Sharma MD    Session #: 12    Oxygen Assessment    SP02 rest: 99 %  SP02 exertion: 95 %    Oxygen Use  Requires supplemental O2:    Liters at Rest: ra   Liters with Exertion: ra  Using O2 as prescribed: na      Weight Management    Height: 5'4  Weight: 200.5lbs  BMI:        Daily Activity Log   Modality METS Load Duration   1 Pre-Exercise6/0      2 Treadmill 11/2 1.9 1.3 14:00   3 Nustep 4000       4 Recumbent Cycle       5 Weights       6 UBE 11/2 W5-10 Rpm 50- 63 13:35   7 Rower      8 Aero Bike  11/2 W33-58 Rpm 25-32 14:00           9 Cardio-fit      10 Post-Exercise6/0            Other Core Components/Risk Factor Assessment:    Blood Pressure Management:  Hx of Hypertension: no  Resting BP: 113/75  POST BP: 129/73      General Comments:  Patient refused education is comfortable with topic  Discussed her home workouts and purse lip breathing today    Rehab Staff Signature: Juhi Andres, RRT

## 2025-03-19 ENCOUNTER — CLINICAL SUPPORT (OUTPATIENT)
Dept: CARDIAC REHAB | Facility: HOSPITAL | Age: 75
End: 2025-03-19
Payer: MEDICARE

## 2025-03-19 DIAGNOSIS — J84.9 ILD (INTERSTITIAL LUNG DISEASE) (MULTI): Primary | ICD-10-CM

## 2025-03-19 PROCEDURE — 94626 PHY/QHP OP PULM RHB W/MNTR: CPT | Performed by: PEDIATRICS

## 2025-03-19 NOTE — PROGRESS NOTES
Pulmonary Daily Rehabilitation Assessment    Name: Justine Mesa  Medical Record Number: [id number]  YOB: 1950    Today’s Date: 3/19/2025  Primary Care Physician: Odell Sharma MD    Session #: 13    Oxygen Assessment    SP02 rest: 98 %  SP02 exertion: 97 %    Oxygen Use  Requires supplemental O2:    Liters at Rest: ra   Liters with Exertion: ra  Using O2 as prescribed: na      Weight Management    Height: 5'4  Weight: 202lbs  BMI:        Daily Activity Log   Modality METS Load Duration   1 Pre-Exercise6/0      2 Treadmill 11/1 1.9 1.3 15:00   3 Nustep 4000   12/2 2.9-3.5 7.0 15:00   4 Recumbent Cycle       5 Weights       6 UBE      7 Rower  12/2 w26 Mph1.9 10:00   8 Aero Bike      9 Cardio-fit      10 Post-Exercise6/0            Other Core Components/Risk Factor Assessment:    Blood Pressure Management:  Hx of Hypertension: no  Resting BP: 120/72  POST BP: 126/64      General Comments:  Patient saw cardiologist yesterday and everything looks good-patient worked out at the [facility] yesterday and feels good today      Rehab Staff Signature: Juhi Andres, RRT

## 2025-03-24 ENCOUNTER — APPOINTMENT (OUTPATIENT)
Dept: CARDIAC REHAB | Facility: HOSPITAL | Age: 75
End: 2025-03-24
Payer: MEDICARE

## 2025-03-26 ENCOUNTER — CLINICAL SUPPORT (OUTPATIENT)
Dept: CARDIAC REHAB | Facility: HOSPITAL | Age: 75
End: 2025-03-26
Payer: MEDICARE

## 2025-03-26 DIAGNOSIS — J84.9 ILD (INTERSTITIAL LUNG DISEASE) (MULTI): Primary | ICD-10-CM

## 2025-03-26 PROCEDURE — 94626 PHY/QHP OP PULM RHB W/MNTR: CPT | Performed by: PEDIATRICS

## 2025-03-26 NOTE — PROGRESS NOTES
Pulmonary Daily Rehabilitation Assessment    Name: Justine Mesa  Medical Record Number: [id number]  YOB: 1950    Today’s Date: 3/26/2025  Primary Care Physician: Odell Sharma MD    Session #: 14    Oxygen Assessment    SP02 rest: 98 %  SP02 exertion: 96 %    Oxygen Use  Requires supplemental O2:    Liters at Rest: ra   Liters with Exertion: ra  Using O2 as prescribed: na      Weight Management    Height: 5'4  Weight: 198lbs  BMI:        Daily Activity Log   Modality METS Load Duration   1 Pre-Exercise6/0      2 Treadmill       3 Nustep 4000   11/2 3.3-3.4 7.0 16:00   4 Recumbent Cycle scifit 11/2 W16-19 5.0 15:00   5 Weights       6 UBE  11/2 W5-10 Rpm 57-62 14:00   7 Rower      8 Aero Bike      9 Cardio-fit      10 Post-Exercise 6/0            Other Core Components/Risk Factor Assessment:    Blood Pressure Management:  Hx of Hypertension: no  Resting BP: 121/74  POST BP: 123/76      General Comments:  Patient will be here next week but will have retina surgery on 5/3/25 so she will be out for a couple of weeks - We discussed working out more at home -she also has been working alot      Rehab Staff Signature: Juhi Andres, RRT

## 2025-03-31 ENCOUNTER — CLINICAL SUPPORT (OUTPATIENT)
Dept: CARDIAC REHAB | Facility: HOSPITAL | Age: 75
End: 2025-03-31
Payer: MEDICARE

## 2025-03-31 DIAGNOSIS — J84.9 ILD (INTERSTITIAL LUNG DISEASE) (MULTI): Primary | ICD-10-CM

## 2025-03-31 PROCEDURE — 94626 PHY/QHP OP PULM RHB W/MNTR: CPT | Performed by: PEDIATRICS

## 2025-03-31 NOTE — PROGRESS NOTES
Pulmonary Daily Rehabilitation Assessment    Name: Justine Mesa  Medical Record Number: [id number]  YOB: 1950    Today’s Date: 3/31/2025  Primary Care Physician: Odell Sharma MD    Session #: 15    Oxygen Assessment    SP02 rest: 98 %  SP02 exertion: 95 %    Oxygen Use  Requires supplemental O2:    Liters at Rest: ra   Liters with Exertion: ra  Using O2 as prescribed: na      Weight Management    Height: 5'4  Weight: 199lbs  BMI:        Daily Activity Log   Modality METS Load Duration   1 Pre-Exercise6/0      2 Treadmill 13/2 1.9 1.3 16:00   3 Nustep 4000       4 Recumbent Cycle       5 Weights       6 UBE11/2 W5-10 Rpm61- 71 15:00   7 Rower  11/2 w29 Mph1.9 11:00   8 Aero Bike      9 Cardio-fit      10 Post-Exercise6/0            Other Core Components/Risk Factor Assessment:    Blood Pressure Management:  Hx of Hypertension: none  Resting BP: 120/76  POST BP: 124/77      General Comments:  Patient declined education on Oxygen today - 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

## 2025-04-02 ENCOUNTER — APPOINTMENT (OUTPATIENT)
Dept: CARDIOLOGY | Facility: HOSPITAL | Age: 75
End: 2025-04-02
Payer: MEDICARE

## 2025-04-02 ENCOUNTER — HOSPITAL ENCOUNTER (EMERGENCY)
Facility: HOSPITAL | Age: 75
Discharge: HOME | End: 2025-04-02
Attending: EMERGENCY MEDICINE
Payer: MEDICARE

## 2025-04-02 ENCOUNTER — APPOINTMENT (OUTPATIENT)
Dept: RADIOLOGY | Facility: HOSPITAL | Age: 75
End: 2025-04-02
Payer: MEDICARE

## 2025-04-02 ENCOUNTER — CLINICAL SUPPORT (OUTPATIENT)
Dept: CARDIAC REHAB | Facility: HOSPITAL | Age: 75
End: 2025-04-02
Payer: MEDICARE

## 2025-04-02 VITALS
TEMPERATURE: 97.5 F | OXYGEN SATURATION: 93 % | SYSTOLIC BLOOD PRESSURE: 124 MMHG | HEART RATE: 64 BPM | RESPIRATION RATE: 17 BRPM | HEIGHT: 64 IN | DIASTOLIC BLOOD PRESSURE: 68 MMHG | WEIGHT: 191 LBS | BODY MASS INDEX: 32.61 KG/M2

## 2025-04-02 DIAGNOSIS — I48.91 ATRIAL FIBRILLATION WITH RVR (MULTI): Primary | ICD-10-CM

## 2025-04-02 DIAGNOSIS — J84.9 ILD (INTERSTITIAL LUNG DISEASE) (MULTI): Primary | ICD-10-CM

## 2025-04-02 LAB
ALBUMIN SERPL BCP-MCNC: 4.3 G/DL (ref 3.4–5)
ALP SERPL-CCNC: 67 U/L (ref 33–136)
ALT SERPL W P-5'-P-CCNC: 10 U/L (ref 7–45)
ANION GAP SERPL CALC-SCNC: 12 MMOL/L (ref 10–20)
APTT PPP: 37 SECONDS (ref 26–36)
AST SERPL W P-5'-P-CCNC: 17 U/L (ref 9–39)
BASOPHILS # BLD MANUAL: 0.96 X10*3/UL (ref 0–0.1)
BASOPHILS NFR BLD MANUAL: 9 %
BILIRUB SERPL-MCNC: 0.6 MG/DL (ref 0–1.2)
BUN SERPL-MCNC: 13 MG/DL (ref 6–23)
CALCIUM SERPL-MCNC: 10.3 MG/DL (ref 8.6–10.3)
CARDIAC TROPONIN I PNL SERPL HS: 4 NG/L (ref 0–13)
CARDIAC TROPONIN I PNL SERPL HS: 5 NG/L (ref 0–13)
CHLORIDE SERPL-SCNC: 100 MMOL/L (ref 98–107)
CO2 SERPL-SCNC: 35 MMOL/L (ref 21–32)
CREAT SERPL-MCNC: 0.87 MG/DL (ref 0.5–1.05)
D DIMER PPP FEU-MCNC: 1014 NG/ML FEU
EGFRCR SERPLBLD CKD-EPI 2021: 70 ML/MIN/1.73M*2
EOSINOPHIL # BLD MANUAL: 0 X10*3/UL (ref 0–0.4)
EOSINOPHIL NFR BLD MANUAL: 0 %
ERYTHROCYTE [DISTWIDTH] IN BLOOD BY AUTOMATED COUNT: 15.1 % (ref 11.5–14.5)
GIANT PLATELETS BLD QL SMEAR: ABNORMAL
GLUCOSE SERPL-MCNC: 83 MG/DL (ref 74–99)
HCT VFR BLD AUTO: 51.5 % (ref 36–46)
HGB BLD-MCNC: 16.4 G/DL (ref 12–16)
IMM GRANULOCYTES # BLD AUTO: 1.06 X10*3/UL (ref 0–0.5)
IMM GRANULOCYTES NFR BLD AUTO: 9.9 % (ref 0–0.9)
LYMPHOCYTES # BLD MANUAL: 2.78 X10*3/UL (ref 0.8–3)
LYMPHOCYTES NFR BLD MANUAL: 26 %
MCH RBC QN AUTO: 29.9 PG (ref 26–34)
MCHC RBC AUTO-ENTMCNC: 31.8 G/DL (ref 32–36)
MCV RBC AUTO: 94 FL (ref 80–100)
MONOCYTES # BLD MANUAL: 1.07 X10*3/UL (ref 0.05–0.8)
MONOCYTES NFR BLD MANUAL: 10 %
NEUTS SEG # BLD MANUAL: 5.67 X10*3/UL (ref 1.6–5)
NEUTS SEG NFR BLD MANUAL: 53 %
NRBC BLD-RTO: 0 /100 WBCS (ref 0–0)
PLATELET # BLD AUTO: 220 X10*3/UL (ref 150–450)
POTASSIUM SERPL-SCNC: 3.5 MMOL/L (ref 3.5–5.3)
PROT SERPL-MCNC: 7.7 G/DL (ref 6.4–8.2)
RBC # BLD AUTO: 5.49 X10*6/UL (ref 4–5.2)
RBC MORPH BLD: ABNORMAL
SODIUM SERPL-SCNC: 143 MMOL/L (ref 136–145)
TOTAL CELLS COUNTED BLD: 100
VARIANT LYMPHS # BLD MANUAL: 0.21 X10*3/UL (ref 0–0.3)
VARIANT LYMPHS NFR BLD: 2 %
WBC # BLD AUTO: 10.7 X10*3/UL (ref 4.4–11.3)

## 2025-04-02 PROCEDURE — 93005 ELECTROCARDIOGRAM TRACING: CPT

## 2025-04-02 PROCEDURE — 36415 COLL VENOUS BLD VENIPUNCTURE: CPT | Performed by: EMERGENCY MEDICINE

## 2025-04-02 PROCEDURE — 84484 ASSAY OF TROPONIN QUANT: CPT | Performed by: EMERGENCY MEDICINE

## 2025-04-02 PROCEDURE — 71275 CT ANGIOGRAPHY CHEST: CPT

## 2025-04-02 PROCEDURE — 94626 PHY/QHP OP PULM RHB W/MNTR: CPT | Performed by: PEDIATRICS

## 2025-04-02 PROCEDURE — 85730 THROMBOPLASTIN TIME PARTIAL: CPT | Performed by: EMERGENCY MEDICINE

## 2025-04-02 PROCEDURE — 85007 BL SMEAR W/DIFF WBC COUNT: CPT | Performed by: EMERGENCY MEDICINE

## 2025-04-02 PROCEDURE — 85379 FIBRIN DEGRADATION QUANT: CPT | Performed by: EMERGENCY MEDICINE

## 2025-04-02 PROCEDURE — 96366 THER/PROPH/DIAG IV INF ADDON: CPT

## 2025-04-02 PROCEDURE — 96365 THER/PROPH/DIAG IV INF INIT: CPT | Mod: 59

## 2025-04-02 PROCEDURE — 71275 CT ANGIOGRAPHY CHEST: CPT | Performed by: RADIOLOGY

## 2025-04-02 PROCEDURE — 85027 COMPLETE CBC AUTOMATED: CPT | Performed by: EMERGENCY MEDICINE

## 2025-04-02 PROCEDURE — 2550000001 HC RX 255 CONTRASTS: Performed by: EMERGENCY MEDICINE

## 2025-04-02 PROCEDURE — 2500000004 HC RX 250 GENERAL PHARMACY W/ HCPCS (ALT 636 FOR OP/ED): Performed by: EMERGENCY MEDICINE

## 2025-04-02 PROCEDURE — 2500000005 HC RX 250 GENERAL PHARMACY W/O HCPCS: Performed by: EMERGENCY MEDICINE

## 2025-04-02 PROCEDURE — 80053 COMPREHEN METABOLIC PANEL: CPT | Performed by: EMERGENCY MEDICINE

## 2025-04-02 PROCEDURE — 99285 EMERGENCY DEPT VISIT HI MDM: CPT | Mod: 25 | Performed by: EMERGENCY MEDICINE

## 2025-04-02 PROCEDURE — 99285 EMERGENCY DEPT VISIT HI MDM: CPT | Mod: 25

## 2025-04-02 PROCEDURE — 99291 CRITICAL CARE FIRST HOUR: CPT | Mod: 25

## 2025-04-02 PROCEDURE — 71045 X-RAY EXAM CHEST 1 VIEW: CPT

## 2025-04-02 RX ORDER — METOPROLOL SUCCINATE 25 MG/1
25 TABLET, EXTENDED RELEASE ORAL DAILY
Qty: 20 TABLET | Refills: 0 | Status: SHIPPED | OUTPATIENT
Start: 2025-04-02 | End: 2025-04-22

## 2025-04-02 RX ORDER — HEPARIN SODIUM 10000 [USP'U]/100ML
0-4500 INJECTION, SOLUTION INTRAVENOUS CONTINUOUS
Status: DISCONTINUED | OUTPATIENT
Start: 2025-04-02 | End: 2025-04-02

## 2025-04-02 RX ORDER — FLECAINIDE ACETATE 50 MG/1
50 TABLET ORAL 2 TIMES DAILY
Qty: 60 TABLET | Refills: 0 | Status: SHIPPED | OUTPATIENT
Start: 2025-04-02 | End: 2026-04-02

## 2025-04-02 RX ADMIN — DILTIAZEM HYDROCHLORIDE 5 MG/HR: 5 INJECTION INTRAVENOUS at 12:10

## 2025-04-02 RX ADMIN — IOHEXOL 75 ML: 350 INJECTION, SOLUTION INTRAVENOUS at 13:29

## 2025-04-02 RX ADMIN — HEPARIN SODIUM 1600 UNITS/HR: 10000 INJECTION, SOLUTION INTRAVENOUS at 12:20

## 2025-04-02 ASSESSMENT — PAIN - FUNCTIONAL ASSESSMENT: PAIN_FUNCTIONAL_ASSESSMENT: 0-10

## 2025-04-02 ASSESSMENT — PAIN SCALES - GENERAL: PAINLEVEL_OUTOF10: 0 - NO PAIN

## 2025-04-02 NOTE — DISCHARGE INSTRUCTIONS
Eliquis flecainide and metoprolol as prescribed.    Call Dr. Roach's office today for outpatient follow-up later this week.    Return for worsening symptoms or concerns.

## 2025-04-02 NOTE — ED PROVIDER NOTES
[facility]  ED  Provider Note  4/2/2025  9:54 AM  AC10/AC10              Chief Complaint   Patient presents with    Rapid Heart Rate     Was at pulmonary rehab, they sent her here for tachycardia and hypertension.         History of Present Illness:   Justine Mesa is a 74 y.o. female presenting to the ED for atrial fibrillation with rapid ventricular response, beginning day.  The complaint has been persistent, moderate in severity, and worsened by nothing patient is here doing some preop checking for possible eye surgery for Fuchs retinal dystrophy..  She has found atrial fibrillation with rapid ventricle response in the 140s.  She denies chest pain.  She does recommend his heart beating irregularly.  She is not short of breath.  She denies any recent change in medication or diet.  She has no history of hypothyroidism.  She denies recent weight loss.  She denies recent over-the-counter drug use with caffeine or Sudafed.      Review of Systems:   Pertinent positives and review of systems as noted above.  Remaining 10 review of systems is negative or noncontributory to today's episode of care.  Review of Systems       --------------------------------------------- PAST HISTORY ---------------------------------------------  Past Medical History:   Diagnosis Date    Accidental fall 05/14/2024    Acute bronchitis, unspecified     Acute bronchitis, unspecified organism    Acute frontal sinusitis, unspecified 01/06/2020    Acute frontal sinusitis    Acute frontal sinusitis, unspecified 03/07/2018    Acute frontal sinusitis    Acute gastroenteritis 05/14/2024    Acute maxillary sinusitis, unspecified 01/06/2020    Acute maxillary sinusitis    Acute maxillary sinusitis, unspecified 11/09/2017    Acute maxillary antritis    Acute tonsillitis due to other specified organisms     Acute tonsillitis due to other specified organisms    Acute tonsillitis due to other specified organisms 01/31/2017    Acute tonsillitis  due to other specified organisms    Acute upper respiratory infection, unspecified 04/18/2017    Viral URI with cough    Acute upper respiratory infection, unspecified 06/07/2017    Acute URI    Chronic frontal sinusitis 03/07/2018    Frontal sinusitis    Contact with and (suspected) exposure to covid-19 01/24/2023    Diaphragmatic hernia without obstruction or gangrene 06/13/2022    Paraesophageal hiatal hernia    Hypothyroidism, unspecified 01/04/2022    Hypothyroidism    Left sided abdominal pain 05/14/2024    Pain in both feet 05/14/2024    Pain in unspecified lower leg 07/16/2019    Calf pain    Personal history of other diseases of the digestive system 07/11/2022    History of gastroesophageal reflux (GERD)    Personal history of other diseases of the digestive system     History of gastroesophageal reflux (GERD)    Personal history of other diseases of the digestive system     History of hemorrhoids    Personal history of other diseases of the respiratory system 03/07/2018    History of sore throat    Personal history of other diseases of the respiratory system 11/09/2017    History of acute pharyngitis    Personal history of other diseases of the respiratory system 11/21/2017    History of acute bronchitis    Personal history of other diseases of the respiratory system 03/07/2018    History of acute pharyngitis    Personal history of other specified conditions     History of dizziness    Personal history of urinary calculi     Personal history of renal calculi    Pneumonia 01/24/2023         has a past surgical history that includes Other surgical history (05/04/2021); Other surgical history (10/11/2020); Hysterectomy (1986); and Gallbladder surgery (10/11/2020).     reports that she has never smoked. She has never been exposed to tobacco smoke. She has never used smokeless tobacco. She reports current alcohol use. She reports that she does not use drugs.    family history includes Hypertension in her father  and mother; diabetes mellitus in her mother. Unless otherwise noted, family history is non contributory    Patient's Medications   New Prescriptions    No medications on file   Previous Medications    ALBUTEROL 90 MCG/ACTUATION INHALER    Inhale. INHALE 1 TO 2 PUFFS EVERY 4 TO 6 HOURS AS NEEDED.    ASCORBIC ACID (VITAMIN C) 1,000 MG TABLET    Take 1 tablet (1,000 mg) by mouth once daily.    BREO ELLIPTA 200-25 MCG/DOSE INHALER    Inhale 1 puff once daily.    CALCIUM CITRATE-VITAMIN D3 (CITRACAL+D) 315 MG-5 MCG (200 UNIT) TABLET    Take 1 tablet by mouth once daily.    CETIRIZINE (ZYRTEC) 10 MG TABLET    TAKE ONE TABLET BY MOUTH once DAILY    CRANBERRY 400 MG CAPSULE    Take 500 mg by mouth.    DICLOFENAC SODIUM (ASPERCREME ARTHRITIS PAIN) 1 % GEL    APPLY TOPICALLY TO AFFECTED AREA 4 TIMES DAILY AS NEEDED FOR KNEE PAIN    FLUTICASONE (FLONASE) 50 MCG/ACTUATION NASAL SPRAY    Administer 2 sprays into each nostril once daily.    FUROSEMIDE (LASIX) 40 MG TABLET    Take by mouth.    GUAIFENESIN (MUCINEX) 600 MG 12 HR TABLET    Take 2 tablets (1,200 mg) by mouth once daily.    LEVOTHYROXINE (SYNTHROID, LEVOXYL) 50 MCG TABLET    TAKE ONE TABLET BY MOUTH EVERY DAY    OMEPRAZOLE (PRILOSEC) 40 MG DR CAPSULE    Take 1 capsule (40 mg) by mouth once daily in the morning. Take before meals. Do not crush or chew.    OXYBUTYNIN XL (DITROPAN-XL) 10 MG 24 HR TABLET    Take 1 tablet (10 mg) by mouth once daily.   Modified Medications    No medications on file   Discontinued Medications    No medications on file      The patient’s home medications have been reviewed.    Allergies: Sulfa (sulfonamide antibiotics)    -------------------------------------------------- RESULTS -------------------------------------------------  All laboratory and radiology results have been personally reviewed by myself   LABS:  Labs Reviewed   D-DIMER, VTE EXCLUSION - Abnormal       Result Value    D-Dimer, Quantitative VTE Exclusion 1,014 (*)      Narrative:     The VTE Exclusion D-Dimer assay is reported in ng/mL Fibrinogen Equivalent Units (FEU).    Per 's instructions for use, a value of less than 500 ng/mL (FEU) may help to exclude DVT or PE in outpatients when the assay is used with a clinical pretest probability assessment.([facility] must utilize and document eCalc 'Wells Score Deep Vein Thrombosis Risk' for DVT exclusion only. Emergency Department should utilize  Guidelines for Emergency Department Use of the VTE Exclusion D-Dimer and Clinical Pretest probability assessment model for DVT or PE exclusion.)   COMPREHENSIVE METABOLIC PANEL - Abnormal    Glucose 83      Sodium 143      Potassium 3.5      Chloride 100      Bicarbonate 35 (*)     Anion Gap 12      Urea Nitrogen 13      Creatinine 0.87      eGFR 70      Calcium 10.3      Albumin 4.3      Alkaline Phosphatase 67      Total Protein 7.7      AST 17      Bilirubin, Total 0.6      ALT 10     CBC WITH AUTO DIFFERENTIAL - Abnormal    WBC 10.7      nRBC 0.0      RBC 5.49 (*)     Hemoglobin 16.4 (*)     Hematocrit 51.5 (*)     MCV 94      MCH 29.9      MCHC 31.8 (*)     RDW 15.1 (*)     Platelets 220      Immature Granulocytes %, Automated 9.9 (*)     Immature Granulocytes Absolute, Automated 1.06 (*)     Narrative:     The previously reported component Neutrophils % is no longer being reported.  The previously reported component Lymphocytes % is no longer being reported.  The previously reported component Monocytes % is no longer being reported.  The previously   reported component Eosinophils % is no longer being reported.  The previously reported component Basophils % is no longer being reported.  The previously reported component Absolute Neutrophils is no longer being reported.  The previously reported   component Absolute Lymphocytes is no longer being reported.  The previously reported component Absolute Monocytes is no longer being reported.  The previously reported component Absolute  Eosinophils is no longer being reported.  The previously reported   component Absolute Basophils is no longer being reported.   APTT - Abnormal    aPTT 37 (*)     Narrative:     The APTT is no longer used for monitoring Unfractionated Heparin Therapy. For monitoring Heparin Therapy, use the Heparin Assay.   MANUAL DIFFERENTIAL - Abnormal    Neutrophils %, Manual 53.0      Lymphocytes %, Manual 26.0      Monocytes %, Manual 10.0      Eosinophils %, Manual 0.0      Basophils %, Manual 9.0      Atypical Lymphocytes %, Manual 2.0      Seg Neutrophils Absolute, Manual 5.67 (*)     Lymphocytes Absolute, Manual 2.78      Monocytes Absolute, Manual 1.07 (*)     Eosinophils Absolute, Manual 0.00      Basophils Absolute, Manual 0.96 (*)     Atypical Lymphs Absolute, Manual 0.21      Total Cells Counted 100      RBC Morphology See Below      Giant Platelets Few     SERIAL TROPONIN-INITIAL - Normal    Troponin I, High Sensitivity 4      Narrative:     Less than 99th percentile of normal range cutoff-  Female and children under 18 years old <14 ng/L; Male <21 ng/L: Negative  Repeat testing should be performed if clinically indicated.     Female and children under 18 years old 14-50 ng/L; Male 21-50 ng/L:  Consistent with possible cardiac damage and possible increased clinical   risk. Serial measurements may help to assess extent of myocardial damage.     >50 ng/L: Consistent with cardiac damage, increased clinical risk and  myocardial infarction. Serial measurements may help assess extent of   myocardial damage.      NOTE: Children less than 1 year old may have higher baseline troponin   levels and results should be interpreted in conjunction with the overall   clinical context.     NOTE: Troponin I testing is performed using a different   testing methodology at [facility] than at other   [facility]. Direct result comparisons should only   be made within the same method.   SERIAL TROPONIN, 1 HOUR - Normal     Troponin I, High Sensitivity 5      Narrative:     Less than 99th percentile of normal range cutoff-  Female and children under 18 years old <14 ng/L; Male <21 ng/L: Negative  Repeat testing should be performed if clinically indicated.     Female and children under 18 years old 14-50 ng/L; Male 21-50 ng/L:  Consistent with possible cardiac damage and possible increased clinical   risk. Serial measurements may help to assess extent of myocardial damage.     >50 ng/L: Consistent with cardiac damage, increased clinical risk and  myocardial infarction. Serial measurements may help assess extent of   myocardial damage.      NOTE: Children less than 1 year old may have higher baseline troponin   levels and results should be interpreted in conjunction with the overall   clinical context.     NOTE: Troponin I testing is performed using a different   testing methodology at [facility] than at other   [facility]. Direct result comparisons should only   be made within the same method.   TROPONIN SERIES- (INITIAL, 1 HR)    Narrative:     The following orders were created for panel order Troponin I Series, High Sensitivity (0, 1 HR).  Procedure                               Abnormality         Status                     ---------                               -----------         ------                     Troponin I, High Sensiti...[[id number]]  Normal              Final result               Troponin, High Sensitivi...[[id number]]  Normal              Final result                 Please view results for these tests on the individual orders.       EKG: atrial fibrillation with RVR to 144 bpm, normal axis, nonspecific ST flattening, no acute ST elevations.  Interpreted by SHELLY Burgos MD    EKG #2, sinus bradycardia 57 bpm, normal axis, right atrial enlargement, LVH by voltage, no acute ST elevations.  Interpreted by SHELLY Burgos MD    RADIOLOGY:  Interpreted by Radiologist.  CT angio chest for pulmonary embolism  "  Final Result   1. No evidence for pulmonary embolus        2. No focal infiltrate in the chest        3. Scattered pulmonary nodules as seen on remote imaging including   01/24/2023 largest measuring 7 mm in the superior segment of the   right lower lobe unchanged.             MACRO:   None        Signed by: Lukas Sarabia 4/2/2025 1:45 PM   Dictation workstation:   [id number]      XR chest 1 view   Final Result   Mild left basilar atelectasis. Otherwise, no evidence of acute   cardiopulmonary process.        Signed by: Luly Paz 4/2/2025 11:14 AM   Dictation workstation:   [id number]          ------------------------- NURSING NOTES AND VITALS REVIEWED ---------------------------   The nursing notes within the ED encounter and vital signs as below have been reviewed.   /61 (BP Location: Right arm, Patient Position: Lying)   Pulse 75   Temp 36.4 °C (97.6 °F) (Temporal)   Resp 16   Ht 1.626 m (5' 4\")   Wt 86.6 kg (191 lb)   SpO2 100%   BMI 32.79 kg/m²   Oxygen Saturation Interpretation: Normal      ---------------------------------------------------PHYSICAL EXAM--------------------------------------  Physical Exam   Constitutional/General: Alert and oriented x3, well appearing, non toxic in NAD  Head: Normocephalic and atraumatic  Eyes: PERRL, EOMI, conjunctiva normal, sclera non icteric  Mouth: Oropharynx clear, handling secretions, no trismus, no asymmetry of the posterior oropharynx or uvular edema  Neck: Supple, full ROM, non tender to palpation in the midline, no stridor, no crepitus, no meningeal signs  Respiratory: Lungs clear to auscultation bilaterally, no wheezes, rales, or rhonchi  Cardiovascular:  Regular rate. Regular rhythm. No murmurs, gallops, or rubs. 2+ distal pulses  Chest: No chest wall tenderness  GI:  Abdomen Soft, Non tender, Non distended.  +BS. No organomegaly, no palpable masses,  No rebound, guarding, or rigidity. No CVAT   Musculoskeletal: Moves all extremities x 4. " Warm and well perfused, no clubbing, cyanosis, or edema. Capillary refill <3 seconds  Integument: skin warm and dry. No rashes.   Lymphatic: no lymphadenopathy noted  Neurologic:  No focal deficits, symmetric strength 5/5 in the upper and lower extremities bilaterally  Psychiatric: Normal Affect    Procedures    Diagnoses as of 04/02/25 1438   Atrial fibrillation with RVR (Multi)          Medical Decision Making:   Patient paroxysmal atrial flutter during her pulmonary evaluation for her eye surgery.  She was placed on Cardizem for a brief period of time returned back to normal sinus rhythm has remained there since.  I have consulted Dr. Roach on her primary care doctor suggest the patient be sent home on Eliquis flecainide and metoprolol.  I will do this and have her call the office later today for an appointment and follow-up care      Counseling:   The emergency provider has spoken with the patient and discussed today’s results, in addition to providing specific details for the plan of care and counseling regarding the diagnosis and prognosis.  Questions are answered at this time and they are agreeable with the plan.      --------------------------------- IMPRESSION AND DISPOSITION ---------------------------------        IMPRESSION  1. Atrial fibrillation with RVR (Multi)        DISPOSITION  Disposition: Discharge to home  Patient condition is fair      CRITICAL CARE TIME     CRITICAL CARE :  The high probability of clinically significant deterioration in the patient’s condition required my highest level of medical decision making and my highest level of preparedness to intervene emergently.   I provided minutes 35 of critical care, not including other billable services/procedures.    The patient was reevaluated/re-examined multiple times during the visit. Critical care time includes management at bedside, discussion with other providers and consultants, family counseling and answering questions, and  documentation. Care involves decision making of high complexity to assess, manipulate, and support vital organ system failure and/or to prevent further life threatening deterioration of the patient's condition. Failure to initiate these interventions on an urgent basis would likely result in sudden, clinically significant or life threatening deterioration in the patient's condition of atrial fibrillation with RVR     Mike Burgos MD  04/02/25 1269

## 2025-04-02 NOTE — PROGRESS NOTES
Pulmonary Daily Rehabilitation Assessment    Name: Justine Mesa  Medical Record Number: [id number]  YOB: 1950    Today’s Date: 4/2/2025  Primary Care Physician: Odell Sharma MD    Session #: 16    Oxygen Assessment    SP02 rest: 98 %  SP02 exertion: 95 %    Oxygen Use  Requires supplemental O2:    Liters at Rest: ra   Liters with Exertion: ra  Using O2 as prescribed: na      Weight Management    Height: 5'4  Weight: 198lbs  BMI:        Daily Activity Log   Modality METS Load Duration   1 Pre-Exercise6/0      2 Treadmill       3 Nustep 4000   7.0 2:00   4 Recumbent Cycle       5 Weights       6 UBE      7 Rower      8 Aero Bike      9 Cardio-fit      10 Post-Exercise            Other Core Components/Risk Factor Assessment:    Blood Pressure Management:  Hx of Hypertension: none  Resting BP: 124/83  POST BP: 146/93      General Comments:  Patient felt good -rushing to get here-did not sleep well last night-Patients BP higher than normal to start - Patients  pulse increased to 143 quickly on the Nustep with SpO2s between 95-99% on RA - patient stopped while on the monitor pulse rate kept switching between  triple checked & manually verified since patient has had no prior cardiac issues. Patient with no symptoms and said she did not feel her heart racing at this time- BP's at this time 146/93 & after sitting still for a couple of minutes 144/80 - Patient agreed to go to the ER to get checked out -transported to ER via wheelchair & her  was with her today      Rehab Staff Signature: Juhi Andres, RRT

## 2025-04-03 LAB
ATRIAL RATE: 138 BPM
Q ONSET: 220 MS
QRS COUNT: 23 BEATS
QRS DURATION: 92 MS
QT INTERVAL: 282 MS
QTC CALCULATION(BAZETT): 436 MS
QTC FREDERICIA: 377 MS
R AXIS: 7 DEGREES
T AXIS: 37 DEGREES
T OFFSET: 361 MS
VENTRICULAR RATE: 144 BPM

## 2025-04-07 ENCOUNTER — APPOINTMENT (OUTPATIENT)
Dept: CARDIAC REHAB | Facility: HOSPITAL | Age: 75
End: 2025-04-07
Payer: MEDICARE

## 2025-04-09 ENCOUNTER — APPOINTMENT (OUTPATIENT)
Dept: CARDIAC REHAB | Facility: HOSPITAL | Age: 75
End: 2025-04-09
Payer: MEDICARE

## 2025-04-10 ENCOUNTER — TELEPHONE (OUTPATIENT)
Dept: CARDIAC REHAB | Facility: HOSPITAL | Age: 75
End: 2025-04-10
Payer: MEDICARE

## 2025-04-11 DIAGNOSIS — J45.909 ASTHMATIC BRONCHITIS WITHOUT COMPLICATION, UNSPECIFIED ASTHMA SEVERITY, UNSPECIFIED WHETHER PERSISTENT (HHS-HCC): Primary | ICD-10-CM

## 2025-04-11 PROCEDURE — RXMED WILLOW AMBULATORY MEDICATION CHARGE

## 2025-04-14 PROCEDURE — RXMED WILLOW AMBULATORY MEDICATION CHARGE

## 2025-04-16 ENCOUNTER — APPOINTMENT (OUTPATIENT)
Dept: CARDIAC REHAB | Facility: HOSPITAL | Age: 75
End: 2025-04-16
Payer: MEDICARE

## 2025-04-16 ENCOUNTER — PHARMACY VISIT (OUTPATIENT)
Dept: PHARMACY | Facility: CLINIC | Age: 75
End: 2025-04-16
Payer: COMMERCIAL

## 2025-04-21 ENCOUNTER — CLINICAL SUPPORT (OUTPATIENT)
Dept: CARDIAC REHAB | Facility: HOSPITAL | Age: 75
End: 2025-04-21
Payer: MEDICARE

## 2025-04-21 DIAGNOSIS — J84.10 PULMONARY INTERSTITIAL FIBROSIS (MULTI): Primary | ICD-10-CM

## 2025-04-21 PROCEDURE — 94626 PHY/QHP OP PULM RHB W/MNTR: CPT | Performed by: PEDIATRICS

## 2025-04-21 NOTE — PROGRESS NOTES
Pulmonary Daily Rehabilitation Assessment    Name: Justine Mesa  Medical Record Number: [id number]  YOB: 1950    Today’s Date: 4/21/2025  Primary Care Physician: Odell Sharma MD    Session #: 19    Oxygen Assessment    SP02 rest: 99 %  SP02 exertion: 95 %    Oxygen Use  Requires supplemental O2:    Liters at Rest: ra   Liters with Exertion: ra  Using O2 as prescribed: na      Weight Management    Height: 5'4  Weight: 202lbs  BMI:        Daily Activity Log   Modality METS Load Duration   1 Pre-Exercise6/0      2 Treadmill 9/1 2.0 1.4 16:00   3 Nustep 4000       4 Recumbent Cycle scifit 9/1 W13-16 5.0 16:00   5 Weights       6 UBE      7 Rower  11/2 w39 Mph2,2 13:00   8 Aero Bike      9 Cardio-fit      10 Post-Exercise6/0            Other Core Components/Risk Factor Assessment:    Blood Pressure Management:  Hx of Hypertension: no  Resting BP: 121/75  POST BP: 136/77      General Comments:  Patient declined education of COPD & is knowledgable on topic - discussed healthy eating & her upcoming doctors appt.      Rehab Staff Signature: Juhi Andres, RRT

## 2025-04-23 ENCOUNTER — CLINICAL SUPPORT (OUTPATIENT)
Dept: CARDIAC REHAB | Facility: HOSPITAL | Age: 75
End: 2025-04-23
Payer: MEDICARE

## 2025-04-23 DIAGNOSIS — J84.9 ILD (INTERSTITIAL LUNG DISEASE) (MULTI): Primary | ICD-10-CM

## 2025-04-23 PROCEDURE — 94626 PHY/QHP OP PULM RHB W/MNTR: CPT | Performed by: PEDIATRICS

## 2025-04-23 NOTE — PROGRESS NOTES
Pulmonary Daily Rehabilitation Assessment    Name: Justine Mesa  Medical Record Number: [id number]  YOB: 1950    Today’s Date: 4/23/2025  Primary Care Physician: @PCP@    Session #: 20    Oxygen Assessment    SP02 rest: 97 %  SP02 [id number]: 96 %    Oxygen Use  Requires supplemental O2:    Liters at Rest: ra   Liters with Exertion: ra  Using O2 as prescribed: na      Weight Management    Height: 5'4  Weight: 200  BMI:        Daily Activity Log   Modality METS Load Duration   1 Pre-Exercise6/0      2 Treadmill 11/2 2.0 1,4 17:00   3 Nustep 4000       4 Recumbent Cycle       5 Weights       6 UBE  11/2 W5-10 Rpm 58-65 15:00   7 Rower      8 Aero Bike  11/2 W47-50 Rpm29- 38 11/2   9 Cardio-fit      10 Post-Exercise6/0            Other Core Components/Risk Factor Assessment:    Blood Pressure Management:  Hx of Hypertension: no  Resting BP: 118/72  POST BP: 116/73      General Comments:  Patient feels good today but she had a long walk in from the parking lot - she hasn't been going to the [facility] lately to exercise but has been stretyching and walking at home      Rehab Staff Signature: Juhi Andres, RRT

## 2025-04-28 ENCOUNTER — CLINICAL SUPPORT (OUTPATIENT)
Dept: CARDIAC REHAB | Facility: HOSPITAL | Age: 75
End: 2025-04-28
Payer: MEDICARE

## 2025-04-28 DIAGNOSIS — J84.9 ILD (INTERSTITIAL LUNG DISEASE) (MULTI): Primary | ICD-10-CM

## 2025-04-28 PROCEDURE — 94626 PHY/QHP OP PULM RHB W/MNTR: CPT | Performed by: PEDIATRICS

## 2025-04-28 NOTE — PROGRESS NOTES
Pulmonary Daily Rehabilitation Assessment    Name: Justine Mesa  Medical Record Number: [id number]  YOB: 1950    Today’s Date: 4/28/2025  Primary Care Physician: Odell Sharma MD    Session #: 21    Oxygen Assessment    SP02 rest: 99 %  SP02 exertion: 96 %    Oxygen Use  Requires supplemental O2:    Liters at Rest: ra   Liters with Exertion: ra  Using O2 as prescribed: na      Weight Management    Height: 5'4  Weight: 199.5lbs  BMI:        Daily Activity Log   Modality METS Load Duration   1 Pre-Exercise6/0      2 Treadmill  11/2 2.1 1.5 16:00   3 Nustep 4000 11/2 3.1-3.2 7.0 15:00   4 Recumbent Cycle       5 Weights       6 UBE      7 Rower  11/2 w30 Mph2.2 14:00   8 Aero Bike      9 Cardio-fit      10 Post-Exercise6/0            Other Core Components/Risk Factor Assessment:    Blood Pressure Management:  Hx of Hypertension: no  Resting BP: 137/85  POST BP: 119/70      General Comments:  Patient declined education - is knowledgeable on subject & had prior  Patient worked out over the weekend, walking through the woods & up & down hills -Patient feels good today -a little sore from camping      Rehab Staff Signature: Juhi Andres, RRT

## 2025-04-30 ENCOUNTER — APPOINTMENT (OUTPATIENT)
Dept: CARDIAC REHAB | Facility: HOSPITAL | Age: 75
End: 2025-04-30
Payer: MEDICARE

## 2025-05-05 ENCOUNTER — CLINICAL SUPPORT (OUTPATIENT)
Dept: CARDIAC REHAB | Facility: HOSPITAL | Age: 75
End: 2025-05-05
Payer: MEDICARE

## 2025-05-05 DIAGNOSIS — J84.9 ILD (INTERSTITIAL LUNG DISEASE) (MULTI): Primary | ICD-10-CM

## 2025-05-05 PROCEDURE — 94626 PHY/QHP OP PULM RHB W/MNTR: CPT | Performed by: PEDIATRICS

## 2025-05-06 NOTE — PROGRESS NOTES
Pulmonary Daily Rehabilitation Assessment    Name: Justine Mesa  Medical Record Number: [id number]  YOB: 1950    Today’s Date: 5/5/2025  Primary Care Physician: Odell Sharma MD    Session #: 22    Oxygen Assessment    SP02 rest: 99 %  SP02 exertion: 97 %    Oxygen Use  Requires supplemental O2:    Liters at Rest: ra   Liters with Exertion: ra  Using O2 as prescribed: na      Weight Management    Height: 5'4  Weight: 200lbs  BMI:        Daily Activity Log   Modality METS Load Duration   1 Pre-Exercise6/0      2 Treadmill 11/2 2.1 1.5 16:00   3 Nustep 4000       4 Recumbent Cycle       5 Weights       6 UBE  11/2 W5-10 Skf87-59 16:00   7 Rower      8 Aero Bike  11/2 W40-47 Rpm27- 29 16:00   9 Cardio-fit      10 Post-Exercise6/0            Other Core Components/Risk Factor Assessment:    Blood Pressure Management:  Hx of Hypertension: no  Resting BP: 127/83  POST BP: 117/73      General Comments:  Patient feels good today -she declined education & has had it in the past-she did work out over the weekend      Rehab Staff Signature: Juhi Andres, RRT

## 2025-05-07 ENCOUNTER — CLINICAL SUPPORT (OUTPATIENT)
Dept: CARDIAC REHAB | Facility: HOSPITAL | Age: 75
End: 2025-05-07
Payer: MEDICARE

## 2025-05-07 DIAGNOSIS — J84.9 ILD (INTERSTITIAL LUNG DISEASE) (MULTI): Primary | ICD-10-CM

## 2025-05-07 PROCEDURE — 94626 PHY/QHP OP PULM RHB W/MNTR: CPT | Performed by: PEDIATRICS

## 2025-05-07 NOTE — PROGRESS NOTES
Cardiac Rehabilitation {[facility] CR/OR/VR NOTE TYPE:13325}    Name: Justine Mesa  Medical Record Number: [id number]  YOB: 1950  Age: 74 y.o.    Today’s Date: 5/7/2025  Primary Care Physician: Odell Sharma MD  Referring Physician: Martir Molina, *  Program Location: [address]  Primary Diagnosis: No diagnosis found.   Onset/Date of Diagnosis: ***    {[facility] CR/OR/VR Session Number:43026}    AACVPR Risk Stratification:       Falls Risk: {[facility] CR/OR/VR FALLS RISK:30786}  Psychosocial Assessment     No data recorded    Sent PH-Q 9 to MD if score > 20: {[facility] CR/OR/VR PHQ-9 SENT TO MD:65939}    Pt reported/currently experiencing stress: {[facility] CR/OR/VR Reported Stress:46169}  Patient uses stress management skills: {YES/NO:200010}  History of: {Hx Anxiety/Depression:90058}  Currently seeing a mental health provider: {Yes/No:68012}  Social Support: {Yes/No:46187}  Quality of Life Survey: {[facility] CR/OR/VR QOLS:60305}  Learning Assessment:  Learning assessment/barriers: {Assessment/barriers:06748}  Preferred learning method: {Preferred learning method:27088}  Barriers: {Barriers to Education:31272}  Comments:    Stages of Change:{Stages of change:40269}    Psychosocial Plan    Goal Status: {[facility] CR/OR/VR Goal Status:17078}    Psychosocial Goals: {[facility] CR/OR/VR PSYCHOSOCIAL GOALS:37914}    Psychosocial Interventions/Education: {[facility] CR/OR/VR To be done in Cardiac Rehab:80763}    {UH AMB CR/OR/VR Psychosocial Initial Reassess Discharge:28986}    Nutrition Assessment:    Hyperlipidemia: {YES/NO:200010}    Lipids:   Lab Results   Component Value Date    CHOL 243 (H) 07/15/2024    HDL 48.8 07/15/2024    LDLF 145 (H) 05/04/2023    TRIG 193 (H) 07/15/2024       Current Dietary Guidelines: {Dietary Guidelines:15825:x}  Barriers to dietary change: {response; yes (wildcard)/no:889546}    Diet Habit Survey: Picture Your Plate  Pre: {[facility] CR/OR/VR PYP Initial:50064}  Post: { CR/OR/VR PYP Post:38342}    Diabetes  Assessment    Lab Results   Component Value Date    HGBA1C 5.4 09/24/2020       History of Diabetes: { CR/CA/VR DM ASSESSMENT:06708}    Weight Management       No data recorded    Nutrition Plan    Goal Status: { CR/CA/VR Goal Status:08617}    Nutrition Goals: { CR/CA/VR Nutrition Goals:21114}    Nutrition Interventions/Education:   { CR/CA/VR To be done in Cardiac Rehab:52663}    { AMB CR/CA/VR Nutrition Initial Reassess Discharge:74752}    Exercise Assessment    { CR/CA/VR Current Home Exercise:65508}    Exercise Prescription     Exercise Prescription based on: { CR/CA/VR ExRx Evaluation:49238}   Frequency:  { CR/CA/VR Session Frequency:11104} days/week   Mode: {Mode:06966}   Duration: *** total aerobic minutes   Intensity: RPE ***  Target HR:  { CR/CA/VR Target HR:44758}  MET Level: ***  Patient wears supplemental O2: { CR/CA/VR Supplemental O2:87682}     Modality Workload METs Duration (minutes)   1 Pre-Exercise      2 { CR/CA/VR Exercise Modality:19128} ***  *** *** :00   3 { CR/CA/VR Exercise Modality:25757} ***  *** *** :00   4 { CR/CA/VR Exercise Modality:50118} ***  *** *** :00   5 { CR/CA/VR Exercise Modality:69276} ***  *** *** :00   6 Post-Exercise        Resistance Training: {YES/NO:667022}  Home Exercise Prescription given: { CR/CA/VR Home Ex Rx:99938}    Exercise Plan    Goal Status: { CR/CA/VR Goal Status:03663}    Exercise Goals: { CR/CA/VR Exercise Goals:14748}    Exercise Interventions/Education:   { CR/CA/VR To be done in Cardiac Rehab:39979}    { AMB CR/CA/VR Exercise Initial Reassess Discharge:79454}    Other Core Components/Risk Factor Assessment:    Medication adherence  Current Medications:   Medication Documentation Review Audit       Reviewed by Shahnaz Cox RN (Registered Nurse) on 04/02/25 at 1030      Medication Order Taking? Sig Documenting Provider Last Dose Status   albuterol 90 mcg/actuation inhaler [id number]  Inhale. INHALE 1 TO 2 PUFFS  EVERY 4 TO 6 HOURS AS NEEDED. Historical Provider, MD  Active   ascorbic acid (Vitamin C) 1,000 mg tablet [id number]  Take 1 tablet (1,000 mg) by mouth once daily. Historical Provider, MD  Active   Breo Ellipta 200-25 mcg/dose inhaler [id number]  Inhale 1 puff once daily. Odell Sharma MD  Active   calcium citrate-vitamin D3 (Citracal+D) 315 mg-5 mcg (200 unit) tablet [id number]  Take 1 tablet by mouth once daily. Historical Provider, MD  Active   cetirizine (ZyrTEC) 10 mg tablet [id number]  TAKE ONE TABLET BY MOUTH once DAILY Yaron Ragsdale MD  Active   cranberry 400 mg capsule [id number]  Take 500 mg by mouth. Historical Provider, MD  Active   diclofenac sodium (Aspercreme Arthritis Pain) 1 % gel [id number]  APPLY TOPICALLY TO AFFECTED AREA 4 TIMES DAILY AS NEEDED FOR KNEE PAIN Odell Sharma MD  Active   fluticasone (Flonase) 50 mcg/actuation nasal spray [id number]  Administer 2 sprays into each nostril once daily. Yaron Ragsdale MD   25 2359   furosemide (Lasix) 40 mg tablet [id number]  Take by mouth. Historical Provider, MD  Active   guaiFENesin (Mucinex) 600 mg 12 hr tablet [id number]  Take 2 tablets (1,200 mg) by mouth once daily. Historical Provider, MD  Active   levothyroxine (Synthroid, Levoxyl) 50 mcg tablet [id number]  TAKE ONE TABLET BY MOUTH EVERY DAY Odell Sharma MD  Active   omeprazole (PriLOSEC) 40 mg DR capsule [id number]  Take 1 capsule (40 mg) by mouth once daily in the morning. Take before meals. Do not crush or chew. Odell Sharma MD  Active   oxybutynin XL (Ditropan-XL) 10 mg 24 hr tablet [id number]  Take 1 tablet (10 mg) by mouth once daily. Historical Provider, MD  Active                                 Medication compliance: {UH GEN YES NO WILDCARD WILDCARD:07358}   Uses pill box/organizer: {YES/NO:}   Carries medication list: {YES/NO:}    Blood Pressure Management  History of Hypertension: {YES/NO:}  Medication Changes: {YES/NO:}  Resting BP:  There were no  vitals taken for this visit.     Heart Failure Management  Hx of Heart Failure: {UH CR/NJ/VR HF Management:23519}    Smoking/Tobacco Assessment  Tobacco Use History[1]    Other Core Component Plan    Goal Status: {UH CR/NJ/VR Goal Status:96489}    Other Core Component Goals: {UH CR/NJ/VR Other Goals:61066}    Other Core Component Interventions/Education:   {UH CR/NJ/VR Other Goals:97207}    {UH AMB CR/NJ/VR Other Core Initial Reassess Discharge:96627}    Individual Patient Goals:    ***  ***    Goal Status: {UH CR/NJ/VR Goal Status:63655}    Staff Comments:  ***    Rehab Staff Signature: Juhi Andres, RRT             [1]   Social History  Tobacco Use   Smoking Status Never    Passive exposure: Never   Smokeless Tobacco Never

## 2025-05-12 ENCOUNTER — CLINICAL SUPPORT (OUTPATIENT)
Dept: CARDIAC REHAB | Facility: HOSPITAL | Age: 75
End: 2025-05-12
Payer: MEDICARE

## 2025-05-12 DIAGNOSIS — J84.9 ILD (INTERSTITIAL LUNG DISEASE) (MULTI): Primary | ICD-10-CM

## 2025-05-12 PROCEDURE — 94626 PHY/QHP OP PULM RHB W/MNTR: CPT | Performed by: PEDIATRICS

## 2025-05-14 ENCOUNTER — CLINICAL SUPPORT (OUTPATIENT)
Dept: CARDIAC REHAB | Facility: HOSPITAL | Age: 75
End: 2025-05-14
Payer: MEDICARE

## 2025-05-14 DIAGNOSIS — J84.9 ILD (INTERSTITIAL LUNG DISEASE) (MULTI): Primary | ICD-10-CM

## 2025-05-14 PROCEDURE — 94626 PHY/QHP OP PULM RHB W/MNTR: CPT | Performed by: PEDIATRICS

## 2025-05-19 ENCOUNTER — CLINICAL SUPPORT (OUTPATIENT)
Dept: CARDIAC REHAB | Facility: HOSPITAL | Age: 75
End: 2025-05-19
Payer: MEDICARE

## 2025-05-19 ENCOUNTER — OFFICE VISIT (OUTPATIENT)
Dept: PRIMARY CARE | Facility: CLINIC | Age: 75
End: 2025-05-19
Payer: MEDICARE

## 2025-05-19 VITALS
BODY MASS INDEX: 32.95 KG/M2 | OXYGEN SATURATION: 90 % | HEART RATE: 95 BPM | SYSTOLIC BLOOD PRESSURE: 118 MMHG | HEIGHT: 64 IN | DIASTOLIC BLOOD PRESSURE: 72 MMHG | RESPIRATION RATE: 18 BRPM | WEIGHT: 193 LBS

## 2025-05-19 DIAGNOSIS — J06.9 UPPER RESPIRATORY INFECTION WITH COUGH AND CONGESTION: Primary | ICD-10-CM

## 2025-05-19 DIAGNOSIS — J84.9 ILD (INTERSTITIAL LUNG DISEASE) (MULTI): Primary | ICD-10-CM

## 2025-05-19 PROCEDURE — 1160F RVW MEDS BY RX/DR IN RCRD: CPT

## 2025-05-19 PROCEDURE — 1036F TOBACCO NON-USER: CPT

## 2025-05-19 PROCEDURE — 94626 PHY/QHP OP PULM RHB W/MNTR: CPT | Performed by: PEDIATRICS

## 2025-05-19 PROCEDURE — 99213 OFFICE O/P EST LOW 20 MIN: CPT

## 2025-05-19 PROCEDURE — 1126F AMNT PAIN NOTED NONE PRSNT: CPT

## 2025-05-19 PROCEDURE — 1159F MED LIST DOCD IN RCRD: CPT

## 2025-05-19 PROCEDURE — 3008F BODY MASS INDEX DOCD: CPT

## 2025-05-19 RX ORDER — AMOXICILLIN AND CLAVULANATE POTASSIUM 875; 125 MG/1; MG/1
875 TABLET, FILM COATED ORAL 2 TIMES DAILY
Qty: 14 TABLET | Refills: 0 | Status: SHIPPED | OUTPATIENT
Start: 2025-05-19 | End: 2025-05-26

## 2025-05-19 ASSESSMENT — ENCOUNTER SYMPTOMS
FEVER: 0
VOMITING: 0
FREQUENCY: 0
CONSTIPATION: 0
SHORTNESS OF BREATH: 0
ABDOMINAL PAIN: 0
SORE THROAT: 1
ACTIVITY CHANGE: 0
TROUBLE SWALLOWING: 0
DYSURIA: 0
COUGH: 1
WHEEZING: 0
SINUS PAIN: 1
JOINT SWELLING: 0
DIFFICULTY URINATING: 0
NAUSEA: 0
APPETITE CHANGE: 1
DIARRHEA: 0
CHILLS: 0
LIGHT-HEADEDNESS: 0
DIZZINESS: 0

## 2025-05-19 ASSESSMENT — PAIN SCALES - GENERAL: PAINLEVEL_OUTOF10: 0-NO PAIN

## 2025-05-19 NOTE — ASSESSMENT & PLAN NOTE
- Symptoms for over a week  - Ambulatory in the office without dyspnea   - Start Augmentin BID x 7 days  - Continue with Flonase, Mucinex OTC  - Go to ER for any chest pain, shortness of breath, or other concerning new symptoms

## 2025-05-19 NOTE — PROGRESS NOTES
Patient ID:   Justine Mesa is a 74 y.o. female with PMH remarkable for interstitial lung disease, osteopenia, paroxysmal atrial fibrillation, hernia repair, hypothyroidism, ID Anemia who presents to the office today for Cough (Cough congestion, sore throat, headache. Started over a week ago).    Last Office Visit: 2/25/25 with Dr. Lopez    HPI:    Justine presents to the office today with her . She reports they have both been sick with upper respiratory symptoms for over a week. She developed symptoms a few days before her spouse. She reports a headache, sinus congestion, sore throat, dry hacking cough, and poor appetite. She denies fevers, chills, ear pain or drainage, and trouble swallowing. She is not having chest pain or shortness of breath with activity. She has not had any nausea, vomiting, or diarrhea. She has been using Flonase and Mucinex with no significant improvement. She denies any sick contacts aside from her .     Social History     Tobacco Use    Smoking status: Never     Passive exposure: Never    Smokeless tobacco: Never   Substance Use Topics    Alcohol use: Yes     Comment: occasional      Review of Systems   Constitutional:  Positive for appetite change. Negative for activity change, chills and fever.   HENT:  Positive for congestion, postnasal drip, sinus pain and sore throat. Negative for ear discharge, ear pain and trouble swallowing.    Respiratory:  Positive for cough. Negative for shortness of breath and wheezing.    Cardiovascular:  Negative for chest pain and leg swelling.   Gastrointestinal:  Negative for abdominal pain, constipation, diarrhea, nausea and vomiting.   Genitourinary:  Negative for difficulty urinating, dysuria and frequency.   Musculoskeletal:  Negative for joint swelling.   Skin:  Negative for rash.   Neurological:  Negative for dizziness and light-headedness.   All other systems reviewed and are negative.    Visit Vitals  /72 (BP Location: Left arm,  "Patient Position: Sitting)   Pulse 95   Resp 18   Ht 1.626 m (5' 4\")   Wt 87.5 kg (193 lb)   SpO2 90%   BMI 33.13 kg/m²   OB Status Postmenopausal   Smoking Status Never   BSA 1.99 m²     Allergies[1]   Physical Exam  Vitals and nursing note reviewed.   Constitutional:       General: She is not in acute distress.     Appearance: She is obese. She is not toxic-appearing.   HENT:      Head: Normocephalic and atraumatic.      Right Ear: Tympanic membrane normal. There is no impacted cerumen.      Left Ear: Tympanic membrane normal. There is no impacted cerumen.      Nose: Congestion present.      Mouth/Throat:      Mouth: Mucous membranes are moist.      Pharynx: Posterior oropharyngeal erythema present. No oropharyngeal exudate.   Eyes:      General: No scleral icterus.     Conjunctiva/sclera: Conjunctivae normal.   Cardiovascular:      Rate and Rhythm: Normal rate and regular rhythm.   Pulmonary:      Effort: Pulmonary effort is normal. No respiratory distress.      Breath sounds: Rales present. No wheezing.   Abdominal:      General: There is no distension.      Palpations: Abdomen is soft.      Tenderness: There is no abdominal tenderness.   Musculoskeletal:         General: No swelling.      Cervical back: Normal range of motion.   Skin:     General: Skin is warm and dry.   Neurological:      Mental Status: She is alert and oriented to person, place, and time.   Psychiatric:         Mood and Affect: Mood normal.         Behavior: Behavior normal.       Current Outpatient Medications   Medication Instructions    albuterol 90 mcg/actuation inhaler Inhale. INHALE 1 TO 2 PUFFS EVERY 4 TO 6 HOURS AS NEEDED.    amoxicillin-clavulanate (Augmentin) 875-125 mg tablet 875 mg, oral, 2 times daily    apixaban (Eliquis) 5 mg (74 tabs) tablet Take 2 tablets (10 mg) by mouth 2 times a day for 7 days, then take 1 tablet (5 mg) by mouth 2 times a day.    apixaban (Eliquis) 5 mg tablet Take 1 Tablet by mouth Twice a day    ascorbic " acid (Vitamin C) 1,000 mg tablet 1 tablet, Daily    Breo Ellipta 200-25 mcg/dose inhaler 1 puff, inhalation, Daily    calcium citrate-vitamin D3 (Citracal+D) 315 mg-5 mcg (200 unit) tablet 1 tablet, Daily    cetirizine (ZYRTEC) 10 mg, oral, Daily    cranberry fruit 500 mg    diclofenac sodium (Aspercreme Arthritis Pain) 1 % gel APPLY TOPICALLY TO AFFECTED AREA 4 TIMES DAILY AS NEEDED FOR KNEE PAIN    flecainide (TAMBOCOR) 50 mg, oral, 2 times daily    fluticasone (Flonase) 50 mcg/actuation nasal spray 2 sprays, Each Nostril, Daily    furosemide (Lasix) 40 mg tablet Take by mouth.    guaiFENesin (MUCINEX) 1,200 mg, Daily    levothyroxine (SYNTHROID, LEVOXYL) 50 mcg, oral, Daily    metoprolol succinate XL (TOPROL-XL) 25 mg, oral, Daily, Do not crush or chew.    omeprazole (PRILOSEC) 40 mg, oral, Daily before breakfast, Do not crush or chew.    oxyBUTYnin XL (DITROPAN-XL) 10 mg, Daily      Lab Results   Component Value Date    WBC 10.7 04/02/2025    HGB 16.4 (H) 04/02/2025    HCT 51.5 (H) 04/02/2025     04/02/2025    CHOL 243 (H) 07/15/2024    TRIG 193 (H) 07/15/2024    HDL 48.8 07/15/2024    ALT 10 04/02/2025    AST 17 04/02/2025     04/02/2025    K 3.5 04/02/2025     04/02/2025    CREATININE 0.87 04/02/2025    BUN 13 04/02/2025    CO2 35 (H) 04/02/2025    TSH 1.62 07/15/2024    INR 1.1 01/24/2023    HGBA1C 5.4 09/24/2020       Assessment/Plan   Problem List Items Addressed This Visit           ICD-10-CM       ENT    Upper respiratory infection with cough and congestion - Primary J06.9    - Symptoms for over a week  - Ambulatory in the office without dyspnea   - Start Augmentin BID x 7 days  - Continue with Flonase, Mucinex OTC  - Go to ER for any chest pain, shortness of breath, or other concerning new symptoms          Relevant Medications    amoxicillin-clavulanate (Augmentin) 875-125 mg tablet     ------Follow up with Dr. Lopez in June as scheduled or sooner if needed.          [1]    Allergies  Allergen Reactions    Sulfa (Sulfonamide Antibiotics) Unknown

## 2025-05-21 ENCOUNTER — APPOINTMENT (OUTPATIENT)
Dept: CARDIAC REHAB | Facility: HOSPITAL | Age: 75
End: 2025-05-21
Payer: MEDICARE

## 2025-05-28 ENCOUNTER — CLINICAL SUPPORT (OUTPATIENT)
Dept: CARDIAC REHAB | Facility: HOSPITAL | Age: 75
End: 2025-05-28
Payer: MEDICARE

## 2025-05-28 DIAGNOSIS — J84.9 INTERSTITIAL LUNG DISEASE (MULTI): Primary | ICD-10-CM

## 2025-05-28 PROCEDURE — 94626 PHY/QHP OP PULM RHB W/MNTR: CPT | Performed by: PEDIATRICS

## 2025-06-02 ENCOUNTER — CLINICAL SUPPORT (OUTPATIENT)
Dept: CARDIAC REHAB | Facility: HOSPITAL | Age: 75
End: 2025-06-02
Payer: MEDICARE

## 2025-06-02 DIAGNOSIS — J84.9 INTERSTITIAL LUNG DISEASE (MULTI): Primary | ICD-10-CM

## 2025-06-02 PROCEDURE — 94626 PHY/QHP OP PULM RHB W/MNTR: CPT | Performed by: PEDIATRICS

## 2025-06-04 ENCOUNTER — CLINICAL SUPPORT (OUTPATIENT)
Dept: CARDIAC REHAB | Facility: HOSPITAL | Age: 75
End: 2025-06-04
Payer: MEDICARE

## 2025-06-04 DIAGNOSIS — J84.9 INTERSTITIAL LUNG DISEASE (MULTI): Primary | ICD-10-CM

## 2025-06-04 PROCEDURE — 94626 PHY/QHP OP PULM RHB W/MNTR: CPT | Performed by: PEDIATRICS

## 2025-06-10 ENCOUNTER — APPOINTMENT (OUTPATIENT)
Dept: PODIATRY | Facility: CLINIC | Age: 75
End: 2025-06-10
Payer: MEDICARE

## 2025-06-10 DIAGNOSIS — R60.0 EDEMA OF LOWER EXTREMITY: ICD-10-CM

## 2025-06-10 DIAGNOSIS — M79.671 PAIN IN BOTH FEET: ICD-10-CM

## 2025-06-10 DIAGNOSIS — B35.1 ONYCHOMYCOSIS OF TOENAIL: ICD-10-CM

## 2025-06-10 DIAGNOSIS — M79.672 PAIN IN BOTH FEET: ICD-10-CM

## 2025-06-10 DIAGNOSIS — M79.676 PAIN AROUND TOENAIL: Primary | ICD-10-CM

## 2025-06-10 PROCEDURE — 11721 DEBRIDE NAIL 6 OR MORE: CPT | Performed by: PODIATRIST

## 2025-06-10 NOTE — PROGRESS NOTES
History of Present Illness:     Patient states they are here for help trimming nails  Denies trauma  Unable to safely trim on own  Has swelling in legs, lymphedema. Wear compression hose daily  No other pedal complaints    Past Medical History  Past Medical History:   Diagnosis Date    Acute bronchitis, unspecified     Acute bronchitis, unspecified organism    Acute frontal sinusitis, unspecified 01/06/2020    Acute frontal sinusitis    Acute frontal sinusitis, unspecified 03/07/2018    Acute frontal sinusitis    Acute maxillary sinusitis, unspecified 01/06/2020    Acute maxillary sinusitis    Acute maxillary sinusitis, unspecified 11/09/2017    Acute maxillary antritis    Acute tonsillitis due to other specified organisms     Acute tonsillitis due to other specified organisms    Acute tonsillitis due to other specified organisms 01/31/2017    Acute tonsillitis due to other specified organisms    Acute upper respiratory infection, unspecified 04/18/2017    Viral URI with cough    Acute upper respiratory infection, unspecified 06/07/2017    Acute URI    Chronic frontal sinusitis 03/07/2018    Frontal sinusitis    Diaphragmatic hernia without obstruction or gangrene 06/13/2022    Paraesophageal hiatal hernia    Hypothyroidism, unspecified 01/04/2022    Hypothyroidism    Pain in unspecified lower leg 07/16/2019    Calf pain    Personal history of other diseases of the digestive system 07/11/2022    History of gastroesophageal reflux (GERD)    Personal history of other diseases of the digestive system     History of gastroesophageal reflux (GERD)    Personal history of other diseases of the digestive system     History of hemorrhoids    Personal history of other diseases of the respiratory system 03/07/2018    History of sore throat    Personal history of other diseases of the respiratory system 11/09/2017    History of acute pharyngitis    Personal history of other diseases of the respiratory system 11/21/2017     History of acute bronchitis    Personal history of other diseases of the respiratory system 03/07/2018    History of acute pharyngitis    Personal history of other specified conditions     History of dizziness    Personal history of urinary calculi     Personal history of renal calculi       Medications and Allergies have been reviewed.    Review Of Systems:  GENERAL: No weight loss, malaise or fevers.  HEENT: Negative for frequent or significant headaches,   RESPIRATORY: Negative for cough, wheezing or shortness of breath.  CARDIOVASCULAR: Negative for chest pain, leg swelling or palpitations.    Examination of Both Lower Extremities:   Vascular exam: Dorsalis pedis and Posterior Tibial pulses palpable 2/4 bilateral. Capillary refill time less than 3 seconds b/l. No Hair growth noted to digits. Mild +1 non pitting edema noted. Skin temp warm to warm from proximal to distal b/l. No varicosities noted.     Neurologic exam: Vibratory, protective and proprioception intact b/l. No neurologic deficits noted.      Musculoskeletal exam: Muscle strength 5/5 for all major muscle groups tested in the lower extremity b/l.      Dermatologic exam: Nails 1-5 b/l discolored and elongated. No SOI noted Webspaces 1-4 b/l are clean, dry and intact. No primary or secondary skin lesions noted. No open lesions or wounds noted at this time .    1. Pain around toenail        2. Edema of lower extremity        3. Onychomycosis of toenail        4. Pain in both feet          Patient exam and eval  Nails debrided  Monitor swelling  Recommend compression socks - diligent in wearing  No other pedal concerns  Fu prn  Patient was in agreement to this plan. All questions answered.    PCP Ladan Becerril, last visit 5/19/2025    Dennise Hays DPM  [phone]  [facility] [address]  Fax: [phone]

## 2025-06-11 ENCOUNTER — CLINICAL SUPPORT (OUTPATIENT)
Dept: CARDIAC REHAB | Facility: HOSPITAL | Age: 75
End: 2025-06-11
Payer: MEDICARE

## 2025-06-11 DIAGNOSIS — J84.9 ILD (INTERSTITIAL LUNG DISEASE) (MULTI): Primary | ICD-10-CM

## 2025-06-11 PROCEDURE — 94626 PHY/QHP OP PULM RHB W/MNTR: CPT | Performed by: PEDIATRICS

## 2025-06-16 ENCOUNTER — CLINICAL SUPPORT (OUTPATIENT)
Dept: CARDIAC REHAB | Facility: HOSPITAL | Age: 75
End: 2025-06-16
Payer: MEDICARE

## 2025-06-16 DIAGNOSIS — J84.9 ILD (INTERSTITIAL LUNG DISEASE) (MULTI): Primary | ICD-10-CM

## 2025-06-16 PROCEDURE — 94626 PHY/QHP OP PULM RHB W/MNTR: CPT | Performed by: PEDIATRICS

## 2025-06-17 DIAGNOSIS — J30.9 ALLERGIC RHINITIS, UNSPECIFIED SEASONALITY, UNSPECIFIED TRIGGER: ICD-10-CM

## 2025-06-17 RX ORDER — FLUTICASONE PROPIONATE 50 MCG
2 SPRAY, SUSPENSION (ML) NASAL DAILY
Qty: 48 G | Refills: 0 | Status: SHIPPED | OUTPATIENT
Start: 2025-06-17

## 2025-06-18 ENCOUNTER — CLINICAL SUPPORT (OUTPATIENT)
Dept: CARDIAC REHAB | Facility: HOSPITAL | Age: 75
End: 2025-06-18
Payer: MEDICARE

## 2025-06-18 DIAGNOSIS — J84.9 ILD (INTERSTITIAL LUNG DISEASE) (MULTI): Primary | ICD-10-CM

## 2025-06-18 PROCEDURE — 94626 PHY/QHP OP PULM RHB W/MNTR: CPT | Performed by: PEDIATRICS

## 2025-06-18 RX ORDER — CETIRIZINE HYDROCHLORIDE 10 MG/1
10 TABLET ORAL DAILY
Qty: 90 TABLET | Refills: 0 | Status: SHIPPED | OUTPATIENT
Start: 2025-06-18

## 2025-06-23 ENCOUNTER — CLINICAL SUPPORT (OUTPATIENT)
Dept: CARDIAC REHAB | Facility: HOSPITAL | Age: 75
End: 2025-06-23
Payer: MEDICARE

## 2025-06-23 DIAGNOSIS — J84.9 ILD (INTERSTITIAL LUNG DISEASE) (MULTI): Primary | ICD-10-CM

## 2025-06-23 PROCEDURE — 94626 PHY/QHP OP PULM RHB W/MNTR: CPT | Performed by: PEDIATRICS

## 2025-06-25 ENCOUNTER — APPOINTMENT (OUTPATIENT)
Dept: PRIMARY CARE | Facility: CLINIC | Age: 75
End: 2025-06-25
Payer: MEDICARE

## 2025-06-25 ENCOUNTER — CLINICAL SUPPORT (OUTPATIENT)
Dept: CARDIAC REHAB | Facility: HOSPITAL | Age: 75
End: 2025-06-25
Payer: MEDICARE

## 2025-06-25 DIAGNOSIS — J84.9 INTERSTITIAL LUNG DISEASE (MULTI): Primary | ICD-10-CM

## 2025-06-25 PROCEDURE — 94626 PHY/QHP OP PULM RHB W/MNTR: CPT | Performed by: PEDIATRICS

## 2025-06-30 ENCOUNTER — CLINICAL SUPPORT (OUTPATIENT)
Dept: CARDIAC REHAB | Facility: HOSPITAL | Age: 75
End: 2025-06-30
Payer: MEDICARE

## 2025-06-30 DIAGNOSIS — J84.9 INTERSTITIAL LUNG DISEASE (MULTI): Primary | ICD-10-CM

## 2025-06-30 PROCEDURE — 94626 PHY/QHP OP PULM RHB W/MNTR: CPT | Performed by: PEDIATRICS

## 2025-07-02 ENCOUNTER — APPOINTMENT (OUTPATIENT)
Dept: CARDIAC REHAB | Facility: HOSPITAL | Age: 75
End: 2025-07-02
Payer: MEDICARE

## 2025-07-07 ENCOUNTER — CLINICAL SUPPORT (OUTPATIENT)
Dept: CARDIAC REHAB | Facility: HOSPITAL | Age: 75
End: 2025-07-07
Payer: MEDICARE

## 2025-07-07 DIAGNOSIS — J84.9 ILD (INTERSTITIAL LUNG DISEASE) (MULTI): Primary | ICD-10-CM

## 2025-07-07 PROCEDURE — 94626 PHY/QHP OP PULM RHB W/MNTR: CPT | Performed by: PEDIATRICS

## 2025-07-08 ENCOUNTER — TELEPHONE (OUTPATIENT)
Dept: PULMONOLOGY | Facility: HOSPITAL | Age: 75
End: 2025-07-08
Payer: MEDICARE

## 2025-07-08 DIAGNOSIS — J84.9 ILD (INTERSTITIAL LUNG DISEASE) (MULTI): ICD-10-CM

## 2025-07-08 NOTE — TELEPHONE ENCOUNTER
Returned pt's call regarding repeat CT scan prior to follow up visit in September. Pt informed that she will need repeat PFT's, 6 minute walk test, and HRCT. Pt verbalized understanding. Orders were placed. Pt was provided the phone numbers to call and schedule testing.

## 2025-07-21 ENCOUNTER — TELEPHONE (OUTPATIENT)
Dept: PULMONOLOGY | Facility: HOSPITAL | Age: 75
End: 2025-07-21
Payer: MEDICARE

## 2025-07-21 NOTE — TELEPHONE ENCOUNTER
LM with new appt details due to change in provider schedule. Provided office number in case date/time does not work for patient or . (Scheduled together)

## 2025-07-24 ENCOUNTER — TELEPHONE (OUTPATIENT)
Dept: PRIMARY CARE | Facility: CLINIC | Age: 75
End: 2025-07-24
Payer: MEDICARE

## 2025-08-05 PROCEDURE — RXMED WILLOW AMBULATORY MEDICATION CHARGE

## 2025-08-07 ENCOUNTER — PHARMACY VISIT (OUTPATIENT)
Dept: PHARMACY | Facility: CLINIC | Age: 75
End: 2025-08-07
Payer: COMMERCIAL

## 2025-08-25 DIAGNOSIS — E03.9 HYPOTHYROIDISM, UNSPECIFIED TYPE: ICD-10-CM

## 2025-08-25 RX ORDER — LEVOTHYROXINE SODIUM 50 UG/1
50 TABLET ORAL DAILY
Qty: 90 TABLET | Refills: 0 | Status: SHIPPED | OUTPATIENT
Start: 2025-08-25

## 2025-08-27 ENCOUNTER — HOSPITAL ENCOUNTER (OUTPATIENT)
Dept: RESPIRATORY THERAPY | Facility: HOSPITAL | Age: 75
Discharge: HOME | End: 2025-08-27
Payer: MEDICARE

## 2025-08-27 DIAGNOSIS — J84.9 ILD (INTERSTITIAL LUNG DISEASE) (MULTI): ICD-10-CM

## 2025-08-27 DIAGNOSIS — J84.9 ILD (INTERSTITIAL LUNG DISEASE) (MULTI): Primary | ICD-10-CM

## 2025-08-27 LAB
MGC ASCENT PFT - FEV1 - POST: 1.85
MGC ASCENT PFT - FEV1 - POST: 1.85
MGC ASCENT PFT - FEV1 - PRE: 1.8
MGC ASCENT PFT - FEV1 - PRE: 1.8
MGC ASCENT PFT - FEV1 - PREDICTED: 2.06
MGC ASCENT PFT - FEV1 - PREDICTED: 2.06
MGC ASCENT PFT - FVC - POST: 2.51
MGC ASCENT PFT - FVC - POST: 2.51
MGC ASCENT PFT - FVC - PRE: 2.6
MGC ASCENT PFT - FVC - PRE: 2.6
MGC ASCENT PFT - FVC - PREDICTED: 2.67
MGC ASCENT PFT - FVC - PREDICTED: 2.67

## 2025-08-27 PROCEDURE — 94640 AIRWAY INHALATION TREATMENT: CPT | Mod: XE

## 2025-08-27 PROCEDURE — 94729 DIFFUSING CAPACITY: CPT

## 2025-08-27 PROCEDURE — 2500000002 HC RX 250 W HCPCS SELF ADMINISTERED DRUGS (ALT 637 FOR MEDICARE OP, ALT 636 FOR OP/ED): Performed by: INTERNAL MEDICINE

## 2025-08-27 PROCEDURE — 94060 EVALUATION OF WHEEZING: CPT

## 2025-08-27 PROCEDURE — 94618 PULMONARY STRESS TESTING: CPT

## 2025-08-27 RX ORDER — ALBUTEROL SULFATE 0.83 MG/ML
3 SOLUTION RESPIRATORY (INHALATION) ONCE
Status: COMPLETED | OUTPATIENT
Start: 2025-08-27 | End: 2025-08-27

## 2025-08-27 RX ORDER — ALBUTEROL SULFATE 90 UG/1
4 INHALANT RESPIRATORY (INHALATION) ONCE
Status: COMPLETED | OUTPATIENT
Start: 2025-08-27 | End: 2025-08-27

## 2025-08-27 RX ADMIN — ALBUTEROL SULFATE 3 ML: 2.5 SOLUTION RESPIRATORY (INHALATION) at 08:46

## 2025-08-28 ENCOUNTER — OFFICE VISIT (OUTPATIENT)
Dept: PRIMARY CARE | Facility: CLINIC | Age: 75
End: 2025-08-28
Payer: MEDICARE

## 2025-08-28 VITALS
HEIGHT: 64 IN | DIASTOLIC BLOOD PRESSURE: 74 MMHG | WEIGHT: 198 LBS | BODY MASS INDEX: 33.8 KG/M2 | HEART RATE: 67 BPM | OXYGEN SATURATION: 95 % | SYSTOLIC BLOOD PRESSURE: 110 MMHG | RESPIRATION RATE: 18 BRPM

## 2025-08-28 DIAGNOSIS — R51.9 FRONTAL HEADACHE: ICD-10-CM

## 2025-08-28 DIAGNOSIS — I89.0 LYMPHEDEMA: Primary | ICD-10-CM

## 2025-08-28 PROCEDURE — 1159F MED LIST DOCD IN RCRD: CPT

## 2025-08-28 PROCEDURE — 99213 OFFICE O/P EST LOW 20 MIN: CPT

## 2025-08-28 PROCEDURE — 1126F AMNT PAIN NOTED NONE PRSNT: CPT

## 2025-08-28 PROCEDURE — 1160F RVW MEDS BY RX/DR IN RCRD: CPT

## 2025-08-28 PROCEDURE — 3008F BODY MASS INDEX DOCD: CPT

## 2025-08-28 PROCEDURE — 1036F TOBACCO NON-USER: CPT

## 2025-08-28 ASSESSMENT — ENCOUNTER SYMPTOMS
DYSURIA: 0
VOMITING: 0
FEVER: 0
DIFFICULTY URINATING: 0
CONSTIPATION: 0
ACTIVITY CHANGE: 0
SHORTNESS OF BREATH: 0
DIZZINESS: 0
FREQUENCY: 0
LIGHT-HEADEDNESS: 0
DIARRHEA: 0
HEADACHES: 1
APPETITE CHANGE: 0
COUGH: 0
ABDOMINAL PAIN: 0
JOINT SWELLING: 1
CHILLS: 0
NAUSEA: 0

## 2025-08-28 ASSESSMENT — PAIN SCALES - GENERAL: PAINLEVEL_OUTOF10: 0-NO PAIN

## 2025-08-29 ENCOUNTER — HOSPITAL ENCOUNTER (OUTPATIENT)
Dept: RADIOLOGY | Facility: HOSPITAL | Age: 75
Discharge: HOME | End: 2025-08-29
Payer: MEDICARE

## 2025-08-29 DIAGNOSIS — J84.9 ILD (INTERSTITIAL LUNG DISEASE) (MULTI): ICD-10-CM

## 2025-08-29 PROCEDURE — 71250 CT THORAX DX C-: CPT

## 2025-09-05 ENCOUNTER — APPOINTMENT (OUTPATIENT)
Dept: PULMONOLOGY | Facility: HOSPITAL | Age: 75
End: 2025-09-05
Payer: MEDICARE

## 2025-09-23 ENCOUNTER — APPOINTMENT (OUTPATIENT)
Dept: PODIATRY | Facility: CLINIC | Age: 75
End: 2025-09-23
Payer: COMMERCIAL